# Patient Record
Sex: MALE | Race: WHITE | NOT HISPANIC OR LATINO | Employment: FULL TIME | URBAN - METROPOLITAN AREA
[De-identification: names, ages, dates, MRNs, and addresses within clinical notes are randomized per-mention and may not be internally consistent; named-entity substitution may affect disease eponyms.]

---

## 2020-01-30 DIAGNOSIS — M25.562 PAIN IN BOTH KNEES, UNSPECIFIED CHRONICITY: Primary | ICD-10-CM

## 2020-01-30 DIAGNOSIS — M25.561 PAIN IN BOTH KNEES, UNSPECIFIED CHRONICITY: Primary | ICD-10-CM

## 2020-02-03 ENCOUNTER — OFFICE VISIT (OUTPATIENT)
Dept: ORTHOPEDICS | Facility: CLINIC | Age: 65
End: 2020-02-03
Payer: COMMERCIAL

## 2020-02-03 ENCOUNTER — HOSPITAL ENCOUNTER (OUTPATIENT)
Dept: RADIOLOGY | Facility: HOSPITAL | Age: 65
Discharge: HOME | End: 2020-02-03
Attending: ORTHOPAEDIC SURGERY
Payer: COMMERCIAL

## 2020-02-03 VITALS — WEIGHT: 225.19 LBS | BODY MASS INDEX: 28.9 KG/M2 | HEIGHT: 74 IN

## 2020-02-03 DIAGNOSIS — M25.561 PAIN IN BOTH KNEES, UNSPECIFIED CHRONICITY: ICD-10-CM

## 2020-02-03 DIAGNOSIS — M17.11 PRIMARY OSTEOARTHRITIS OF RIGHT KNEE: Primary | ICD-10-CM

## 2020-02-03 DIAGNOSIS — M17.12 PRIMARY OSTEOARTHRITIS OF LEFT KNEE: ICD-10-CM

## 2020-02-03 DIAGNOSIS — M25.562 PAIN IN BOTH KNEES, UNSPECIFIED CHRONICITY: ICD-10-CM

## 2020-02-03 PROCEDURE — 99999 PR PBB SHADOW E&M-EST. PATIENT-LVL III: ICD-10-PCS | Mod: PBBFAC,,, | Performed by: ORTHOPAEDIC SURGERY

## 2020-02-03 PROCEDURE — 73562 X-RAY EXAM OF KNEE 3: CPT | Mod: TC,50

## 2020-02-03 PROCEDURE — 3008F PR BODY MASS INDEX (BMI) DOCUMENTED: ICD-10-PCS | Mod: CPTII,S$GLB,, | Performed by: ORTHOPAEDIC SURGERY

## 2020-02-03 PROCEDURE — 3008F BODY MASS INDEX DOCD: CPT | Mod: CPTII,S$GLB,, | Performed by: ORTHOPAEDIC SURGERY

## 2020-02-03 PROCEDURE — 99203 PR OFFICE/OUTPT VISIT, NEW, LEVL III, 30-44 MIN: ICD-10-PCS | Mod: S$GLB,,, | Performed by: ORTHOPAEDIC SURGERY

## 2020-02-03 PROCEDURE — 99999 PR PBB SHADOW E&M-EST. PATIENT-LVL III: CPT | Mod: PBBFAC,,, | Performed by: ORTHOPAEDIC SURGERY

## 2020-02-03 PROCEDURE — 99203 OFFICE O/P NEW LOW 30 MIN: CPT | Mod: S$GLB,,, | Performed by: ORTHOPAEDIC SURGERY

## 2020-02-03 PROCEDURE — 73562 XR KNEE ORTHO BILAT: ICD-10-PCS | Mod: 26,50,, | Performed by: RADIOLOGY

## 2020-02-03 PROCEDURE — 73562 X-RAY EXAM OF KNEE 3: CPT | Mod: 26,50,, | Performed by: RADIOLOGY

## 2020-02-03 NOTE — PROGRESS NOTES
Subjective:      Patient ID: Hilario Thomas is a 64 y.o. male.    Chief Complaint: Pain of the Left Knee and Pain of the Right Knee    HPI    Hilario Thomas is a 64 year old male here with a 4 years history of bilateral knee pain.  The left was hurting more however now the right is hurting more than the left. 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 There was not a history of trauma.  The pain is moderate The pain is located in the lateral, global aspect of the knee. There is not radiation. There is catching or locking.   The pain is described as achy. The patient has not had prior surgery. It is aggravated by walking.  It is not alleviated by rest. There is not numbness or tingling of the lower extremity.  There is not back pain.  He  has tried medications and injections. They have helped for months initially however now they do not help.  He does have difficulty getting in or out of a car, getting dressed, or going up or down stairs.  The patient does not use an assistive device.     Past Medical History:   Diagnosis Date    Abnormal ECG 5/31/2016 5/31/2016: ECG: Inferior Q waves.    Family history of ischemic heart disease 5/31/2016    Mother: Age 55: Myocardial infarction.       Current Outpatient Medications on File Prior to Visit   Medication Sig Dispense Refill    meloxicam (MOBIC) 15 MG tablet Take 15 mg by mouth once daily.       No current facility-administered medications on file prior to visit.        Past Surgical History:   Procedure Laterality Date    KNEE SURGERY         Family History   Problem Relation Age of Onset    Heart attack Mother        Social History     Socioeconomic History    Marital status:      Spouse name: Not on file    Number of children: Not on file    Years of education: Not on file    Highest education level: Not on file   Occupational History    Not on file   Social Needs    Financial resource strain: Not on file     Food insecurity:     Worry: Not on file     Inability: Not on file    Transportation needs:     Medical: Not on file     Non-medical: Not on file   Tobacco Use    Smoking status: Former Smoker     Packs/day: 1.00     Years: 10.00     Pack years: 10.00     Start date: 1977     Last attempt to quit: 1987     Years since quittin.1    Smokeless tobacco: Never Used   Substance and Sexual Activity    Alcohol use: Yes     Comment: Socially    Drug use: Not on file    Sexual activity: Not on file   Lifestyle    Physical activity:     Days per week: Not on file     Minutes per session: Not on file    Stress: Not on file   Relationships    Social connections:     Talks on phone: Not on file     Gets together: Not on file     Attends Denominational service: Not on file     Active member of club or organization: Not on file     Attends meetings of clubs or organizations: Not on file     Relationship status: Not on file   Other Topics Concern    Not on file   Social History Narrative    Not on file         Review of Systems   Constitution: Negative for chills, fever and night sweats.   HENT: Negative for hearing loss.    Eyes: Negative for blurred vision and double vision.   Cardiovascular: Negative for chest pain, claudication and leg swelling.   Respiratory: Negative for shortness of breath.    Endocrine: Negative for polydipsia, polyphagia and polyuria.   Hematologic/Lymphatic: Negative for adenopathy and bleeding problem. Does not bruise/bleed easily.   Skin: Negative for poor wound healing.   Musculoskeletal: Positive for joint pain.   Gastrointestinal: Negative for diarrhea and heartburn.   Genitourinary: Negative for bladder incontinence.   Neurological: Negative for focal weakness, headaches, light-headedness, numbness, paresthesias and sensory change.   Psychiatric/Behavioral: The patient is not nervous/anxious.    Allergic/Immunologic: Negative for persistent infections.              Objective:       Body mass index is 28.91 kg/m².        General    Constitutional: He is oriented to person, place, and time. He appears well-developed.   HENT:   Head: Atraumatic.   Cardiovascular: Normal rate.    Pulmonary/Chest: Effort normal.   Abdominal: Soft.   Neurological: He is alert and oriented to person, place, and time. He has normal reflexes.   Psychiatric: He has a normal mood and affect.     General Musculoskeletal Exam   Gait: normal       Right Knee Exam     Inspection   Erythema: absent  Scars: absent  Swelling: absent  Effusion: absent  Deformity: present (varus)  Bruising: absent    Tenderness   The patient is tender to palpation of the lateral joint line and lateral retinaculum.    Range of Motion   Extension: 5   Flexion: 110     Tests   Ligament Examination Lachman: normal (-1 to 2mm)   MCL - Valgus: abnormal  LCL - Varus: abnormal  Patella   Passive Patellar Tilt: neutral  Patellar Grind: positive    Other   Popliteal (Baker's) Cyst: present  Sensation: normal    Left Knee Exam     Inspection   Erythema: absent  Scars: absent  Swelling: absent  Effusion: absent  Deformity: present (varus)  Bruising: absent    Tenderness   The patient tender to palpation of the medial joint line.    Crepitus   The patient has crepitus of the patella.    Range of Motion   Extension: 0   Flexion: 110     Tests   Stability Lachman: normal (-1 to 2mm)   MCL - Valgus: abnormal  LCL - Varus: abnormal  Patella   Passive Patellar Tilt: neutral  Patellar Grind: positive    Other   Popliteal (Baker's) Cyst: present  Sensation: normal    Muscle Strength   Right Lower Extremity   Hip Abduction: 5/5   Quadriceps:  5/5   Hamstrin/5   Left Lower Extremity   Hip Abduction: 5/5   Quadriceps:  5/5   Hamstrin/5     Reflexes     Left Side  Quadriceps:  2+  Achilles:  2+    Right Side   Quadriceps:  2+  Achilles:  2+    Vascular Exam     Right Pulses  Dorsalis Pedis:      2+  Posterior Tibial:      2+        Left Pulses  Dorsalis  Pedis:      2+  Posterior Tibial:      2+        Edema  Right Lower Leg: absent  Left Lower Leg: absent    Radiographs taken today and reviewed by me demonstrate severe arthritic change of the bilateral KNEE(s).There  is not bone destruction.  There is not a fracture. The medial compartment is most involved.  There is a varus deformity.  The changes are tricompartmental.              Assessment:       Encounter Diagnoses   Name Primary?    Primary osteoarthritis of right knee Yes    Primary osteoarthritis of left knee           Plan:       Hilario was seen today for pain and pain.    Diagnoses and all orders for this visit:    Primary osteoarthritis of right knee    Primary osteoarthritis of left knee      Treatment options were discussed. The surgical process of robotically assisted right knee replacement was discussed in detail with the patient including a detailed discussion of the procedure itself (including visual model, x-ray review, and literature review). The typical perioperative and post-operative course was discussed and perioperative risks were discussed to the patient's satisfaction.  Risks and complications discussed included but were not limited to the risks of anesthetic complications, infection, bleeding, wound healing complications, stiffness, aseptic loosening, instability, limb length inequality, neurologic dysfunction including numbness and weakness, additional surgery,  DVT, pulmonary embolism, perioperative medical risks (cardiac, pulmonary, renal, neurologic), and death and the patient elects to proceed.  The patient should get medically cleared and attend the joint seminar.  ASA for DVT prophylaxis  Same Day protocol

## 2020-02-05 DIAGNOSIS — M17.12 PRIMARY OSTEOARTHRITIS OF LEFT KNEE: Primary | ICD-10-CM

## 2020-02-07 ENCOUNTER — OFFICE VISIT (OUTPATIENT)
Dept: PRIMARY CARE CLINIC | Facility: CLINIC | Age: 65
End: 2020-02-07
Payer: COMMERCIAL

## 2020-02-07 VITALS
BODY MASS INDEX: 29.14 KG/M2 | RESPIRATION RATE: 18 BRPM | OXYGEN SATURATION: 100 % | SYSTOLIC BLOOD PRESSURE: 138 MMHG | HEART RATE: 97 BPM | HEIGHT: 74 IN | WEIGHT: 227.06 LBS | TEMPERATURE: 98 F | DIASTOLIC BLOOD PRESSURE: 64 MMHG

## 2020-02-07 DIAGNOSIS — Z01.818 PREOPERATIVE EXAMINATION: ICD-10-CM

## 2020-02-07 DIAGNOSIS — Z12.11 COLON CANCER SCREENING: ICD-10-CM

## 2020-02-07 DIAGNOSIS — N52.9 ERECTILE DYSFUNCTION, UNSPECIFIED ERECTILE DYSFUNCTION TYPE: ICD-10-CM

## 2020-02-07 DIAGNOSIS — Z12.5 PROSTATE CANCER SCREENING: ICD-10-CM

## 2020-02-07 DIAGNOSIS — M17.0 PRIMARY OSTEOARTHRITIS OF BOTH KNEES: Primary | ICD-10-CM

## 2020-02-07 DIAGNOSIS — Z13.6 ENCOUNTER FOR SCREENING FOR CARDIOVASCULAR DISORDERS: ICD-10-CM

## 2020-02-07 DIAGNOSIS — Z11.59 NEED FOR HEPATITIS C SCREENING TEST: ICD-10-CM

## 2020-02-07 DIAGNOSIS — Z23 NEED FOR VACCINATION: ICD-10-CM

## 2020-02-07 DIAGNOSIS — Z11.4 SCREENING FOR HIV (HUMAN IMMUNODEFICIENCY VIRUS): ICD-10-CM

## 2020-02-07 PROBLEM — M17.9 OSTEOARTHRITIS OF KNEE: Status: ACTIVE | Noted: 2020-02-07

## 2020-02-07 PROCEDURE — 99999 PR PBB SHADOW E&M-EST. PATIENT-LVL IV: ICD-10-PCS | Mod: PBBFAC,,, | Performed by: FAMILY MEDICINE

## 2020-02-07 PROCEDURE — 99999 PR PBB SHADOW E&M-EST. PATIENT-LVL IV: CPT | Mod: PBBFAC,,, | Performed by: FAMILY MEDICINE

## 2020-02-07 PROCEDURE — 3008F PR BODY MASS INDEX (BMI) DOCUMENTED: ICD-10-PCS | Mod: CPTII,S$GLB,, | Performed by: FAMILY MEDICINE

## 2020-02-07 PROCEDURE — 93005 ELECTROCARDIOGRAM TRACING: CPT | Mod: S$GLB,,, | Performed by: FAMILY MEDICINE

## 2020-02-07 PROCEDURE — 99203 PR OFFICE/OUTPT VISIT, NEW, LEVL III, 30-44 MIN: ICD-10-PCS | Mod: 25,S$GLB,, | Performed by: FAMILY MEDICINE

## 2020-02-07 PROCEDURE — 93005 EKG 12-LEAD: ICD-10-PCS | Mod: S$GLB,,, | Performed by: FAMILY MEDICINE

## 2020-02-07 PROCEDURE — 93010 EKG 12-LEAD: ICD-10-PCS | Mod: S$GLB,,, | Performed by: INTERNAL MEDICINE

## 2020-02-07 PROCEDURE — 99203 OFFICE O/P NEW LOW 30 MIN: CPT | Mod: 25,S$GLB,, | Performed by: FAMILY MEDICINE

## 2020-02-07 PROCEDURE — 3008F BODY MASS INDEX DOCD: CPT | Mod: CPTII,S$GLB,, | Performed by: FAMILY MEDICINE

## 2020-02-07 PROCEDURE — 93010 ELECTROCARDIOGRAM REPORT: CPT | Mod: S$GLB,,, | Performed by: INTERNAL MEDICINE

## 2020-02-07 NOTE — PROGRESS NOTES
"Subjective:       Patient ID: Hilario Thomas is a 64 y.o. male.    Chief Complaint: Establish Care    Here to establish care.  Has not had a general checkup in several years.  Stress test negative for ischemia several years ago.  Has been feeling well other than bilateral knee pain. Scheduled for total knee arthroplasty in late March, and will need the other knee done a few months later.  No other complaints at this time other than some issues related to erectile dysfunction.  No history of STIs.  No dysuria or discharge.    Review of Systems   Constitutional: Negative for chills, fatigue and fever.   HENT: Negative for congestion.    Eyes: Negative for visual disturbance.   Respiratory: Negative for cough and shortness of breath.    Cardiovascular: Negative for chest pain.   Gastrointestinal: Negative for abdominal pain, nausea and vomiting.   Genitourinary: Negative for difficulty urinating.   Musculoskeletal: Negative for arthralgias.   Skin: Negative for rash.   Allergic/Immunologic: Negative for immunocompromised state.   Neurological: Negative for dizziness.   Hematological: Does not bruise/bleed easily.   Psychiatric/Behavioral: Negative for sleep disturbance.       Objective:      Vitals:    02/07/20 1337   BP: 138/64   BP Location: Left arm   Patient Position: Sitting   BP Method: Large (Manual)   Pulse: 97   Resp: 18   Temp: 98.2 °F (36.8 °C)   TempSrc: Oral   SpO2: 100%   Weight: 103 kg (227 lb 1.2 oz)   Height: 6' 2" (1.88 m)     Physical Exam   Constitutional: He is oriented to person, place, and time. He appears well-developed and well-nourished.   HENT:   Head: Normocephalic and atraumatic.   Mouth/Throat: Oropharynx is clear and moist.   Eyes: Pupils are equal, round, and reactive to light. EOM are normal.   Neck: Neck supple. No JVD present. Carotid bruit is not present.   Cardiovascular: Normal rate, regular rhythm and normal heart sounds.   Pulses:       Radial pulses are 2+ on the right side, " and 2+ on the left side.   Pulmonary/Chest: Effort normal and breath sounds normal.   Abdominal: Soft. Bowel sounds are normal. There is no tenderness.   Musculoskeletal: He exhibits no edema.   Neurological: He is alert and oriented to person, place, and time.   Skin: Skin is warm and dry.   Psychiatric: He has a normal mood and affect. His behavior is normal.   Nursing note and vitals reviewed.      Lab Results   Component Value Date    WBC 6.67 02/15/2008    HGB 15.3 02/15/2008    HCT 46.0 02/15/2008     02/15/2008    CHOL 167 02/15/2008    TRIG 50 02/15/2008    HDL 47 02/15/2008     02/15/2008    K 5.1 02/15/2008     02/15/2008    CREATININE 0.9 02/15/2008    BUN 16 02/15/2008    CO2 24 02/15/2008    PSA 0.4 02/15/2008      Assessment:       1. Primary osteoarthritis of both knees    2. Colon cancer screening    3. Encounter for screening for cardiovascular disorders    4. Prostate cancer screening    5. Screening for HIV (human immunodeficiency virus)    6. Need for hepatitis C screening test    7. Preoperative examination    8. Need for vaccination    9. Erectile dysfunction, unspecified erectile dysfunction type        Plan:       Primary osteoarthritis of both knees    Colon cancer screening  -     Ambulatory referral/consult to Colorectal Surgery; Future; Expected date: 02/14/2020    Encounter for screening for cardiovascular disorders  -     CBC auto differential; Future; Expected date: 02/07/2020  -     Comprehensive metabolic panel; Future; Expected date: 02/07/2020  -     Lipid panel; Future; Expected date: 02/07/2020    Prostate cancer screening  -     PSA, Screening; Future; Expected date: 02/07/2020    Screening for HIV (human immunodeficiency virus)  -     HIV 1/2 Ag/Ab (4th Gen); Future; Expected date: 02/07/2020    Need for hepatitis C screening test  -     Hepatitis C antibody; Future; Expected date: 02/07/2020    Preoperative examination  -     EKG 12-lead  -     X-Ray Chest  PA And Moises; Future; Expected date: 02/07/2020    Need for vaccination  -     varicella-zoster gE-AS01B, PF, (SHINGRIX, PF,) 50 mcg/0.5 mL injection; Inject 0.5 mLs into the muscle once. for 1 dose  Dispense: 0.5 mL; Refill: 0    Erectile dysfunction, unspecified erectile dysfunction type  -     TESTOSTERONE PANEL; Future; Expected date: 02/07/2020      Medication List with Changes/Refills   New Medications    VARICELLA-ZOSTER GE-AS01B, PF, (SHINGRIX, PF,) 50 MCG/0.5 ML INJECTION    Inject 0.5 mLs into the muscle once. for 1 dose   Discontinued Medications    MELOXICAM (MOBIC) 15 MG TABLET    Take 15 mg by mouth once daily.

## 2020-02-10 ENCOUNTER — TELEPHONE (OUTPATIENT)
Dept: CARDIOLOGY | Facility: CLINIC | Age: 65
End: 2020-02-10

## 2020-02-10 DIAGNOSIS — Z12.11 SCREENING FOR COLON CANCER: Primary | ICD-10-CM

## 2020-02-10 NOTE — TELEPHONE ENCOUNTER
Called patient in reference to a referral to Colorectal Surgery for colon cancer screening. Patient verbally consented to a Colonoscopy and requested to be scheduled for a Colonoscopy on 03/19/2020. Patient was advised a designated  is required on the day of the Colonoscopy to drive the patient home and the  must be at least. 18 years old.Colonoscopy Prep instructions were thoroughly explained and discussed with the patient. Patient acknowledges understanding Prep instructions. Patient was advised the Colonoscopy Prep instructions discussed and explained on the phone , are being mailed out to the patient's address on file. Patient's address on file was verified with the patient for accuracy of mailing. Patient's medications on file was reviewed with the patient for accuracy of information. Patient denies taking  any other medications other than those listed and verified on medication profile.Patient was explained the Colonoscopy will be performed here at [facility]. Patient was further explained the Pre-Op will call one day prior to the procedure to discuss Pre-Op instructions and what time to report for the Colonoscopy. The patient was given the opportunity to ask any questions about the Colonoscopy. No further issues were discussed.

## 2020-02-11 ENCOUNTER — TELEPHONE (OUTPATIENT)
Dept: PRIMARY CARE CLINIC | Facility: CLINIC | Age: 65
End: 2020-02-11

## 2020-02-11 NOTE — TELEPHONE ENCOUNTER
----- Message from Elizabeth Rivera sent at 2/11/2020  1:16 PM CST -----  Contact: Patient  Type: Needs Medical Advice    Who Called:  Hilario patient  Symptoms (please be specific):  N/A  How long has patient had these symptoms:  N/A  Pharmacy name and phone #:  N/A  Best Call Back Number: [phone]  Additional Information: Calling to talk to you again about the chest xray results. Please call him. Thanks.

## 2020-02-11 NOTE — TELEPHONE ENCOUNTER
Patient notified degenerative changes noted to thoracic spine, no acute abnormalities. He verbalized understanding

## 2020-03-02 DIAGNOSIS — M79.605 PAIN OF LEFT LOWER EXTREMITY: Primary | ICD-10-CM

## 2020-03-04 ENCOUNTER — TELEPHONE (OUTPATIENT)
Dept: SURGERY | Facility: CLINIC | Age: 65
End: 2020-03-04

## 2020-03-04 NOTE — TELEPHONE ENCOUNTER
Called the patient at all available numbers reference to rescheduling the Colonoscopy that was canceled at the providers request , left voice message.

## 2020-03-06 ENCOUNTER — TELEPHONE (OUTPATIENT)
Dept: PREADMISSION TESTING | Facility: HOSPITAL | Age: 65
End: 2020-03-06

## 2020-03-06 DIAGNOSIS — M17.12 PRIMARY OSTEOARTHRITIS OF LEFT KNEE: Primary | ICD-10-CM

## 2020-03-06 NOTE — TELEPHONE ENCOUNTER
----- Message from Lisset Cerda LPN sent at 3/3/2020  3:31 PM CST -----  Surgery 3/31    Patient need Lab,POC and OPOC appt.

## 2020-03-09 ENCOUNTER — TELEPHONE (OUTPATIENT)
Dept: ORTHOPEDICS | Facility: CLINIC | Age: 65
End: 2020-03-09

## 2020-03-09 ENCOUNTER — DOCUMENTATION ONLY (OUTPATIENT)
Dept: ORTHOPEDICS | Facility: CLINIC | Age: 65
End: 2020-03-09

## 2020-03-09 NOTE — TELEPHONE ENCOUNTER
Spoke to pt, informed that his insurance company has denied his geovanni CT and geovanni robotically assisted portion of the surgery. The pt may call his insurance company to determine his out of pocket cost. He may have a regular TKA per Dr. Centeno or pay the OOP cost for the GEOVANNI.  Unable to answer pt's very specific questions regarding the difference between a TKA and a GEOVANNI TKA. Informed pt that Mariel RASMUSSEN will call him today to help answer questions. Pt states he will speak with his wife as well. Understanding verbalized.

## 2020-03-09 NOTE — TELEPHONE ENCOUNTER
Spoke to patient. We discussed TKA with and without robotic assistance. Patient is satisfied.     ----- Message from Hoda Borrero RN sent at 3/9/2020 11:28 AM CDT -----  Regarding: Geovanni surgery VS no GEOVANNI  Mariel,    I spoke to this pt to notify regarding the Geovanni ct not being approved and changing the sx to no geovanni. The pt had very specific questions about the difference of between the surgeries. Can you call him and explain it help him decide. I was unable to explain enough, sorry. He will not be available for a call from 12-2:30 pm.    Thank you so much,    Hoda

## 2020-03-13 DIAGNOSIS — M17.12 PRIMARY OSTEOARTHRITIS OF LEFT KNEE: Primary | ICD-10-CM

## 2020-03-19 ENCOUNTER — TELEPHONE (OUTPATIENT)
Dept: ORTHOPEDICS | Facility: CLINIC | Age: 65
End: 2020-03-19

## 2020-03-19 NOTE — TELEPHONE ENCOUNTER
Spoke to pt, informed that his surgery is postponed as we are not doing any elective surgeries due to COVID concerns. We will call when we are able to start rescheduling. Informed pt that Dr. Centeno was able to get the FILOMENA part of his surgery approved. He will have a CT done once we reschedule the surgery. Pt pleased and verbalized understanding.

## 2020-03-23 RX ORDER — MELOXICAM 15 MG/1
15 TABLET ORAL DAILY
Qty: 30 TABLET | Refills: 1 | Status: SHIPPED | OUTPATIENT
Start: 2020-03-23 | End: 2020-04-22

## 2020-04-09 ENCOUNTER — TELEPHONE (OUTPATIENT)
Dept: ORTHOPEDICS | Facility: CLINIC | Age: 65
End: 2020-04-09

## 2020-04-13 ENCOUNTER — TELEPHONE (OUTPATIENT)
Dept: ORTHOPEDICS | Facility: CLINIC | Age: 65
End: 2020-04-13

## 2020-04-13 NOTE — TELEPHONE ENCOUNTER
Spoke to pt, informed him that Dr. Centeno asked that we reach out to check on his pts and inquire about possibly being able to schedule surgeries in May. The situation is fluid and changing often. Pt states he is doing well and would like a surgery date in May if that is possible. Informed pt once we know more, we will reach out. Pt pleased and verbalized understanding.

## 2020-04-14 ENCOUNTER — TELEPHONE (OUTPATIENT)
Dept: ORTHOPEDICS | Facility: CLINIC | Age: 65
End: 2020-04-14

## 2020-04-14 NOTE — TELEPHONE ENCOUNTER
Spoke to pt ,informed that his surgery can be rescheduled for 5/5 but it is a fluid situation and may change. Pt accepted date of 5/5, scheduled appts, informed that he will be scheduled for a COVID test at the [facility] thru location on 5/4 and will call to provide details soon. Pt pleased and verbalized understanding.

## 2020-04-21 ENCOUNTER — TELEPHONE (OUTPATIENT)
Dept: ORTHOPEDICS | Facility: CLINIC | Age: 65
End: 2020-04-21

## 2020-04-21 NOTE — ANESTHESIA PAT ROS NOTE
04/21/2020  Hilario Thomas is a 65 y.o., male.      Pre-op Assessment         Review of Systems  Anesthesia Hx:  No problems with previous Anesthesia  Denies Family Hx of Anesthesia complications.   Denies Personal Hx of Anesthesia complications.   Social:  Former Smoker, Social Alcohol Use    Cardiovascular:    Denies Angina.  Functional Capacity good / => 4 METS    Pulmonary:   Denies Shortness of breath.  Denies Recent URI.  Possible Obstructive Sleep Apnea , (STOP/BANG) Symptoms S - Snoring (loud), A - Age > 50 and G - Gender (Male > Female)        Musculoskeletal:  Musculoskeletal General/Symptoms: joint pain. Functional capacity is ambulatory without assistance.  Joint Disease:  Arthritis, Osteoarthritis    Neurological:  Pain , onset is chronic , location of knee , alleviating factors are mobic prn. Osteoarthritis    Psych:  Psychiatric Normal           Physical Exam  General:  Well nourished    Airway/Jaw/Neck:  Jaw/Neck Findings: (per pt) Neck ROM: Extension Decreased, Mild      Dental:  Dental Findings: In tact        Mental Status:  Mental Status Findings:  Cooperative, Alert and Oriented       4/30/20 POC visit completed via phone interview. Covid scheduled for 5/4. Dr Parrish clearance obtained:  Preoperative  risk assessment     Cardiac     Revised cardiac risk index ( RCRI ) predictors- 0---.Functional capacity  Is more than 4 Mets. He will be undergoing a Orthopedic procedure that carries a intermediate risk      Risk of a major Cardiac event ( Defined as death, myocardial infarction, or cardiac arrest at 30 days after noncardiac surgery), based on RCRI score      -3.9%            No further cardiac work up is indicated prior to proceeding with the surgery      American Society of Anesthesiologists Physical status classification ( ASA ) class- - 2         Anesthesia Assessment:  Preoperative EQUATION    Planned Procedure:  ARTHROPLASTY, KNEE TOTAL  Requested Anesthesia Type: REGIONAL  Surgeon: Oli Centeno MD  Service: Orthopedics  Known or anticipated Date of Surgery: 5/5/2020    Surgeon notes: reviewed    Previous anesthesia records:Not available    Last PCP note: within [facility] , Dr Truxillo 2/7/20    Other important co-morbidities: none; former smoker      Tests already available:  CBC, CMP, EKG 2/7/20; outside test in media: EXERCISE STRESS ECHO 2016                Plan:    Testing:  PT/INR   Pre-anesthesia  visit       Visit focus: possible regional anesthesia and/or nerve block      Consultation:IM Perioperative Hospitalist       Navigation: Tests to be scheduled.              Consults to be scheduled.             Results will be tracked by Preop Clinic.

## 2020-04-21 NOTE — TELEPHONE ENCOUNTER
Spoke to pt, rescheduled pre-op appt and CT to 4/29. Mailed appt slips. Informed pt that the Iovera will not be approved by his insurance. Pt states that is fine. Pt pleased and verbalized understanding.

## 2020-04-23 ENCOUNTER — TELEPHONE (OUTPATIENT)
Dept: ORTHOPEDICS | Facility: CLINIC | Age: 65
End: 2020-04-23

## 2020-04-23 DIAGNOSIS — Z01.818 PRE-OP TESTING: Primary | ICD-10-CM

## 2020-04-23 NOTE — TELEPHONE ENCOUNTER
Lm for pt to call back and schedule his COVID-19 test to be done first thing in the morning on 5/4.

## 2020-04-29 ENCOUNTER — PATIENT OUTREACH (OUTPATIENT)
Dept: ADMINISTRATIVE | Facility: OTHER | Age: 65
End: 2020-04-29

## 2020-04-29 ENCOUNTER — INITIAL CONSULT (OUTPATIENT)
Dept: INTERNAL MEDICINE | Facility: CLINIC | Age: 65
End: 2020-04-29
Payer: COMMERCIAL

## 2020-04-29 ENCOUNTER — OFFICE VISIT (OUTPATIENT)
Dept: INTERNAL MEDICINE | Facility: CLINIC | Age: 65
End: 2020-04-29
Payer: COMMERCIAL

## 2020-04-29 ENCOUNTER — OFFICE VISIT (OUTPATIENT)
Dept: ORTHOPEDICS | Facility: CLINIC | Age: 65
End: 2020-04-29
Payer: COMMERCIAL

## 2020-04-29 ENCOUNTER — HOSPITAL ENCOUNTER (OUTPATIENT)
Dept: RADIOLOGY | Facility: HOSPITAL | Age: 65
Discharge: HOME | End: 2020-04-29
Attending: PHYSICIAN ASSISTANT
Payer: COMMERCIAL

## 2020-04-29 ENCOUNTER — LAB VISIT (OUTPATIENT)
Dept: LAB | Facility: HOSPITAL | Age: 65
End: 2020-04-29
Attending: EMERGENCY MEDICINE
Payer: COMMERCIAL

## 2020-04-29 VITALS
DIASTOLIC BLOOD PRESSURE: 73 MMHG | HEIGHT: 74 IN | BODY MASS INDEX: 29 KG/M2 | TEMPERATURE: 98 F | HEART RATE: 50 BPM | WEIGHT: 226 LBS | SYSTOLIC BLOOD PRESSURE: 157 MMHG

## 2020-04-29 DIAGNOSIS — R60.9 EDEMA, UNSPECIFIED TYPE: ICD-10-CM

## 2020-04-29 DIAGNOSIS — R94.31 ABNORMAL ECG: ICD-10-CM

## 2020-04-29 DIAGNOSIS — M17.0 PRIMARY OSTEOARTHRITIS OF BOTH KNEES: ICD-10-CM

## 2020-04-29 DIAGNOSIS — Z82.49 FAMILY HISTORY OF ISCHEMIC HEART DISEASE: ICD-10-CM

## 2020-04-29 DIAGNOSIS — Z96.652 STATUS POST TOTAL LEFT KNEE REPLACEMENT: ICD-10-CM

## 2020-04-29 DIAGNOSIS — M17.12 PRIMARY OSTEOARTHRITIS OF LEFT KNEE: ICD-10-CM

## 2020-04-29 DIAGNOSIS — I49.9 CARDIAC ARRHYTHMIA, UNSPECIFIED CARDIAC ARRHYTHMIA TYPE: ICD-10-CM

## 2020-04-29 DIAGNOSIS — N52.9 ERECTILE DYSFUNCTION, UNSPECIFIED ERECTILE DYSFUNCTION TYPE: ICD-10-CM

## 2020-04-29 DIAGNOSIS — Z01.818 PREOP EXAMINATION: Primary | ICD-10-CM

## 2020-04-29 DIAGNOSIS — M79.605 PAIN OF LEFT LOWER EXTREMITY: ICD-10-CM

## 2020-04-29 DIAGNOSIS — M17.12 PRIMARY OSTEOARTHRITIS OF LEFT KNEE: Primary | ICD-10-CM

## 2020-04-29 DIAGNOSIS — Z83.3 FAMILY HISTORY OF DIABETES MELLITUS: ICD-10-CM

## 2020-04-29 LAB
INR PPP: 0.9 (ref 0.8–1.2)
PROTHROMBIN TIME: 9.9 SEC (ref 9–12.5)

## 2020-04-29 PROCEDURE — 99499 NO LOS: ICD-10-PCS | Mod: S$GLB,,, | Performed by: HOSPITALIST

## 2020-04-29 PROCEDURE — 73700 CT LOWER EXTREMITY W/O DYE: CPT | Mod: TC,LT

## 2020-04-29 PROCEDURE — 73560 X-RAY EXAM OF KNEE 1 OR 2: CPT | Mod: TC,LT

## 2020-04-29 PROCEDURE — 85610 PROTHROMBIN TIME: CPT

## 2020-04-29 PROCEDURE — 99214 OFFICE O/P EST MOD 30 MIN: CPT | Mod: 95,,, | Performed by: HOSPITALIST

## 2020-04-29 PROCEDURE — 99499 UNLISTED E&M SERVICE: CPT | Mod: S$GLB,,, | Performed by: PHYSICIAN ASSISTANT

## 2020-04-29 PROCEDURE — 99499 NO LOS: ICD-10-PCS | Mod: S$GLB,,, | Performed by: PHYSICIAN ASSISTANT

## 2020-04-29 PROCEDURE — 73560 XR KNEE 1 OR 2 VIEW LEFT: ICD-10-PCS | Mod: 26,LT,, | Performed by: INTERNAL MEDICINE

## 2020-04-29 PROCEDURE — 99499 UNLISTED E&M SERVICE: CPT | Mod: S$GLB,,, | Performed by: HOSPITALIST

## 2020-04-29 PROCEDURE — 73560 X-RAY EXAM OF KNEE 1 OR 2: CPT | Mod: 26,LT,, | Performed by: INTERNAL MEDICINE

## 2020-04-29 PROCEDURE — 73700 CT KNEE WITHOUT CONTRAST LEFT: ICD-10-PCS | Mod: 26,LT,, | Performed by: RADIOLOGY

## 2020-04-29 PROCEDURE — 99999 PR PBB SHADOW E&M-EST. PATIENT-LVL IV: CPT | Mod: PBBFAC,,, | Performed by: PHYSICIAN ASSISTANT

## 2020-04-29 PROCEDURE — 99999 PR PBB SHADOW E&M-EST. PATIENT-LVL IV: ICD-10-PCS | Mod: PBBFAC,,, | Performed by: PHYSICIAN ASSISTANT

## 2020-04-29 PROCEDURE — 99214 PR OFFICE/OUTPT VISIT, EST, LEVL IV, 30-39 MIN: ICD-10-PCS | Mod: 95,,, | Performed by: HOSPITALIST

## 2020-04-29 PROCEDURE — 36415 COLL VENOUS BLD VENIPUNCTURE: CPT

## 2020-04-29 PROCEDURE — 73700 CT LOWER EXTREMITY W/O DYE: CPT | Mod: 26,LT,, | Performed by: RADIOLOGY

## 2020-04-29 RX ORDER — ASPIRIN 81 MG/1
81 TABLET ORAL 2 TIMES DAILY
Qty: 60 TABLET | Refills: 0 | Status: SHIPPED | OUTPATIENT
Start: 2020-04-29 | End: 2020-07-21 | Stop reason: ALTCHOICE

## 2020-04-29 RX ORDER — LIDOCAINE HYDROCHLORIDE 10 MG/ML
1 INJECTION, SOLUTION EPIDURAL; INFILTRATION; INTRACAUDAL; PERINEURAL
Status: CANCELLED | OUTPATIENT
Start: 2020-04-29

## 2020-04-29 RX ORDER — ACETAMINOPHEN 500 MG
1000 TABLET ORAL
Status: CANCELLED | OUTPATIENT
Start: 2020-04-29

## 2020-04-29 RX ORDER — CELECOXIB 100 MG/1
400 CAPSULE ORAL
Status: CANCELLED | OUTPATIENT
Start: 2020-04-29

## 2020-04-29 RX ORDER — SODIUM CHLORIDE 0.9 % (FLUSH) 0.9 %
10 SYRINGE (ML) INJECTION
Status: CANCELLED | OUTPATIENT
Start: 2020-04-29

## 2020-04-29 RX ORDER — ONDANSETRON 2 MG/ML
4 INJECTION INTRAMUSCULAR; INTRAVENOUS EVERY 8 HOURS PRN
Status: CANCELLED | OUTPATIENT
Start: 2020-04-29

## 2020-04-29 RX ORDER — FAMOTIDINE 20 MG/1
20 TABLET, FILM COATED ORAL 2 TIMES DAILY
Status: CANCELLED | OUTPATIENT
Start: 2020-04-29

## 2020-04-29 RX ORDER — OXYCODONE HYDROCHLORIDE 5 MG/1
5 TABLET ORAL
Status: CANCELLED | OUTPATIENT
Start: 2020-04-29

## 2020-04-29 RX ORDER — ACETAMINOPHEN 500 MG
1000 TABLET ORAL EVERY 6 HOURS
Status: CANCELLED | OUTPATIENT
Start: 2020-04-29 | End: 2020-05-01

## 2020-04-29 RX ORDER — DOCUSATE SODIUM 100 MG/1
100 CAPSULE, LIQUID FILLED ORAL 2 TIMES DAILY PRN
Qty: 60 CAPSULE | Refills: 0 | Status: SHIPPED | OUTPATIENT
Start: 2020-04-29 | End: 2020-05-18

## 2020-04-29 RX ORDER — OXYCODONE HYDROCHLORIDE 5 MG/1
TABLET ORAL
Qty: 50 TABLET | Refills: 0 | Status: SHIPPED | OUTPATIENT
Start: 2020-04-29 | End: 2020-05-18

## 2020-04-29 RX ORDER — POLYETHYLENE GLYCOL 3350 17 G/17G
17 POWDER, FOR SOLUTION ORAL DAILY
Status: CANCELLED | OUTPATIENT
Start: 2020-04-29

## 2020-04-29 RX ORDER — ACETAMINOPHEN 500 MG
500 TABLET ORAL DAILY PRN
Status: ON HOLD | COMMUNITY
End: 2020-05-05 | Stop reason: HOSPADM

## 2020-04-29 RX ORDER — BISACODYL 10 MG
10 SUPPOSITORY, RECTAL RECTAL EVERY 12 HOURS PRN
Status: CANCELLED | OUTPATIENT
Start: 2020-04-29

## 2020-04-29 RX ORDER — PREGABALIN 25 MG/1
75 CAPSULE ORAL NIGHTLY
Status: CANCELLED | OUTPATIENT
Start: 2020-04-29

## 2020-04-29 RX ORDER — OXYCODONE HYDROCHLORIDE 5 MG/1
10 TABLET ORAL
Status: CANCELLED | OUTPATIENT
Start: 2020-04-29

## 2020-04-29 RX ORDER — DEXTROMETHORPHAN HYDROBROMIDE, GUAIFENESIN 5; 100 MG/5ML; MG/5ML
650 LIQUID ORAL EVERY 8 HOURS PRN
Qty: 120 TABLET | Refills: 0 | Status: SHIPPED | OUTPATIENT
Start: 2020-04-29 | End: 2020-07-31 | Stop reason: SDUPTHER

## 2020-04-29 RX ORDER — AMOXICILLIN 250 MG
1 CAPSULE ORAL 2 TIMES DAILY
Status: CANCELLED | OUTPATIENT
Start: 2020-04-29

## 2020-04-29 RX ORDER — PREGABALIN 25 MG/1
150 CAPSULE ORAL
Status: CANCELLED | OUTPATIENT
Start: 2020-04-29

## 2020-04-29 RX ORDER — IBUPROFEN 200 MG
200 TABLET ORAL NIGHTLY PRN
Status: ON HOLD | COMMUNITY
End: 2020-05-05 | Stop reason: HOSPADM

## 2020-04-29 RX ORDER — NALOXONE HCL 0.4 MG/ML
0.02 VIAL (ML) INJECTION
Status: CANCELLED | OUTPATIENT
Start: 2020-04-29

## 2020-04-29 RX ORDER — SODIUM CHLORIDE 9 MG/ML
INJECTION, SOLUTION INTRAVENOUS
Status: CANCELLED | OUTPATIENT
Start: 2020-04-29

## 2020-04-29 RX ORDER — SODIUM CHLORIDE 9 MG/ML
INJECTION, SOLUTION INTRAVENOUS CONTINUOUS
Status: CANCELLED | OUTPATIENT
Start: 2020-04-29 | End: 2020-04-30

## 2020-04-29 RX ORDER — FENTANYL CITRATE 50 UG/ML
25 INJECTION, SOLUTION INTRAMUSCULAR; INTRAVENOUS EVERY 5 MIN PRN
Status: CANCELLED | OUTPATIENT
Start: 2020-04-29

## 2020-04-29 RX ORDER — MIDAZOLAM HYDROCHLORIDE 1 MG/ML
1 INJECTION INTRAMUSCULAR; INTRAVENOUS EVERY 5 MIN PRN
Status: CANCELLED | OUTPATIENT
Start: 2020-04-29

## 2020-04-29 RX ORDER — ASPIRIN 81 MG/1
81 TABLET ORAL 2 TIMES DAILY
Status: CANCELLED | OUTPATIENT
Start: 2020-04-29

## 2020-04-29 RX ORDER — MELOXICAM 15 MG/1
15 TABLET ORAL DAILY PRN
Status: ON HOLD | COMMUNITY
End: 2020-05-05 | Stop reason: HOSPADM

## 2020-04-29 RX ORDER — CELECOXIB 200 MG/1
200 CAPSULE ORAL DAILY
Qty: 30 CAPSULE | Refills: 0 | Status: SHIPPED | OUTPATIENT
Start: 2020-04-29 | End: 2020-05-18 | Stop reason: SDUPTHER

## 2020-04-29 RX ORDER — MUPIROCIN 20 MG/G
1 OINTMENT TOPICAL
Status: CANCELLED | OUTPATIENT
Start: 2020-04-29

## 2020-04-29 NOTE — H&P (VIEW-ONLY)
CC: Left knee pain    Hilario Thomas is a 65 y.o. male with four year history of Left knee pain. Pain is worse with activity and weight bearing.  Patient has experienced interference of activities of daily living due to decreased range of motion and an increase in joint pain and swelling.  Patient has failed non-operative treatment including NSAIDs, corticosteroid injections, viscosupplement injections, and activity modification.  Hilario Thomas currently ambulates independently.     Relevant medical conditions of significance in perioperative period:  Abnormal EKG in 2016: newest EKG normal     Past Medical History:   Diagnosis Date    Abnormal ECG 5/31/2016 5/31/2016: ECG: Inferior Q waves.    Arthritis     Family history of ischemic heart disease 5/31/2016    Mother: Age 55: Myocardial infarction.       Past Surgical History:   Procedure Laterality Date    KNEE SURGERY         Family History   Problem Relation Age of Onset    Heart attack Mother     Diabetes Father     COPD Father     Diabetes Sister        Review of patient's allergies indicates:  No Known Allergies      Current Outpatient Medications:     acetaminophen (TYLENOL) 500 MG tablet, Take 500 mg by mouth daily as needed for Pain., Disp: , Rfl:     ascorbic acid (VITAMIN C ORAL), Take by mouth., Disp: , Rfl:     ergocalciferol, vitamin D2, (VITAMIN D ORAL), Take by mouth., Disp: , Rfl:     ibuprofen (ADVIL,MOTRIN) 200 MG tablet, Take 200 mg by mouth nightly as needed for Pain., Disp: , Rfl:     meloxicam (MOBIC) 15 MG tablet, Take 15 mg by mouth daily as needed for Pain., Disp: , Rfl:     acetaminophen (TYLENOL) 650 MG TbSR, Take 1 tablet (650 mg total) by mouth every 8 (eight) hours as needed., Disp: 120 tablet, Rfl: 0    aspirin (ECOTRIN) 81 MG EC tablet, Take 1 tablet (81 mg total) by mouth 2 (two) times daily., Disp: 60 tablet, Rfl: 0    celecoxib (CELEBREX) 200 MG capsule, Take 1 capsule (200 mg total) by mouth once  "daily., Disp: 30 capsule, Rfl: 0    docusate sodium (COLACE) 100 MG capsule, Take 1 capsule (100 mg total) by mouth 2 (two) times daily as needed for Constipation., Disp: 60 capsule, Rfl: 0    oxyCODONE (ROXICODONE) 5 MG immediate release tablet, Take 1-2 tabs by mouth every 4-6 hours as needed for pain, Disp: 50 tablet, Rfl: 0    Review of Systems:  Constitutional: no fever or chills  Eyes: no visual changes  ENT: no nasal congestion or sore throat  Respiratory: no cough or shortness of breath  Cardiovascular: no chest pain or palpitations  Gastrointestinal: no nausea or vomiting, tolerating diet  Genitourinary: no hematuria or dysuria  Integument/Breast: no rash or pruritis  Hematologic/Lymphatic: no easy bruising or lymphadenopathy  Musculoskeletal: positive for knee pain  Neurological: no seizures or tremors  Behavioral/Psych: no auditory or visual hallucinations  Endocrine: no heat or cold intolerance    PE:  BP (!) 157/73 (BP Location: Right arm, Patient Position: Sitting, BP Method: Large (Automatic))   Pulse (!) 50   Temp 97.5 °F (36.4 °C) (Oral)   Ht 6' 2" (1.88 m)   Wt 102.5 kg (226 lb)   BMI 29.02 kg/m²   General: Pleasant, cooperative, NAD   Gait: antalgic  HEENT: NCAT, sclera nonicteric   Lungs: Respirations clear bilaterally; equal and unlabored.   CV: S1S2; 2+ bilateral upper and lower extremity pulses.   Skin: Intact throughout with no rashes, erythema, or lesions  Extremities: No LE edema,  no erythema or warmth of the skin in either lower extremity.    Left knee exam:  Knee Range of Motion:0-110 active, pain with passive range of motion  Effusion:none  Condition of skin:intact  Location of tenderness:Medial joint line   Strength:5 of 5 quadriceps strength and 5 of 5 hamstring strength  Stability: stable to testing    Hip Examination:full painless range of motion, without tenderness    Radiographs: Radiographs reveal advanced degenerative changes including subchondral cyst formation, " subchondral sclerosis, osteophyte formation, joint space narrowing.     Knee Alignment:  Moderate varus    Diagnosis: osteoarthritis Left knee    Plan: Left total knee arthroplasty    Due to the serious nature of total joint infection and the high prevalence of community acquired MRSA, vancomycin will be used perioperatively.

## 2020-04-29 NOTE — LETTER
May 4, 2020      Oli Centeno MD  [address]  [place] [address]           Rolando elliot - Pre Op Consult  [address] [facility] [address]  Phone: [phone]          Patient: Hilario Thomas   MR Number: [id number]   YOB: 1955   Date of Visit: 4/29/2020       Dear Dr. Oli Centeno:    Thank you for referring Hilario Thomas to me for evaluation. Attached you will find relevant portions of my assessment and plan of care.    If you have questions, please do not hesitate to call me. I look forward to following Hilario Thomas along with you.    Sincerely,    Tatyana Parrish MD    Enclosure  CC:  No Recipients    If you would like to receive this communication electronically, please contact [email or web address] or [phone] to request more information on [facility] access.    For providers and/or their staff who would like to refer a patient to [facility], please contact us through our one-stop-shop provider referral line, [facility], at [phone].    If you feel you have received this communication in error or would no longer like to receive these types of communications, please e-mail [email or web address]

## 2020-04-29 NOTE — ASSESSMENT & PLAN NOTE
EKG  from Feb2020   Normal sinus rhythm  Within normal limits    Had an abnormal EKG in the past - Routine EKG  Had work up done   Not known to have heart problem    He is active person -no exertional symptoms    CV risks    Mother had heart problem-stenting - age of onset- late 50's  No DM  No HTN  No HLD  Ex Tobacco user - quit 1986

## 2020-04-29 NOTE — PROGRESS NOTES
"Virtual pre op evaluation   Video, Audio   Location of the patient  Consent obtained for virtual evaluation -    Each patient to whom he or she provides medical services by telemedicine is:  (1) informed of the relationship between the physician and patient and the respective role of any other health care provider with respect to management of the patient; and (2) notified that he or she may decline to receive medical services by telemedicine and may withdraw from such care at any time.    Chief complaint-Preoperative evaluation , Perioperative Medical management, complication reduction plan     Date of Evaluation- 04/29/2020    PCP-  Maximus Shearer MD    Future cases for Hilario Thomas [[id number]]     Case ID Status Date Time Terry Procedure Provider Location    [id number] [facility] 5/5/2020  9:30  ARTHROPLASTY,KNEE,TOTAL,USING COMPUTER-ASSISTED NAVIGATION SAME DAY Oli Centeno MD [[id number]] [facility] OR      Left     History of present illness- I had the pleasure of evaluating  this pleasant 65 y.o. gentleman i prior to his elective Orthopedic surgery. The patient is new to me .   Goes by "Jose Juan "  I have obtained the history by speaking to the patient and by reviewing the electronic health records.    Events leading up to surgery / History of presenting illness -    He has been troubled with moderate Left knee pain for the past 1 year. Pain increases with activity , night and decreases with resting.  Had a torn meniscus on the Left knee about 12 years ago ( 2007/2008 ) . Had arthroscopic knee surgery around 2008  No Left knee infection in the past    Relevant health conditions of significance for the perioperative period/ History of presenting illness -    Subjectively describes health as good     Health conditions of significance for the perioperative period      He has pain in the contralateral knee also and may require surgery in future    Was hiking in summer   Was walking 3 miles a day   Was very active ( 2-3 " jobs)    Lives with wife   Single level house- has 2 steps   Wife works for [employer] -is pharmacist  Help available post op - wife taking time off  Retired Jan 2019     Active cardiac conditions- none    Revised cardiac risk index predictors- None     Functional capacity -Examples of physical activity , has 7 acres land , cuts land, javed, prunes 100 citrus trees,   can take 1 flight of stairs----- He can undertake all the above activities without  chest pain,chest tightness, Shortness of breath ,dizziness,lightheadedness making his exercise tolerance more, less  than 4 Mets.   Does bench presses, curls   Winded on exertion climbing up a hill ( [place], [employer] ) ( , not on regular basis )- stable over time    Review of symptoms    ROS    Constitutional - No significant weight changes ,No fever, no chills  Eyes- No new vision changes  ENT- STOP BANG - age over 50, neck circumference over 40 cm and male sex    Occasional snoring while sleeping on the back - suggested follow up   Cardiac-As above   Respiratory- No cough, expectoration and  no hemoptysis  GI- Bowel movements daily in the Morning  and  regular  No overt GI/ blood losses   -No dysuria and  no urinary hesitancy   MS-As above  Neurologic-No unilateral weakness   Psychiatric- No depression,Anxiety  Endocrine-  Prednisone use for over 3 weeks -no  Hematological/Lymphatic-No spontaneous bruising, bleeding   Uses sun protection     Past Medical history- reviewed in Epic-    Pertinent negatives-   DVT  Pulmonary embolism  Vascular stenting     Past Surgical history - reviewed in Epic-    No Anaesthetic,Bleeding ,Cardiac problems with previous surgeries  No history of delirium   No history of post operative  nausea, vomiting     Family history- reviewed in EPIC    Heart disease- - Mother   FH- No anesthesia,bleeding / venous thrombosis ,problems in family     Social history- reviewed in EPIC    Lives with wife   Help available post op     Medications  and Allergies - reviewed in EPIC    Exam    Physical Exam   General appearance-Conscious,Coherent  Eyes- No conjunctival icterus  ENT-Oral cavity-no cyanosis and  moist  , Hearing grossly normal   Neck- No thyromegaly   No jugular venous distension  Respiratory - No accessory muscle use, ni audible  wheeze ,  and Normal Respiratory Effort   Peripheral pitting pedal edema-- mild, no calf pain   Musculoskeletal- No finger Clubbing. Strength grossly normal   Psychiatric - normal effect,Orientation    Heart extra beats   Lungs clear   No lymphadenopathy   Pupils reactive to light   Abdomen - soft , non tender , liver , spleen not palpable       Investigations    Review of Medicine tests        Review of clinical lab tests:  Lab Results   Component Value Date    CREATININE 0.9 02/10/2020    HGB 15.3 02/10/2020     02/10/2020         Review of old records- Was done and information gathered regards to events leading to surgery and health conditions of significance in the perioperative period         Assessment and plan    New problems to me      Preoperative  risk assessment    Cardiac    Revised cardiac risk index ( RCRI ) predictors- 0---.Functional capacity  Is more than 4 Mets. He will be undergoing a Orthopedic procedure that carries a intermediate risk     Risk of a major Cardiac event ( Defined as death, myocardial infarction, or cardiac arrest at 30 days after noncardiac surgery), based on RCRI score     -3.9%         No further cardiac work up is indicated prior to proceeding with the surgery     American Society of Anesthesiologists Physical status classification ( ASA ) class- - 2         Perioperative Medical management / Optimization    Abnormal EKG    EKG  from Feb2020   Normal sinus rhythm  Within normal limits    Had an abnormal EKG in the past - Routine EKG  Had work up done   Not known to have heart problem    He is active person -no exertional symptoms    CV risks    Mother had heart  problem-stenting - age of onset- late 50's  No DM  No HTN  No HLD  Ex Tobacco user - quit 1986    Erectile Dysfunction     Not a known diabetic  Not known to have vascular disease     Osteoarthritis of both knees     Planning on knee replacement     Edema    Ate salted food during the weekend   NSAID use   Edema- I suggested avoidance of added salt,avoidance of NSAID's, unless advised or ordered  and suggested Limb elevation and paolo hose use    Family history of diabetes Mellitus    No personal history of diabetes    Family history of heart disease     No suggestions of symptomatic CAD with good functional capacity        Preventive perioperative care    Thromboembolic prophylaxis:  His risk factors for thrombosis include surgical procedure and age.I suggest  thromboembolic prophylaxis.I suggested being active in the post operative period.   The patient is a candidate for extended DVT prophylaxis    Postoperative pulmonary complication prophylaxis- I suggest incentive Spirometry use ,  early ambulation ,  end tidal carbon dioxide  Monitoring ,  oral care  and  head end of bed elevation      Renal complication prophylaxis- I suggest keeping him well hydrated  in the perioperative period.    Surgical site Infection Prophylaxis-I  suggest appropriate antibiotic for Prophylaxis against Surgical site infections      In view of regional anesthesia  the patient  is at risk of postoperative urinary retention.  I suggest avoidance / minimizing the of  Benzodiazepines,Anticholinergic medication,antihistamines ( Benadryl) , if possible in the perioperative period. I suggest using the minimum possible use of opioids for the minimum period of time in the perioperative period. Benadryl avoidance suggested     This visit was focussed on Preoperative evaluation , Perioperative Medical management, complication reduction plans and I suggest that the patient follows up with the primary care ,relevant sub specialists for on going  health care      I appreciate the opportunity to be involved in this  pleasant  gentleman's care.Please feel free to contact me if there were any questions about this consultation     Patient is Medically  optimized   for the above planned surgery/ procedure-Pending examination     Patient/ care giver/ Family member was instructed to call and update me about any changes to health,  medication, office visits ,testing out side of the chacha operative care center , hospitalizations between now and surgery      Dr OTIS Parrish MD MRCP ( ),[facility]   [facility]  [facility]   Cell [phone]     Came for exam     Labs  INR- N    COVID screening     No fever -  No cough -  No SOB-  No sore throat-   No loss of taste or smell -  No new muscle aches -  No nausea, vomiting , diarrhea-  -  4/29- 18 01    As per Medical assistant    Tried to schedule his EKG today, but they were done for the day.   Called and spoke to him- He prefers , if possible ,not to have EKG at this time     EKG from Feb 2020 - Normal sinus rhythm  Within normal limits    Given that the arrhythmia is likely ectopy given caffeine use , given asymptomatic status ( No chest pain, SOB, dizziness , light headedness ), will defer EKG at this time

## 2020-04-29 NOTE — ASSESSMENT & PLAN NOTE
Ate salted food during the weekend   NSAID use   Edema- I suggested avoidance of added salt,avoidance of NSAID's, unless advised or ordered  and suggested Limb elevation and paolo hose use

## 2020-04-29 NOTE — PROGRESS NOTES
Hilario Thomas is a 65 y.o. year old here today for a pre-operative visit in preparation for a Left total knee arthroplasty to be performed by  Dr. Centeno on 5/5/2020.  he was last seen and treated in the clinic on 2/3/2020. he will be medically optimized by the pre op center. There has been no significant change in medical status since last visit. No fever, chills, malaise, or unexplained weight change.      Allergies, Medications, past medical and surgical history reviewed.    Focused examination performed.    Patient declined to see Dr. Centeno today in clinic.  All questions answered. Patient encouraged to call with questions. Contact information given.     Pre, chacha, and post operative procedures and expectations discussed. Questions were answered. Hilario Thomas has been educated and is ready to proceed with surgery. Approximately 30 minutes was spent discussing surgical outcomes, plans, procedures pre, chacha, and post operative expections and care.  Surgical consent signed.    Hilario Thomas will contact us if there are any questions, concerns, or changes in medical status prior to surgery.        COVID test 5/4

## 2020-04-30 NOTE — PRE-PROCEDURE INSTRUCTIONS
Preop instructions given, pt verbalized understanding.  Your surgery has been scheduled for:_______________5/5/20___________________________    You should report to:  ____[facility], located on the [place] side of the first floor of the           [facility] ([phone])  ____[facility], located on the [facility] side of the            Second floor of the [facility] ([phone])  ___x_[facility] ([facility]) Located at [address] [facility] [address]  Please Note   - Tell your doctor if you take Aspirin, products containing Aspirin, herbal medications  or blood thinners, such as Coumadin, Ticlid, or Plavix.  (Consult your provider regarding holding or stopping before surgery).  - Arrange for someone to drive you home following surgery.  You will not be allowed to leave the surgical facility alone or drive yourself home following sedation and anesthesia.  Before Surgery  - Stop taking all herbal medications 14days prior to surgery  - No Motrin/Advil (Ibuprofen) 7 days before surgery  - No Aleve (Naproxen) 7 days before surgery  - Stop Taking Aspirin, products containing Aspirin _____days before surgery  - Stop taking blood thinners_______days before surgery  - No Goody's/BC  Powder 7 days before surgery  - Refrain from drinking alcoholic beverages for 24hours before and after surgery  - Stop or limit smoking _________days before surgery  - You may take Tylenol for pain  Night before Surgery-NOTHING TO EAT/DRINK AFTER MIDNIGHT FOR EARLY CASE   Stop ALL solid food, gum, candy (including vitamins) 8 hours before arrival time.  (Please note: If your surgeon gives you different eating and drinking instructions, please follow surgeon's directions.)   Stop all CLOUDY liquids: coffee with creamer, formula, tube feeds, cloudy juices, non-human milk and breast milk with additives, 6 hours prior to arrival time.   The patient should be  ENCOURAGED to drink carbohydrate-rich clear liquids (sports drinks, clear juices) until 2 hours prior to arrival time.   CLEAR liquids include only water, black coffee NO creamer, clear oral rehydration drinks, clear sports drinks or clear fruit juices (no orange juice, no pulpy juices, no apple cider). Advise patients if they can read newsprint through the liquid, it qualifies as clear liquid.    IF IN DOUBT, drink water instead.   - Take a shower or bath (shower is recommended).  Bathe with Hibiclens soap or an antibacterial soap from the neck down.  If not supplied by your surgeon, hibiclens soap will need to be purchased over the counter in pharmacy.  Rinse soap off thoroughly.  - Shampoo your hair with your regular shampoo  The Day of Surgery  · NOTHING TO  DRINK 2 hours before arrival time. If you are told to take medication on the morning of surgery, it may be taken with a sip of water.   - Take another bath or shower with hibiclens or any antibacterial soap, to reduce the chance of infection.  - Take heart and blood pressure medications with a small sip of water, as advised by the perioperative team.  - Do not take fluid pills  - You may brush your teeth and rinse your mouth, but do not swallow any additional water.   - Do not apply perfumes, powder, body lotions or deodorant on the day of surgery.  - Nail polish should be removed.  - Do not wear makeup or moisturizer  - Wear comfortable clothes, such as a button front shirt and loose fitting pants.  - Leave all jewelry, including body piercings, and valuables at home.    - Bring any devices you will neeed after surgery such as crutches or canes.  - If you have sleep apnea, please bring your CPAP machine  In the event that your physical condition changes including the onset of a cold or respiratory illness, or if you have to delay or cancel your surgery, please notify your surgeon.  Anesthesia: Regional Anesthesia    Youre scheduled for surgery. During  surgery, youll receive medicine called anesthesia to keep you comfortable and pain-free. Your surgeon has decided that youll receive regional anesthesia. This sheet tells you what to expect with this type of anesthesia.  What is regional anesthesia?  Regional anesthesia numbs one region of your body. The anesthesia may be given around nerves or into veins in your arms, neck, or legs (nerve block or Lynne block). Or it may be sent into the spinal fluid (spinal anesthesia) or into the space just outside the spinal fluid (epidural anesthesia). You may also be given sedatives to help you relax.  Nerve block or Lynne block  A small area of the body, such as an arm or leg, can be numbed using a nerve block or Lynne block.  · Nerve block. During a nerve block, your skin is numbed. A needle is then inserted near nerves that serve the area to be numbed. Anesthetic is sent through the needle.  · IV regional or Lynne block. For this type of block, an IV line is put into a vein. The blood flow to the area to be numbed is blocked for a short time. Anesthetic is sent through the IV.  Spinal anesthesia  Spinal anesthesia numbs your body from about the waist down.  · Anesthetic is injected into the spinal fluid. This is a substance that surrounds the spinal cord in your spinal column. The anesthetic blocks pain traveling from the body to the brain.  · To receive the anesthetic, your skin is numbed at the injection site on your back.  · A needle is then inserted into the spinal space. Anesthetic is sent into the spinal fluid through the needle.  Epidural anesthesia  Epidural anesthesia is most commonly used during childbirth and may also be used after surgical procedures of the chest, belly, and legs.  · Anesthetic is injected into the epidural space. This is just outside the dural sac which contains the spinal fluid.  · To receive the anesthetic, your skin is numbed at the injection site on your back.  · A needle is then  inserted into the epidural space. Anesthetic is sent into the epidural space through the needle.  · A small flexible catheter may be attached to the needle and left in place. This allows for continuous injections or infusions of anesthetic.  Anesthesia tools and medicines that might be near you during your procedure  · Local anesthetic. This medicine is given through a needle numbs one region of your body.  · Electrocardiography leads (electrodes). These are used to record your heart rate and rhythm.  · Blood pressure cuff. A cuff is placed on your arm to keep track of your blood pressure.  · Pulse oximeter. This small clip is placed on the end of the finger. It measures your blood oxygen level.  · Sedatives. These medicines may be given through an IV. They help to relax you and keep you comfortable. You may stay awake or sleep lightly.  · Oxygen. You may be given oxygen through a facemask.  Risks and possible complications  Regional anesthesia carries some risks. These include:  · Nausea and vomiting  · Headache  · Backache  · Decreased blood pressure  · Allergic reaction to the anesthetic  · Ongoing numbness (rare)  · Irregular heartbeat (rare)  · Cardiac arrest (rare)   Date Last Reviewed: 12/1/2016  © 4811-0853 [employer]. [address] [place] [address], [place] [address]. All rights reserved. This information is not intended as a substitute for professional medical care. Always follow your healthcare professional's instructions.

## 2020-05-04 ENCOUNTER — TELEPHONE (OUTPATIENT)
Dept: INTERNAL MEDICINE | Facility: CLINIC | Age: 65
End: 2020-05-04

## 2020-05-04 ENCOUNTER — TELEPHONE (OUTPATIENT)
Dept: ORTHOPEDICS | Facility: CLINIC | Age: 65
End: 2020-05-04

## 2020-05-04 ENCOUNTER — LAB VISIT (OUTPATIENT)
Dept: INTERNAL MEDICINE | Facility: CLINIC | Age: 65
End: 2020-05-04
Payer: COMMERCIAL

## 2020-05-04 ENCOUNTER — ANESTHESIA EVENT (OUTPATIENT)
Dept: SURGERY | Facility: HOSPITAL | Age: 65
End: 2020-05-04
Payer: COMMERCIAL

## 2020-05-04 DIAGNOSIS — Z01.818 PRE-OP TESTING: ICD-10-CM

## 2020-05-04 LAB — SARS-COV-2 RNA RESP QL NAA+PROBE: NOT DETECTED

## 2020-05-04 PROCEDURE — U0002 COVID-19 LAB TEST NON-CDC: HCPCS

## 2020-05-04 NOTE — TELEPHONE ENCOUNTER
COVID testing -Negative     Called to follow up , spoke to him  to address any concerns with the up coming surgery or any questions on Medication instructions -  Doing well ,No changes to Medication, Health -  Not on testosterone

## 2020-05-04 NOTE — TELEPHONE ENCOUNTER
I spoke with pt. Informed him that him COVID-19 test came back negative. Pt. Verbalized understanding.        ----- Message from Oli Centeno MD sent at 5/4/2020  9:45 AM CDT -----  Please call.  Negative

## 2020-05-04 NOTE — PLAN OF CARE
Covid negative.    Arrival time of 0730 given for surgery tomorrow at [facility], [address], [address]st [address]. Instructions given on food/fluid intake, medications, COVID info and visitor policy.  Voiced understanding    Contacted patient to review the Covid-19 Procedure/Surgery Confirmation questionnaire.  Patient receptive to questions and all information provided on instructions concerning our temporary temperature, hand washing/sanitizing and visitor policy as per CDC recommendations. No visitors will be allowed into the hospital at this time.  Patient will be dropped off and picked up at the same location.  We will receive permission to text that person with updates,(if patient wishes to do so) as well as when the patient is ready for discharge.  Phone number [phone] provided for patients to call if they were to develop fever, cough or SOB prior to surgery. Instructed to take temperature the night before and the morning of surgery. Travel questions as per travel questionnaire reviewed. Our cleaning protocols for every surgery and every procedure were reviewed, and reassurance provided that safety, as well as following CDC guidelines, are our top priority. Voiced understanding.

## 2020-05-05 ENCOUNTER — PATIENT MESSAGE (OUTPATIENT)
Dept: ADMINISTRATIVE | Facility: OTHER | Age: 65
End: 2020-05-05

## 2020-05-05 ENCOUNTER — HOSPITAL ENCOUNTER (OUTPATIENT)
Facility: HOSPITAL | Age: 65
Discharge: HOME | End: 2020-05-05
Attending: ORTHOPAEDIC SURGERY | Admitting: ORTHOPAEDIC SURGERY
Payer: COMMERCIAL

## 2020-05-05 ENCOUNTER — ANESTHESIA (OUTPATIENT)
Dept: SURGERY | Facility: HOSPITAL | Age: 65
End: 2020-05-05
Payer: COMMERCIAL

## 2020-05-05 VITALS
BODY MASS INDEX: 28.88 KG/M2 | HEART RATE: 71 BPM | DIASTOLIC BLOOD PRESSURE: 76 MMHG | OXYGEN SATURATION: 93 % | WEIGHT: 225 LBS | TEMPERATURE: 98 F | RESPIRATION RATE: 20 BRPM | SYSTOLIC BLOOD PRESSURE: 124 MMHG | HEIGHT: 74 IN

## 2020-05-05 DIAGNOSIS — M17.12 PRIMARY OSTEOARTHRITIS OF LEFT KNEE: ICD-10-CM

## 2020-05-05 DIAGNOSIS — Z96.652 STATUS POST TOTAL LEFT KNEE REPLACEMENT: ICD-10-CM

## 2020-05-05 PROCEDURE — 71000016 HC POSTOP RECOV ADDL HR: Performed by: ORTHOPAEDIC SURGERY

## 2020-05-05 PROCEDURE — 37000008 HC ANESTHESIA 1ST 15 MINUTES: Performed by: ORTHOPAEDIC SURGERY

## 2020-05-05 PROCEDURE — 63600175 PHARM REV CODE 636 W HCPCS: Performed by: PHYSICIAN ASSISTANT

## 2020-05-05 PROCEDURE — C1751 CATH, INF, PER/CENT/MIDLINE: HCPCS | Performed by: ANESTHESIOLOGY

## 2020-05-05 PROCEDURE — 97530 THERAPEUTIC ACTIVITIES: CPT

## 2020-05-05 PROCEDURE — D9220A PRA ANESTHESIA: Mod: CRNA,,, | Performed by: NURSE ANESTHETIST, CERTIFIED REGISTERED

## 2020-05-05 PROCEDURE — 37000009 HC ANESTHESIA EA ADD 15 MINS: Performed by: ORTHOPAEDIC SURGERY

## 2020-05-05 PROCEDURE — 99900035 HC TECH TIME PER 15 MIN (STAT)

## 2020-05-05 PROCEDURE — S0028 INJECTION, FAMOTIDINE, 20 MG: HCPCS | Performed by: NURSE ANESTHETIST, CERTIFIED REGISTERED

## 2020-05-05 PROCEDURE — 64450 PR NERVE BLOCK INJ, ANES/STEROID, OTHER PERIPHERAL: ICD-10-PCS | Mod: 59,LT,, | Performed by: ANESTHESIOLOGY

## 2020-05-05 PROCEDURE — 94761 N-INVAS EAR/PLS OXIMETRY MLT: CPT

## 2020-05-05 PROCEDURE — 36000710: Performed by: ORTHOPAEDIC SURGERY

## 2020-05-05 PROCEDURE — C1713 ANCHOR/SCREW BN/BN,TIS/BN: HCPCS | Performed by: ORTHOPAEDIC SURGERY

## 2020-05-05 PROCEDURE — C1776 JOINT DEVICE (IMPLANTABLE): HCPCS | Performed by: ORTHOPAEDIC SURGERY

## 2020-05-05 PROCEDURE — 64448 NJX AA&/STRD FEM NRV NFS IMG: CPT | Mod: 59,LT,, | Performed by: ANESTHESIOLOGY

## 2020-05-05 PROCEDURE — 63600175 PHARM REV CODE 636 W HCPCS: Performed by: ANESTHESIOLOGY

## 2020-05-05 PROCEDURE — 63600175 PHARM REV CODE 636 W HCPCS: Performed by: NURSE ANESTHETIST, CERTIFIED REGISTERED

## 2020-05-05 PROCEDURE — C2626 INFUSION PUMP, NON-PROG,TEMP: HCPCS | Performed by: PHYSICIAN ASSISTANT

## 2020-05-05 PROCEDURE — 27100025 HC TUBING, SET FLUID WARMER: Performed by: ANESTHESIOLOGY

## 2020-05-05 PROCEDURE — 76942 ECHO GUIDE FOR BIOPSY: CPT | Performed by: ANESTHESIOLOGY

## 2020-05-05 PROCEDURE — 27200750 HC INSULATED NEEDLE/ STIMUPLEX: Performed by: ANESTHESIOLOGY

## 2020-05-05 PROCEDURE — 27201423 OPTIME MED/SURG SUP & DEVICES STERILE SUPPLY: Performed by: ORTHOPAEDIC SURGERY

## 2020-05-05 PROCEDURE — 20985 CPTR-ASST DIR MS PX: CPT | Mod: ,,, | Performed by: ORTHOPAEDIC SURGERY

## 2020-05-05 PROCEDURE — 64448 PR NERVE BLOCK INJ, ANES/STEROID, FEMORAL, CONT INFUSION, INCL IMAG GUIDANCE: ICD-10-PCS | Mod: 59,LT,, | Performed by: ANESTHESIOLOGY

## 2020-05-05 PROCEDURE — 20985 PR CPTR-ASST SURGICAL NAVIGATION IMAGE-LESS: ICD-10-PCS | Mod: ,,, | Performed by: ORTHOPAEDIC SURGERY

## 2020-05-05 PROCEDURE — D9220A PRA ANESTHESIA: ICD-10-PCS | Mod: CRNA,,, | Performed by: NURSE ANESTHETIST, CERTIFIED REGISTERED

## 2020-05-05 PROCEDURE — 27447 PR TOTAL KNEE ARTHROPLASTY: ICD-10-PCS | Mod: AS,LT,, | Performed by: PHYSICIAN ASSISTANT

## 2020-05-05 PROCEDURE — 94770 HC EXHALED C02 TEST: CPT

## 2020-05-05 PROCEDURE — 36000711: Performed by: ORTHOPAEDIC SURGERY

## 2020-05-05 PROCEDURE — 27447 TOTAL KNEE ARTHROPLASTY: CPT | Mod: LT,,, | Performed by: ORTHOPAEDIC SURGERY

## 2020-05-05 PROCEDURE — 27447 PR TOTAL KNEE ARTHROPLASTY: ICD-10-PCS | Mod: LT,,, | Performed by: ORTHOPAEDIC SURGERY

## 2020-05-05 PROCEDURE — 25000003 PHARM REV CODE 250: Performed by: NURSE ANESTHETIST, CERTIFIED REGISTERED

## 2020-05-05 PROCEDURE — D9220A PRA ANESTHESIA: ICD-10-PCS | Mod: ANES,,, | Performed by: ANESTHESIOLOGY

## 2020-05-05 PROCEDURE — 76942 PR U/S GUIDANCE FOR NEEDLE GUIDANCE: ICD-10-PCS | Mod: 26,,, | Performed by: ANESTHESIOLOGY

## 2020-05-05 PROCEDURE — 27447 TOTAL KNEE ARTHROPLASTY: CPT | Mod: AS,LT,, | Performed by: PHYSICIAN ASSISTANT

## 2020-05-05 PROCEDURE — 97161 PT EVAL LOW COMPLEX 20 MIN: CPT

## 2020-05-05 PROCEDURE — 25000003 PHARM REV CODE 250: Performed by: PHYSICIAN ASSISTANT

## 2020-05-05 PROCEDURE — 76942 ECHO GUIDE FOR BIOPSY: CPT | Mod: 26,,, | Performed by: ANESTHESIOLOGY

## 2020-05-05 PROCEDURE — D9220A PRA ANESTHESIA: Mod: ANES,,, | Performed by: ANESTHESIOLOGY

## 2020-05-05 PROCEDURE — 71000015 HC POSTOP RECOV 1ST HR: Performed by: ORTHOPAEDIC SURGERY

## 2020-05-05 PROCEDURE — 64450 NJX AA&/STRD OTHER PN/BRANCH: CPT | Mod: 59,LT,, | Performed by: ANESTHESIOLOGY

## 2020-05-05 PROCEDURE — 64448 NJX AA&/STRD FEM NRV NFS IMG: CPT | Performed by: ANESTHESIOLOGY

## 2020-05-05 PROCEDURE — 71000033 HC RECOVERY, INTIAL HOUR: Performed by: ORTHOPAEDIC SURGERY

## 2020-05-05 DEVICE — IMPLANTABLE DEVICE: Type: IMPLANTABLE DEVICE | Site: KNEE | Status: FUNCTIONAL

## 2020-05-05 DEVICE — BASEPLATE TIB CEM PRIM SZ 6: Type: IMPLANTABLE DEVICE | Site: KNEE | Status: FUNCTIONAL

## 2020-05-05 DEVICE — CEMENT BONE WHOLE BATCH: Type: IMPLANTABLE DEVICE | Site: KNEE | Status: FUNCTIONAL

## 2020-05-05 DEVICE — PATELLA TRIATHLON 31X9 SYMTRC: Type: IMPLANTABLE DEVICE | Site: KNEE | Status: FUNCTIONAL

## 2020-05-05 RX ORDER — OXYCODONE HYDROCHLORIDE 5 MG/1
5 TABLET ORAL
Status: DISCONTINUED | OUTPATIENT
Start: 2020-05-05 | End: 2020-05-05 | Stop reason: HOSPADM

## 2020-05-05 RX ORDER — SODIUM CHLORIDE 0.9 % (FLUSH) 0.9 %
10 SYRINGE (ML) INJECTION
Status: DISCONTINUED | OUTPATIENT
Start: 2020-05-05 | End: 2020-05-05 | Stop reason: HOSPADM

## 2020-05-05 RX ORDER — ONDANSETRON 2 MG/ML
INJECTION INTRAMUSCULAR; INTRAVENOUS
Status: DISCONTINUED | OUTPATIENT
Start: 2020-05-05 | End: 2020-05-05

## 2020-05-05 RX ORDER — ASPIRIN 81 MG/1
81 TABLET ORAL 2 TIMES DAILY
Status: DISCONTINUED | OUTPATIENT
Start: 2020-05-05 | End: 2020-05-05 | Stop reason: HOSPADM

## 2020-05-05 RX ORDER — ONDANSETRON 2 MG/ML
4 INJECTION INTRAMUSCULAR; INTRAVENOUS EVERY 8 HOURS PRN
Status: DISCONTINUED | OUTPATIENT
Start: 2020-05-05 | End: 2020-05-05 | Stop reason: HOSPADM

## 2020-05-05 RX ORDER — NALOXONE HCL 0.4 MG/ML
0.02 VIAL (ML) INJECTION
Status: DISCONTINUED | OUTPATIENT
Start: 2020-05-05 | End: 2020-05-05 | Stop reason: HOSPADM

## 2020-05-05 RX ORDER — MIDAZOLAM HYDROCHLORIDE 1 MG/ML
INJECTION, SOLUTION INTRAMUSCULAR; INTRAVENOUS
Status: DISCONTINUED | OUTPATIENT
Start: 2020-05-05 | End: 2020-05-05

## 2020-05-05 RX ORDER — AMOXICILLIN 250 MG
1 CAPSULE ORAL 2 TIMES DAILY
Status: DISCONTINUED | OUTPATIENT
Start: 2020-05-05 | End: 2020-05-05 | Stop reason: HOSPADM

## 2020-05-05 RX ORDER — CEFAZOLIN SODIUM 1 G/3ML
2 INJECTION, POWDER, FOR SOLUTION INTRAMUSCULAR; INTRAVENOUS
Status: COMPLETED | OUTPATIENT
Start: 2020-05-05 | End: 2020-05-05

## 2020-05-05 RX ORDER — PROPOFOL 10 MG/ML
VIAL (ML) INTRAVENOUS CONTINUOUS PRN
Status: DISCONTINUED | OUTPATIENT
Start: 2020-05-05 | End: 2020-05-05

## 2020-05-05 RX ORDER — LIDOCAINE HYDROCHLORIDE 10 MG/ML
1 INJECTION, SOLUTION EPIDURAL; INFILTRATION; INTRACAUDAL; PERINEURAL
Status: DISCONTINUED | OUTPATIENT
Start: 2020-05-05 | End: 2020-05-05 | Stop reason: HOSPADM

## 2020-05-05 RX ORDER — FENTANYL CITRATE 50 UG/ML
25 INJECTION, SOLUTION INTRAMUSCULAR; INTRAVENOUS EVERY 5 MIN PRN
Status: DISCONTINUED | OUTPATIENT
Start: 2020-05-05 | End: 2020-05-05 | Stop reason: HOSPADM

## 2020-05-05 RX ORDER — FAMOTIDINE 20 MG/1
20 TABLET, FILM COATED ORAL 2 TIMES DAILY
Status: DISCONTINUED | OUTPATIENT
Start: 2020-05-05 | End: 2020-05-05 | Stop reason: HOSPADM

## 2020-05-05 RX ORDER — PROPOFOL 10 MG/ML
VIAL (ML) INTRAVENOUS
Status: DISCONTINUED | OUTPATIENT
Start: 2020-05-05 | End: 2020-05-05

## 2020-05-05 RX ORDER — OXYCODONE HYDROCHLORIDE 5 MG/1
10 TABLET ORAL
Status: DISCONTINUED | OUTPATIENT
Start: 2020-05-05 | End: 2020-05-05 | Stop reason: HOSPADM

## 2020-05-05 RX ORDER — DEXAMETHASONE SODIUM PHOSPHATE 4 MG/ML
INJECTION, SOLUTION INTRA-ARTICULAR; INTRALESIONAL; INTRAMUSCULAR; INTRAVENOUS; SOFT TISSUE
Status: DISCONTINUED | OUTPATIENT
Start: 2020-05-05 | End: 2020-05-05

## 2020-05-05 RX ORDER — ACETAMINOPHEN 500 MG
1000 TABLET ORAL
Status: COMPLETED | OUTPATIENT
Start: 2020-05-05 | End: 2020-05-05

## 2020-05-05 RX ORDER — KETAMINE HYDROCHLORIDE 100 MG/ML
INJECTION, SOLUTION INTRAMUSCULAR; INTRAVENOUS
Status: DISCONTINUED | OUTPATIENT
Start: 2020-05-05 | End: 2020-05-05

## 2020-05-05 RX ORDER — MUPIROCIN 20 MG/G
1 OINTMENT TOPICAL
Status: COMPLETED | OUTPATIENT
Start: 2020-05-05 | End: 2020-05-05

## 2020-05-05 RX ORDER — FENTANYL CITRATE 50 UG/ML
25 INJECTION, SOLUTION INTRAMUSCULAR; INTRAVENOUS EVERY 5 MIN PRN
Status: COMPLETED | OUTPATIENT
Start: 2020-05-05 | End: 2020-05-05

## 2020-05-05 RX ORDER — PREGABALIN 75 MG/1
75 CAPSULE ORAL NIGHTLY
Status: DISCONTINUED | OUTPATIENT
Start: 2020-05-05 | End: 2020-05-05 | Stop reason: HOSPADM

## 2020-05-05 RX ORDER — MIDAZOLAM HYDROCHLORIDE 1 MG/ML
1 INJECTION INTRAMUSCULAR; INTRAVENOUS EVERY 5 MIN PRN
Status: DISCONTINUED | OUTPATIENT
Start: 2020-05-05 | End: 2020-05-05 | Stop reason: HOSPADM

## 2020-05-05 RX ORDER — CELECOXIB 200 MG/1
400 CAPSULE ORAL
Status: COMPLETED | OUTPATIENT
Start: 2020-05-05 | End: 2020-05-05

## 2020-05-05 RX ORDER — FAMOTIDINE 10 MG/ML
INJECTION INTRAVENOUS
Status: DISCONTINUED | OUTPATIENT
Start: 2020-05-05 | End: 2020-05-05

## 2020-05-05 RX ORDER — LIDOCAINE HYDROCHLORIDE 20 MG/ML
INJECTION INTRAVENOUS
Status: DISCONTINUED | OUTPATIENT
Start: 2020-05-05 | End: 2020-05-05

## 2020-05-05 RX ORDER — POLYETHYLENE GLYCOL 3350 17 G/17G
17 POWDER, FOR SOLUTION ORAL DAILY
Status: DISCONTINUED | OUTPATIENT
Start: 2020-05-05 | End: 2020-05-05 | Stop reason: HOSPADM

## 2020-05-05 RX ORDER — SODIUM CHLORIDE 9 MG/ML
INJECTION, SOLUTION INTRAVENOUS CONTINUOUS PRN
Status: DISCONTINUED | OUTPATIENT
Start: 2020-05-05 | End: 2020-05-05

## 2020-05-05 RX ORDER — SODIUM CHLORIDE 9 MG/ML
INJECTION, SOLUTION INTRAVENOUS
Status: COMPLETED | OUTPATIENT
Start: 2020-05-05 | End: 2020-05-05

## 2020-05-05 RX ORDER — CEFAZOLIN SODIUM 1 G/3ML
2 INJECTION, POWDER, FOR SOLUTION INTRAMUSCULAR; INTRAVENOUS
Status: DISCONTINUED | OUTPATIENT
Start: 2020-05-05 | End: 2020-05-05 | Stop reason: HOSPADM

## 2020-05-05 RX ORDER — PHENYLEPHRINE HYDROCHLORIDE 10 MG/ML
INJECTION INTRAVENOUS
Status: DISCONTINUED | OUTPATIENT
Start: 2020-05-05 | End: 2020-05-05

## 2020-05-05 RX ORDER — PREGABALIN 75 MG/1
150 CAPSULE ORAL
Status: COMPLETED | OUTPATIENT
Start: 2020-05-05 | End: 2020-05-05

## 2020-05-05 RX ORDER — SODIUM CHLORIDE 9 MG/ML
INJECTION, SOLUTION INTRAVENOUS CONTINUOUS
Status: DISCONTINUED | OUTPATIENT
Start: 2020-05-05 | End: 2020-05-05 | Stop reason: HOSPADM

## 2020-05-05 RX ORDER — BISACODYL 10 MG
10 SUPPOSITORY, RECTAL RECTAL EVERY 12 HOURS PRN
Status: DISCONTINUED | OUTPATIENT
Start: 2020-05-05 | End: 2020-05-05 | Stop reason: HOSPADM

## 2020-05-05 RX ORDER — ACETAMINOPHEN 500 MG
1000 TABLET ORAL EVERY 6 HOURS
Status: DISCONTINUED | OUTPATIENT
Start: 2020-05-05 | End: 2020-05-05 | Stop reason: HOSPADM

## 2020-05-05 RX ADMIN — PROPOFOL 100 MCG/KG/MIN: 10 INJECTION, EMULSION INTRAVENOUS at 09:05

## 2020-05-05 RX ADMIN — SODIUM CHLORIDE 1000 ML: 0.9 INJECTION, SOLUTION INTRAVENOUS at 12:05

## 2020-05-05 RX ADMIN — DEXAMETHASONE SODIUM PHOSPHATE 8 MG: 4 INJECTION, SOLUTION INTRAMUSCULAR; INTRAVENOUS at 10:05

## 2020-05-05 RX ADMIN — PROPOFOL 30 MG: 10 INJECTION, EMULSION INTRAVENOUS at 09:05

## 2020-05-05 RX ADMIN — MIDAZOLAM HYDROCHLORIDE 2 MG: 1 INJECTION, SOLUTION INTRAMUSCULAR; INTRAVENOUS at 08:05

## 2020-05-05 RX ADMIN — OXYCODONE HYDROCHLORIDE 10 MG: 5 TABLET ORAL at 12:05

## 2020-05-05 RX ADMIN — VANCOMYCIN HYDROCHLORIDE 1500 MG: 1.5 INJECTION, POWDER, LYOPHILIZED, FOR SOLUTION INTRAVENOUS at 08:05

## 2020-05-05 RX ADMIN — LIDOCAINE HYDROCHLORIDE 100 MG: 20 INJECTION, SOLUTION INTRAVENOUS at 09:05

## 2020-05-05 RX ADMIN — FENTANYL CITRATE 25 MCG: 50 INJECTION INTRAMUSCULAR; INTRAVENOUS at 12:05

## 2020-05-05 RX ADMIN — FAMOTIDINE 20 MG: 10 INJECTION, SOLUTION INTRAVENOUS at 10:05

## 2020-05-05 RX ADMIN — SODIUM CHLORIDE: 0.9 INJECTION, SOLUTION INTRAVENOUS at 08:05

## 2020-05-05 RX ADMIN — MIDAZOLAM 2 MG: 1 INJECTION INTRAMUSCULAR; INTRAVENOUS at 09:05

## 2020-05-05 RX ADMIN — CEFAZOLIN 2 G: 330 INJECTION, POWDER, FOR SOLUTION INTRAMUSCULAR; INTRAVENOUS at 09:05

## 2020-05-05 RX ADMIN — CELECOXIB 400 MG: 200 CAPSULE ORAL at 08:05

## 2020-05-05 RX ADMIN — FENTANYL CITRATE 100 MCG: 50 INJECTION INTRAMUSCULAR; INTRAVENOUS at 08:05

## 2020-05-05 RX ADMIN — PHENYLEPHRINE HYDROCHLORIDE 200 MCG: 10 INJECTION INTRAVENOUS at 10:05

## 2020-05-05 RX ADMIN — KETAMINE HYDROCHLORIDE 25 MG: 100 INJECTION, SOLUTION, CONCENTRATE INTRAMUSCULAR; INTRAVENOUS at 09:05

## 2020-05-05 RX ADMIN — ONDANSETRON 4 MG: 2 INJECTION INTRAMUSCULAR; INTRAVENOUS at 10:05

## 2020-05-05 RX ADMIN — ROPIVACAINE HYDROCHLORIDE: 2 INJECTION, SOLUTION EPIDURAL; INFILTRATION at 12:05

## 2020-05-05 RX ADMIN — SODIUM CHLORIDE: 0.9 INJECTION, SOLUTION INTRAVENOUS at 09:05

## 2020-05-05 RX ADMIN — MUPIROCIN 1 G: 20 OINTMENT TOPICAL at 08:05

## 2020-05-05 RX ADMIN — SODIUM CHLORIDE, SODIUM GLUCONATE, SODIUM ACETATE, POTASSIUM CHLORIDE, MAGNESIUM CHLORIDE, SODIUM PHOSPHATE, DIBASIC, AND POTASSIUM PHOSPHATE: .53; .5; .37; .037; .03; .012; .00082 INJECTION, SOLUTION INTRAVENOUS at 10:05

## 2020-05-05 RX ADMIN — PREGABALIN 150 MG: 75 CAPSULE ORAL at 08:05

## 2020-05-05 RX ADMIN — ACETAMINOPHEN 1000 MG: 500 TABLET ORAL at 08:05

## 2020-05-05 NOTE — TRANSFER OF CARE
"Anesthesia Transfer of Care Note    Patient: Hilario Thomas    Procedure(s) Performed: Procedure(s) (LRB):  ARTHROPLASTY,KNEE,TOTAL,USING COMPUTER-ASSISTED NAVIGATION SAME DAY (Left)    Patient location: PACU    Anesthesia Type: general    Transport from OR: Transported from OR on 6-10 L/min O2 by face mask with adequate spontaneous ventilation    Post pain: adequate analgesia    Post assessment: no apparent anesthetic complications and tolerated procedure well    Post vital signs: stable    Level of consciousness: awake and alert    Nausea/Vomiting: no nausea/vomiting    Complications: none    Transfer of care protocol was followed      Last vitals:   Visit Vitals  BP (!) 94/55 (BP Location: Right arm, Patient Position: Lying)   Pulse 82   Temp 36.4 °C (97.5 °F) (Temporal)   Resp (!) 22   Ht 6' 2" (1.88 m)   Wt 102.1 kg (225 lb)   SpO2 95%   BMI 28.89 kg/m²     "

## 2020-05-05 NOTE — TRANSFER OF CARE
"Anesthesia Transfer of Care Note    Patient: Hilario Thomas    Procedure(s) Performed: Procedure(s) (LRB):  ARTHROPLASTY,KNEE,TOTAL,USING COMPUTER-ASSISTED NAVIGATION SAME DAY (Left)    Anesthesia PACU Handoff    Last vitals:   Visit Vitals  BP (!) 94/55 (BP Location: Right arm, Patient Position: Lying)   Pulse 82   Temp 36.4 °C (97.5 °F) (Temporal)   Resp (!) 22   Ht 6' 2" (1.88 m)   Wt 102.1 kg (225 lb)   SpO2 95%   BMI 28.89 kg/m²     "

## 2020-05-05 NOTE — DISCHARGE INSTRUCTIONS
Take aspirin 81 mg twice daily for DVT prophylaxis.    Recovery After Procedural Sedation (Adult)  You have been given medicine by vein to make you sleep during your surgery. This may have included both a pain medicine and sleeping medicine. Most of the effects have worn off. But you may still have some drowsiness for the next 6 to 8 hours.  Home care  Follow these guidelines when you get home:  · For the next 8 hours, you should be watched by a responsible adult. This person should make sure your condition is not getting worse.  · Don't drink any alcohol for the next 24 hours.  · Don't drive, operate dangerous machinery, or make important business or personal decisions during the next 24 hours.  Note: Your healthcare provider may tell you not to take any medicine by mouth for pain or sleep in the next 4 hours. These medicines may react with the medicines you were given in the hospital. This could cause a much stronger response than usual.  Follow-up care  Follow up with your healthcare provider if you are not alert and back to your usual level of activity within 12 hours.  When to seek medical advice  Call your healthcare provider right away if any of these occur:  · Drowsiness gets worse  · Weakness or dizziness gets worse  · Repeated vomiting  · You can't be awakened   Date Last Reviewed: 10/18/2016  © 4581-9281 [employer]. [address] [place] [address], [place] [address]. All rights reserved. This information is not intended as a substitute for professional medical care. Always follow your healthcare professional's instructions.PATIENT INSTRUCTIONS  POST-ANESTHESIA    IMMEDIATELY FOLLOWING SURGERY:  Do not drive or operate machinery for the first twenty four hours after surgery.  Do not make any important decisions for twenty four hours after surgery or while taking narcotic pain medications or sedatives.  If you develop intractable nausea and vomiting or a severe headache please notify your doctor  immediately.    FOLLOW-UP:  Please make an appointment with your surgeon as instructed. You do not need to follow up with anesthesia unless specifically instructed to do so.    WOUND CARE INSTRUCTIONS (if applicable):  Keep a dry clean dressing on the anesthesia/puncture wound site if there is drainage.  Once the wound has quit draining you may leave it open to air.  Generally you should leave the bandage intact for twenty four hours unless there is drainage.  If the epidural site drains for more than 36-48 hours please call the anesthesia department.    QUESTIONS?:  Please feel free to call your physician or the hospital  if you have any questions, and they will be happy to assist you.       [facility] Anesthesia Department  [address] [facility] [address]  [phone]

## 2020-05-05 NOTE — PLAN OF CARE
"Patient tolerated PT session well. He ambulated 150ft with SW and SBA. No LOB or SOB noted. He ascended/descended 2 4" curb steps with SW and CGA. He performed L LE therex x10 reps. He has an OPPT appointment for 5/7/2020. He is ready to discharge home from PT standpoint.   "

## 2020-05-05 NOTE — PLAN OF CARE
Patient is AAO and VSS.  Tolerating PO and states pain is tolerable.  Dressing CDI.  Patient states they are ready for d/c.  IV removed.  Catheter tip intact.  Caregiver at bedside.  Discharge instructions reviewed and copy given to the patient and caregiver.  Questions answered.  Both verbalized understanding.  Medication delivered to bedside.   Patient's own DME at bedside. Patient wheeled to car by RN/PCT.

## 2020-05-05 NOTE — PLAN OF CARE
Pre-op complete, all questions answered, pt is stable and ready for next phase of care. Pt has a walker.

## 2020-05-05 NOTE — ANESTHESIA PROCEDURE NOTES
Acc    Patient location during procedure: pre-op   Block not for primary anesthetic.  Reason for block: at surgeon's request and post-op pain management   Post-op Pain Location: left knee  Start time: 5/5/2020 9:02 AM  Timeout: 5/5/2020 9:00 AM   End time: 5/5/2020 9:15 AM    Staffing  Authorizing Provider: Primo Maya MD  Performing Provider: Primo Maya MD    Preanesthetic Checklist  Completed: patient identified, site marked, surgical consent, pre-op evaluation, timeout performed, IV checked, risks and benefits discussed and monitors and equipment checked  Peripheral Block  Patient position: supine  Prep: ChloraPrep and site prepped and draped  Patient monitoring: heart rate, cardiac monitor, continuous pulse ox, continuous capnometry and frequent blood pressure checks  Block type: adductor canal  Laterality: left  Injection technique: continuous  Needle  Needle type: Tuohy   Needle gauge: 17 G  Needle length: 3.5 in  Needle localization: anatomical landmarks and ultrasound guidance  Catheter type: spring wound  Catheter size: 19 G  Test dose: lidocaine 1.5% with Epi 1-to-200,000 and negative   -ultrasound image captured on disc.  Assessment  Injection assessment: negative aspiration, negative parasthesia and local visualized surrounding nerve  Paresthesia pain: none  Heart rate change: no  Slow fractionated injection: yes  Additional Notes  VSS.  [facility] RN monitoring vitals throughout procedure.  Patient tolerated procedure well.

## 2020-05-05 NOTE — ANESTHESIA PROCEDURE NOTES
Spinal    Diagnosis: left knee pain  Patient location during procedure: OR  Start time: 5/5/2020 9:45 AM  Timeout: 5/5/2020 9:44 AM  End time: 5/5/2020 9:50 AM    Staffing  Authorizing Provider: Primo Maya MD  Performing Provider: Primo Maya MD    Preanesthetic Checklist  Completed: patient identified, site marked, surgical consent, pre-op evaluation, timeout performed, IV checked, risks and benefits discussed and monitors and equipment checked  Spinal Block  Patient position: sitting  Prep: ChloraPrep  Patient monitoring: heart rate, frequent blood pressure checks and continuous pulse ox  Approach: midline  Location: L3-4  Injection technique: single shot  CSF Fluid: clear free-flowing CSF  Needle  Needle type: pencil-tip   Needle gauge: 25 G  Needle length: 3.5 in  Additional Documentation: incremental injection, negative aspiration for heme and no paresthesia on injection  Needle localization: anatomical landmarks  Assessment  Sensory level: T6   Dermatomal levels determined by alcohol wipe  Ease of block: easy  Patient's tolerance of the procedure: comfortable throughout block and no complaints

## 2020-05-05 NOTE — ANESTHESIA PROCEDURE NOTES
Ipack    Patient location during procedure: pre-op   Block not for primary anesthetic.  Reason for block: at surgeon's request and post-op pain management   Post-op Pain Location: left knee  Start time: 5/5/2020 9:02 AM  Timeout: 5/5/2020 9:00 AM   End time: 5/5/2020 9:15 AM    Staffing  Authorizing Provider: Primo Maya MD  Performing Provider: Primo Maya MD    Preanesthetic Checklist  Completed: patient identified, site marked, surgical consent, pre-op evaluation, timeout performed, IV checked, risks and benefits discussed and monitors and equipment checked  Peripheral Block  Patient position: supine  Prep: ChloraPrep  Patient monitoring: heart rate, cardiac monitor, continuous pulse ox, continuous capnometry and frequent blood pressure checks  Block type: I PACK  Laterality: left  Injection technique: single shot  Needle  Needle type: Stimuplex   Needle gauge: 21 G  Needle length: 4 in  Needle localization: anatomical landmarks and ultrasound guidance   -ultrasound image captured on disc.  Assessment  Injection assessment: negative aspiration, negative parasthesia and local visualized surrounding nerve  Paresthesia pain: none  Heart rate change: no  Slow fractionated injection: yes  Additional Notes  VSS.  [facility] RN monitoring vitals throughout procedure.  Patient tolerated procedure well.

## 2020-05-05 NOTE — PT/OT/SLP EVAL
"Physical Therapy Evaluation, Treatment, and Discharge Note    Patient Name:  Hilario Thomas   MRN:  [id number]    Recommendations:     Discharge Recommendations:  outpatient PT(5/7/2020)   Discharge Equipment Recommendations: none   Barriers to discharge: Inaccessible home    Assessment:     Hilario Thomas is a 65 y.o. male admitted with a medical diagnosis of s/p L TKA. Patient tolerated PT session well. He ambulated 150ft with SW and SBA. No LOB or SOB noted. He ascended/descended 2 4" curb steps with SW and CGA. He performed L LE therex x10 reps. He has an OPPT appointment for 5/7/2020. He is ready to discharge home from PT standpoint.     Recent Surgery: Procedure(s) (LRB):  ARTHROPLASTY,KNEE,TOTAL,USING COMPUTER-ASSISTED NAVIGATION SAME DAY (Left) Day of Surgery    Plan:     During this hospitalization, patient does not require further acute PT services.  Please re-consult if situation changes.      Subjective     Chief Complaint: Pain in left knee.   Patient/Family Comments/goals: To walk without pain.   Pain/Comfort:  · Pain Rating 1: 6/10  · Location - Side 1: Left  · Location - Orientation 1: generalized  · Location 1: knee  · Pain Addressed 1: Pre-medicate for activity, Reposition, Distraction, Cessation of Activity, Nurse notified  · Pain Rating Post-Intervention 1: 6/10    Living Environment:  Patient lives with his wife in a H with 2 steps and no handrails to enter. Prior to admission, patients level of function was independent for functional mobility. Equipment used at home: walker, standard. Upon discharge, patient will have assistance from wife and daughter.    Objective:     Communicated with RN prior to session.  Patient found supine with blood pressure cuff, FCD, cryotherapy, peripheral IV, telemetry, pulse ox (continuous)(OnQ) upon PT entry to room.    General Precautions: Standard, fall   Orthopedic Precautions:LLE weight bearing as tolerated   Braces: N/A     Exams:  · Cognitive Exam:  " "Patient is oriented to Person, Place, Time and Situation  · Sensation:    · -       Intact  · RUE ROM: WFL  · RUE Strength: WFL  · LUE ROM: WFL  · LUE Strength: WFL  · RLE ROM: WFL  · RLE Strength: WFL  · LLE ROM: WFL but limited at knee due to pain from recent surgery   · LLE Strength: WFL but limited at knee due to pain from recent surgery     Functional Mobility:  · Bed Mobility:     · Scooting: stand by assistance  · Supine to Sit: stand by assistance  · Sit to Supine: stand by assistance  · Transfers:     · Sit to Stand:  contact guard assistance with rolling walker x1 from bed with verbal cues for hand placement   · Gait:  Patient ambulated 150ft with Standard Walker and SBA using 3-point gait. Patient demonstrated decreased ruby and decreased step length during gait due to impaired balance, pain and decreased strength. Verbal cues provided for gait sequence and RW management.   · Stairs:  Pt ascended/descended two 4" curb step with Standard Walker (1 with open walker and 1 with folded walker due to patient unsure if walker will fit due to narrow steps) with Contact Guard Assistance. Verbal cues provided for technique.       Therapeutic Activities and Exercises:  Patient educated in and performed L LE exercises x10 reps for ankle pumps, quad set, glute set, SAQ over bolster, heel slides, hip abd/add, SLR, and LAQ.    Patient educated in:  -PT role and POC  -safety with transfers including hand placement  -gait sequencing and SW management  -OOB activity to maximize recovery including ambulating every 2 hours to prevent DVT   -car transfer  -stair training  -HEP for therex at home with handout provided   -polar ice use and management       AM-PAC 6 CLICK MOBILITY  Total Score:23     Patient left supine with all lines intact, call button in reach and RN and MD notified that patient okay to discharge from PT standpoint.    GOALS:   Multidisciplinary Problems     Physical Therapy Goals     Not on file          " Multidisciplinary Problems (Resolved)        Problem: Physical Therapy Goal    Goal Priority Disciplines Outcome Goal Variances Interventions   Physical Therapy Goal   (Resolved)     PT, PT/OT Met                     History:     Past Medical History:   Diagnosis Date    Abnormal ECG 5/31/2016 5/31/2016: ECG: Inferior Q waves.    Arthritis     Family history of ischemic heart disease 5/31/2016    Mother: Age 55: Myocardial infarction.       Past Surgical History:   Procedure Laterality Date    KNEE SURGERY         Time Tracking:     PT Received On: 05/05/20  PT Start Time: 1348     PT Stop Time: 1413  PT Total Time (min): 25 min     Billable Minutes: Evaluation  10  and Therapeutic Activity 15    Malia Mayfield, PT  05/05/2020

## 2020-05-05 NOTE — OP NOTE
DATE OF PROCEDURE:  5/5/2020.     PREOPERATIVE DIAGNOSIS:  Arthritis, left knee.     POSTOPERATIVE DIAGNOSIS:  Arthritis, left knee.     PROCEDURES PERFORMED:  Robotically-assisted leftt total knee arthroplasty.     SURGEON:  Oli Centeno M.D.     ASSISTANT:  Mariel Quintanilla PA-C (due to the fact that there was no available   qualified resident, Physician's Assistant Mariel Quintanilla acted as first   assistant during this case).        ANESTHESIA:  Regional.     COMPLICATIONS:  None.     COUNTS:  Correct.     DISPOSITION:  Recovery Room, stable.     SPECIMENS:  Bone and cartilage.     FINDINGS:  Arthritis.     FLUIDS:  2000 mL.     ESTIMATED BLOOD LOSS:  <50cc     IMPLANTS:  [place] Triathlon size 5 left  Triathlon cruciate retaining femoral component and a size 6 primary tibial baseplate, 31 mm patella and a size 6, 11 mm   CS tibial insert.     INDICATIONS FOR PROCEDURE:   Hilario Thomas  is a 65-year-old male who is   having symptoms of left knee pain.  Physical examination and imaging studies   were consistent with arthritis.Treatment options were explained and it was decided   to proceed with robotically-assisted left total knee arthroplasty.  He was   aware of reasonable treatment options as well as risks and benefits.       PROCEDURE IN DETAIL:  After appropriate consent was obtained, the patient   brought in the Operating Room, anesthesia was administered.  He received   antibiotic prophylaxis.  Cast padding and tourniquet was applied to the proximal  left thigh.  left lower extremity was then prepped and draped in usual sterile   fashion.  The leg colin was applied.  Timeout was called.  Limb was elevated   and tourniquet was inflated.  The knee was flexed.  An incision was made from   the tibial tubercle just proximal to the superior pole of the patella.  It was   taken down through the skin and retinacular.  A medial parapatellar arthrotomy   was performed followed by a standard medial release.   Following this ACL was resected, fat pad   was excised.  The patella was everted.  It was measured and found to be 24 mm,  8 mm of bone removed and the 3 peg holes were drilled for the 31 mm patella.    Following this, 2 stab incisions were made 4 fingerbreadths below the tibial   tubercle on the medial aspect of the tibial crest.  The 2 guide pins were placed   along with the fixation device through these.  The array was applied and   tightened to the clamp. We checked the security of the array and it was fine. Following this, we placed the femoral pins 1 cm superior and anterior to the MCL insertion.  The check point was placed in between the pins. The guide clamp and array were secured..  Once this was accomplished, the hip center was obtained, the   medial and lateral malleoli were demarcated.  The femoral check point and tibial   check point were placed and the femur and tibia were then registered.    Acceptable registration was obtained.  We then captured poses in extension,   early flexion, and approximately 90 degrees of flexion and initially he had alignment of 12 degrees varus and 5  Degrees flexion .  Gaps were measured The extension gaps were 15 medially and 25 laterally.  The flexion gaps were 12 medially and 15 laterally.  Component   position was adjusted.  We were very satisfied with the component position and   sizing.  We had a size 5 femur and a 6 tibia.  Once this was accomplished, the   robotic arm was brought in and our cuts were made.  The distal femur and   posterior chamfers were cut first followed by anterior chamfers, posterior   chamfers, posterior femur, and then tibia.  We were very satisfied all the cuts.  We   removed osteophytes, removed the meniscal remnants.  The PCL was intact and   robust.  We placed the tibial trial.  We had good coverage with this.  We used   the medial one-third of the tibial tubercle to guide rotation and the trial was pinned in   place.  An 11 mm insert was  placed and then the femoral component was placed.    This was centered on the femur and the knee was brought through a range of   motion.  He was very well balanced in flexion with 21 mm gaps medially and 22mm  laterally.  In extension, there was an 22 mm gap medially 23 mm gap laterally.    Clinically,there was excellent balance and stability. Final alignment was 2 degrees flxion and 5 degrees varus Trial   patellar button was placed.  Patella tracked well.  Therefore, at this point, we   were satisfied with knee range of motion and stability, component position,   sizing and alignment as well as patella tracking.  It should be noted that he  had full extension and he had at least 130 degrees of flexion and there was no   instability at full extension, midflexion, or deep flexion.  Trial components   were removed.  Arrays and femoral and tibial pins were removed. The bone was then prepared for cementing for pulsatile lavage and   drying. Components were then cemented into   place. Tibia followed by femur.  Cement was applied to the tibial keel as   well.  Excess cement was removed.  The tibial insert was firmly seated.  The knee was inspected.  There was no loose body, foreign body, or soft tissue interposition.  It was   then reduced.  Patella button was cemented in place.  Once the cement was dry,   the knee was irrigated with Betadine solution.  Following this, I was irrigated   with pulsatile lavage.  This was periodically done throughout the closure.  The   incision was then closed.  The arthrotomy was closed with #1 Vicryl.  Once the   arthrotomy was closed, the knee was brought through range of motion and was   stable as previously described and the patella tracked well.  The remainder of   the incision was closed with 0 Vicryl, 2-0 Vicryl, Monocryl, and Dermabond.    The stab incisions were closed with Vicryl and Dermabond.  It should be noted   that all check points were removed as well as the femoral and  tibial pins.    Sterile dressing was applied.  He was transferred from the Operating Room table   to a stretcher, brought to Recovery Room in stable condition.  He tolerated the   procedure well and there were no known complications.

## 2020-05-05 NOTE — INTERVAL H&P NOTE
Hilario Thomas was interviewed, examined and the H and P reviewed.  There has been no interval change in his History and Physical. Hilario Thomas is aware that the COVID19 virus is present and active in this region, and the risks associated with this.  We have discussed the precautions that we will take before, during, and after surgery to reduce the risk of exposure to the virus.  Delaying surgery was also discussed.  The decision was made to proceed with surgery at the present time.

## 2020-05-05 NOTE — ANESTHESIA PREPROCEDURE EVALUATION
05/05/2020  Hilario Thomas is a 65 y.o., male.    Anesthesia Evaluation         Review of Systems  Anesthesia Hx:  No problems with previous Anesthesia Denies Family Hx of Anesthesia complications.   Denies Personal Hx of Anesthesia complications.   Social:  Former Smoker, Social Alcohol Use    Cardiovascular:    Denies Angina.  Functional Capacity good / => 4 METS    Pulmonary:   Denies Shortness of breath.  Denies Recent URI.  Possible Obstructive Sleep Apnea , (STOP/BANG) Symptoms S - Snoring (loud), A - Age > 50 and G - Gender (Male > Female)        Musculoskeletal:  Musculoskeletal General/Symptoms: joint pain. Functional capacity is ambulatory without assistance.  Joint Disease:  Arthritis, Osteoarthritis    Neurological:  Pain , onset is chronic , location of knee , alleviating factors are mobic prn. Osteoarthritis    Psych:  Psychiatric Normal           Physical Exam  General:  Well nourished    Airway/Jaw/Neck:  Jaw/Neck Findings: (per pt) Neck ROM: Extension Decreased, Mild      Dental:  Dental Findings: In tact        Mental Status:  Mental Status Findings:  Cooperative, Alert and Oriented         Anesthesia Plan  Type of Anesthesia, risks & benefits discussed:  Anesthesia Type:  general, CSE, epidural, regional, spinal  Patient's Preference:   Intra-op Monitoring Plan: standard ASA monitors  Intra-op Monitoring Plan Comments:   Post Op Pain Control Plan:   Post Op Pain Control Plan Comments:   Induction:   IV  Beta Blocker:  Patient is not currently on a Beta-Blocker (No further documentation required).       Informed Consent: Patient understands risks and agrees with Anesthesia plan.  Questions answered. Anesthesia consent signed with patient.  ASA Score: 3     Day of Surgery Review of History & Physical:    H&P update referred to the surgeon.         Ready For Surgery From Anesthesia  Perspective.     Patient Active Problem List   Diagnosis    Family history of ischemic heart disease    Abnormal ECG    Primary osteoarthritis of both knees    Erectile dysfunction    Family history of diabetes mellitus    Edema    Status post total left knee replacement 5/5/2020    Arrhythmia

## 2020-05-05 NOTE — PROGRESS NOTES
Spoke with Dr. Maya regarding pt's order for Celebrex 400mg. Pt has no recent labs. Per Dr. Maya, ok for pt to take ordered dose.

## 2020-05-05 NOTE — BRIEF OP NOTE
Comfortable  Vitals Stable  Dressing Dry/Intact  Bilateral lower extremities: Palpable DP, good capillary refill  Motor sensation intact  xrays taken today demonstrate a well fixed and positioned TKA.  S/P TKA  Ready for d/c

## 2020-05-05 NOTE — PROGRESS NOTES
On-Q teaching done with patients wife, Cristin Thomas, over the phone. Verbalizes understanding. She agrees to stay with patient for the next 72 hours while medication is infusing. All questions answered. On-Q contract reviewed, home care instruction pamphlet and extra home care supplies provided to patient upon discharge. Encouraged to contact anesthesia with any questions/concerns.

## 2020-05-05 NOTE — OPERATIVE NOTE ADDENDUM
Certification of Assistant at Surgery       Surgery Date: 5/5/2020     Participating Surgeons:  Surgeon(s) and Role:     * Oli Centeno MD - Primary    Procedures:  Procedure(s) (LRB):  ARTHROPLASTY,KNEE,TOTAL,USING COMPUTER-ASSISTED NAVIGATION SAME DAY (Left)    Assistant Surgeon's Certification of Necessity:  I understand that section 1842 (b) (6) (d) of the Social Security Act generally prohibits Medicare Part B reasonable charge payment for the services of assistants at surgery in teaching hospitals when qualified residents are available to furnish such services. I certify that the services for which payment is claimed were medically necessary, and that no qualified resident was available to perform the services. I further understand that these services are subject to post-payment review by the Medicare carrier.      Mariel Quintanilla PA-C    05/05/2020  12:28 PM

## 2020-05-06 ENCOUNTER — PATIENT MESSAGE (OUTPATIENT)
Dept: ADMINISTRATIVE | Facility: OTHER | Age: 65
End: 2020-05-06

## 2020-05-06 NOTE — DISCHARGE SUMMARY
[facility]  Discharge Note  Short Stay    Procedure(s) (LRB):  ARTHROPLASTY,KNEE,TOTAL,USING COMPUTER-ASSISTED NAVIGATION SAME DAY (Left)    OUTCOME: Patient tolerated treatment/procedure well without complication and is now ready for discharge.    DISPOSITION: Home or Self Care    FINAL DIAGNOSIS: Knee arthritis    FOLLOWUP: In clinic    DISCHARGE INSTRUCTIONS:    Discharge Procedure Orders   Activity as tolerated     Call MD for:  difficulty breathing, headache or visual disturbances     Call MD for:  extreme fatigue     Call MD for:  hives     Call MD for:  persistent dizziness or light-headedness     Call MD for:  persistent nausea and vomiting     Call MD for:  redness, tenderness, or signs of infection (pain, swelling, redness, odor or green/yellow discharge around incision site)     Call MD for:  severe uncontrolled pain     Call MD for:  temperature >100.4     Keep surgical extremity elevated when not ambulating     Leave dressing on - Keep it clean, dry, and intact until clinic visit   Order Comments: Do not remove surgical dressing for 2 weeks post-op. This will be done only by MD at initial post-op visit. If dressing is completely saturated, replace with identical dressing - silver-impregnated hydrocolloid dressing.     Do not get dressings wet. Do not shower.     If dressing continues to be saturated or there are signs of infection, please call [facility] [phone] for further instructions and to make appt to be seen.     Lifting restrictions   Order Comments: No strenuous exercise or lifting of > 10 lbs     No driving, operating heavy equipment or signing legal documents while taking pain medication     On POD1 remove ace wrap and cotton padding. Leave Aquacel bandage on until 2  week post op visit in clinic     Sponge bath only until clinic visit     Weight bearing as tolerated        Clinical Reference Documents Added to Patient Instructions       Document    ANE POST-OP INSTRUCTIONS     SEDATION, PROCEDURAL (ADULT) (ENGLISH)

## 2020-05-06 NOTE — PROGRESS NOTES
5/6/2020 1445    Patient called at home. Reports pain well controlled with On-Q. Patient denies signs of local anesthetic toxicity. PNC dressing remains clean, dry, and intact. All questions answered. Encouraged to call if any issues arise.

## 2020-05-06 NOTE — ANESTHESIA POSTPROCEDURE EVALUATION
Anesthesia Post Evaluation    Patient: Hilario Thomas    Procedure(s) Performed: Procedure(s) (LRB):  ARTHROPLASTY,KNEE,TOTAL,USING COMPUTER-ASSISTED NAVIGATION SAME DAY (Left)    Final Anesthesia Type: spinal    Patient location during evaluation: PACU  Patient participation: Yes- Able to Participate  Level of consciousness: awake and alert  Post-procedure vital signs: reviewed and stable  Pain management: adequate  Airway patency: patent    PONV status at discharge: No PONV  Anesthetic complications: no      Cardiovascular status: blood pressure returned to baseline  Respiratory status: unassisted            Vitals Value Taken Time   /76 5/5/2020  1:45 PM   Temp 36.6 °C (97.8 °F) 5/5/2020  1:45 PM   Pulse 75 5/5/2020  1:47 PM   Resp 16 5/5/2020  1:47 PM   SpO2 92 % 5/5/2020  1:47 PM   Vitals shown include unvalidated device data.      Event Time     Out of Recovery 13:05:00          Pain/Hamzah Score: Pain Rating Prior to Med Admin: 7 (5/5/2020  2:30 PM)  Pain Rating Post Med Admin: 6 (5/5/2020  2:30 PM)  Hamzah Score: 10 (5/5/2020  1:30 PM)

## 2020-05-06 NOTE — ADDENDUM NOTE
Addendum  created 05/06/20 1444 by Blake Quintero RN    Intraprocedure Event edited, Sign clinical note

## 2020-05-07 ENCOUNTER — PATIENT MESSAGE (OUTPATIENT)
Dept: ADMINISTRATIVE | Facility: OTHER | Age: 65
End: 2020-05-07

## 2020-05-07 ENCOUNTER — CLINICAL SUPPORT (OUTPATIENT)
Dept: REHABILITATION | Facility: HOSPITAL | Age: 65
End: 2020-05-07
Payer: COMMERCIAL

## 2020-05-07 DIAGNOSIS — R26.2 DIFFICULTY WALKING: ICD-10-CM

## 2020-05-07 DIAGNOSIS — Z96.652 STATUS POST TOTAL LEFT KNEE REPLACEMENT: ICD-10-CM

## 2020-05-07 DIAGNOSIS — M25.562 CHRONIC PAIN OF LEFT KNEE: ICD-10-CM

## 2020-05-07 DIAGNOSIS — M17.12 PRIMARY OSTEOARTHRITIS OF LEFT KNEE: ICD-10-CM

## 2020-05-07 DIAGNOSIS — G89.29 CHRONIC PAIN OF LEFT KNEE: ICD-10-CM

## 2020-05-07 PROCEDURE — 97162 PT EVAL MOD COMPLEX 30 MIN: CPT | Performed by: PHYSICAL THERAPIST

## 2020-05-07 PROCEDURE — 97110 THERAPEUTIC EXERCISES: CPT | Performed by: PHYSICAL THERAPIST

## 2020-05-07 NOTE — PLAN OF CARE
[facility]  Physical Therapy Initial Evaluation    Date: 5/7/2020   Name: Hilario Thomas  Clinic Number: [id number]    Therapy Diagnosis:   Encounter Diagnoses   Name Primary?    Primary osteoarthritis of left knee     Status post total left knee replacement 5/5/2020     Chronic pain of left knee     Difficulty walking      Physician: Mariel Quintanilla PA-C    Physician Orders: PT Eval and Treat   Medical Diagnosis from Referral:   M17.12 (ICD-10-CM) - Primary osteoarthritis of left knee   Z96.652 (ICD-10-CM) - Status post total left knee replacement     Evaluation Date: 5/7/2020  Authorization Period Expiration: 12/31/2020  Plan of Care Expiration: 8/14/2020  Visit # / Visits authorized: 1/ 20    Time In: 0800  Time Out: 0900  Total Appointment Time (timed & untimed codes): 60 minutes    Precautions: Standard    Subjective   Date of onset: 5/5/2020  History of current condition - Hilario reports: Patient reports he underwent a L TKA on 5/5/2020. He was t [facility] scheduled for a TKA on 3/31 but it was pushed back due to COVID. He received cortisone in both knees every 6 months for the past 3 years with relief, but this time he did not receive the same relief. He remained active the past year going to the [place], beaches, and hiking. He presents using a standard walker but typically does not require assistive device. He slept well the first night but last night was increased pain after walking 4-5 times during the day     Medical History:   Past Medical History:   Diagnosis Date    Abnormal ECG 5/31/2016 5/31/2016: ECG: Inferior Q waves.    Arthritis     Family history of ischemic heart disease 5/31/2016    Mother: Age 55: Myocardial infarction.       Surgical History:   Hilario Thomas  has a past surgical history that includes Knee surgery.    Medications:   Hilario has a current medication list which includes the following prescription(s): acetaminophen,  "ascorbic acid, aspirin, celecoxib, docusate sodium, ergocalciferol (vitamin d2), and oxycodone.    Allergies:   Review of patient's allergies indicates:  No Known Allergies     Imaging, xray:     Prior Therapy: Yes- prehab on knees  Social History: He lives with their family  Occupation: Retired last year  Prior Level of Function: [place]   Current Level of Function: Modified independent with standard walker    Pain:  Current 3/10, worst 6/10, best 2/10   Location: left knee  left knee(s)   Description: Aching  Aggravating Factors: Standing, Bending, Walking, Night Time, Extension, Flexing, Lifting and in and out the car  Easing Factors: pain medication, ice and rest    Pts goals: return to hiking    Objective     Observation: Patient presents to clinic with standard walker, standing in waiting room unable to sit in low chair, pleasant mood throughout treatment, bandage in place over incision and pain pump in place    Functional Tests:  Gait: Presents with standard walker, lacking heel strike, lacking 10 deg extension and limited knee flexion    Knee Passive Range of Motion:   Right  Left    Flexion 109 89   Extension 0 -3     Full extension after mobs and quad sets    Quad Set: Poor activation ~25%. Difficulty performing LAQ against gravity    Joint Mobility: Limited patellar mobility with bandage over incision, Good patellar mobility for post op day #2    Palpation: Tender over medial and lateral joint line    Sensation: Diminished over incision     Edema: Present     Girth Measurement Joint line 15 cm below 10 cm above   Right 43.0 cm 37.2 cm 43.1 cm   Left 46.2 cm 39.6 cm 45.8 cm       TREATMENT   Treatment Time In: 0845  Treatment Time Out: 0855  Total Treatment time (time-based codes) separate from Evaluation: 10 minutes    Hilario received therapeutic exercises to develop strength, endurance and ROM for 10 minutes including:  Heel slides 5" with strap  Hip abduction on board " 30x  SAQ 2 x 10      Hilario received cold pack for 10 minutes to L knee.    Home Exercises and Patient Education Provided    Education provided:   - Importance of achieving full knee extension    Written Home Exercises Provided: yes.  Exercises were reviewed and Hilario was able to demonstrate them prior to the end of the session.  Hilario demonstrated good  understanding of the education provided.     See EMR under Patient Instructions for exercises provided 5/7/2020.    Assessment   Hilario is a 65 y.o. male referred to outpatient Physical Therapy with a medical diagnosis of L TKA on 5/5/2020. Pt presents with limited knee motion, gait impairments requiring a standard walker, poor quad activation, difficulty completing ADLs, trouble sleeping, and unable to participate in recreational activity such as hiking     Pt prognosis is Good.   Pt will benefit from skilled outpatient Physical Therapy to address the deficits stated above and in the chart below, provide pt/family education, and to maximize pt's level of independence.     Plan of care discussed with patient: Yes  Pt's spiritual, cultural and educational needs considered and patient is agreeable to the plan of care and goals as stated below:     Anticipated Barriers for therapy: covid-19 and transportation    Medical Necessity is demonstrated by the following  History  Co-morbidities and personal factors that may impact the plan of care Co-morbidities:   advanced age, high BMI and level of undertstanding of current condition    Personal Factors:   no deficits     moderate   Examination  Body Structures and Functions, activity limitations and participation restrictions that may impact the plan of care Body Regions:   lower extremities    Body Systems:    ROM  strength  balance  gait  transfers  motor control  motor learning  edema  scar formation    Participation Restrictions:   covid and transportaiton    Activity limitations:   Learning and applying  knowledge  no deficits    General Tasks and Commands  no deficits    Communication  no deficits    Mobility  walking    Self care  no deficits    Domestic Life  shopping  cooking  doing house work (cleaning house, washing dishes, laundry)  assisting others    Interactions/Relationships  no deficits    Life Areas  no deficits    Community and Social Life  no deficits         moderate   Clinical Presentation evolving clinical presentation with changing clinical characteristics moderate   Decision Making/ Complexity Score: moderate     GOALS: Short Term Goals:  6 weeks  1.Report decreased knee pain  < /=  2/10  to increase tolerance for sleeping at night and completing ADLs  2. Increase knee ROM to 120 flexion and full extension in order to be able to perform ADLs without difficulty.  3. Increase strength by 1/3 MMT grade in quads and gluts  to increase tolerance for ADL and work activities.  4. Pt to tolerate HEP to improve ROM and independence with ADL's    Long Term Goals: 12 weeks  1.Report decreased knee pain < / = 0/10  to increase tolerance for ambulation 3 miles without assistive device in community  2.Patient goal: return to hiking and ambulating at night without knee pain  3.Increase strength to >/= 4+/5 in quad and gluts  to increase tolerance for ADL and work activities.  4. Pt will perform 20 squats without L knee pain to improve functional mobility and reduce pain with daily tasks like in and out of the car    Plan   Plan of care Certification: 5/7/2020 to 8/14/2020.    Outpatient Physical Therapy 3 times weekly for 10 weeks to include the following interventions: Gait Training, Manual Therapy, Moist Heat/ Ice, Neuromuscular Re-ed, Patient Education, Self Care, Therapeutic Activites and Therapeutic Exercise.     Aristeo Hsieh, PT

## 2020-05-08 NOTE — PROGRESS NOTES
5/8/2020 1532    Called and spoke with patient's wife. Reported patient's On-Q pump removed without difficulty. Stated that blue tip to end of catheter remained intact upon removal. Denies any other concerns at this time.

## 2020-05-09 ENCOUNTER — PATIENT MESSAGE (OUTPATIENT)
Dept: ADMINISTRATIVE | Facility: OTHER | Age: 65
End: 2020-05-09

## 2020-05-11 ENCOUNTER — CLINICAL SUPPORT (OUTPATIENT)
Dept: REHABILITATION | Facility: HOSPITAL | Age: 65
End: 2020-05-11
Payer: COMMERCIAL

## 2020-05-11 DIAGNOSIS — R26.2 DIFFICULTY WALKING: ICD-10-CM

## 2020-05-11 DIAGNOSIS — G89.29 CHRONIC PAIN OF LEFT KNEE: ICD-10-CM

## 2020-05-11 DIAGNOSIS — M25.562 CHRONIC PAIN OF LEFT KNEE: ICD-10-CM

## 2020-05-11 PROCEDURE — 97110 THERAPEUTIC EXERCISES: CPT

## 2020-05-11 PROCEDURE — 97140 MANUAL THERAPY 1/> REGIONS: CPT

## 2020-05-11 NOTE — PROGRESS NOTES
"  Physical Therapy Daily Treatment Note     Name: Hilario Thomas  Clinic Number: [id number]    Therapy Diagnosis:   Encounter Diagnoses   Name Primary?    Chronic pain of left knee     Difficulty walking      Physician: Mariel Quintanilla PA-C    Visit Date: 5/11/2020  Physician Orders: PT Eval and Treat   Medical Diagnosis from Referral:   M17.12 (ICD-10-CM) - Primary osteoarthritis of left knee   Z96.652 (ICD-10-CM) - Status post total left knee replacement      Evaluation Date: 5/7/2020  Authorization Period Expiration: 12/31/2020  Plan of Care Expiration: 8/14/2020  Visit # / Visits authorized: 2/ 20       Time In: 10:07  Time Out: 11:10  Total Billable Time: 55 minutes    Precautions: Standard    Subjective     Pt reports: He does not have any increased pain following the previous session. He is having increased pain in the uninvolved leg which is due to have a TKA in August.  He was compliant with home exercise program.  Response to previous treatment: no increase in pain  Functional change: improved gait    Pain: 3/10  Location: left knee      Objective     Knee Passive Range of Motion:    Right  Left    Flexion 109 93   Extension 0 -2       Hilario received therapeutic exercises to develop strength, endurance and ROM for 45 minutes including:  Heel slides 5" with strap  Hip abduction on board 30x  SAQ 4 x 8  Passive extension with bolster under foot and quad activation - 5 minutes  SAQ to SLR combo - 3x5  Heel raise 3 x 10  Mini squat wihile holding onto plinth    Hilario received the following manual therapy techniques: Joint mobilizations were applied to the: left knee for 10 minutes, including:  Knee extension hinge mobilization  Patellar mobilizations - all planes    Hilario received cold pack for 10 minutes to to decrease circulation, pain, and swelling.      Home Exercises Provided and Patient Education Provided     Education provided:   - - Importance of achieving full knee extension    Written " Home Exercises Provided: Patient instructed to cont prior HEP.  Exercises were reviewed and Hilario was able to demonstrate them prior to the end of the session.  Hilario demonstrated good  understanding of the education provided.     See EMR under Patient Instructions for exercises provided prior visit.    Assessment     Patient displayed slight improvements with knee extension this session. Quadriceps activation is challenging. Patient unable to complete SLR therefore the SAQ combo was utilized. Ambulation is inhibited by right knee dysfunction which requires the patient to use a walker at this time.     Hilario is progressing well towards his goals.   Pt prognosis is Excellent.     Pt will continue to benefit from skilled outpatient physical therapy to address the deficits listed in the problem list box on initial evaluation, provide pt/family education and to maximize pt's level of independence in the home and community environment.     Pt's spiritual, cultural and educational needs considered and pt agreeable to plan of care and goals.    Anticipated Barriers for therapy: covid-19 and transportation    GOALS: Short Term Goals:  6 weeks  1.Report decreased knee pain  < /=  2/10  to increase tolerance for sleeping at night and completing ADLs  2. Increase knee ROM to 120 flexion and full extension in order to be able to perform ADLs without difficulty.  3. Increase strength by 1/3 MMT grade in quads and gluts  to increase tolerance for ADL and work activities.  4. Pt to tolerate HEP to improve ROM and independence with ADL's     Long Term Goals: 12 weeks  1.Report decreased knee pain < / = 0/10  to increase tolerance for ambulation 3 miles without assistive device in community  2.Patient goal: return to hiking and ambulating at night without knee pain  3.Increase strength to >/= 4+/5 in quad and gluts  to increase tolerance for ADL and work activities.  4. Pt will perform 20 squats without L knee pain to improve  functional mobility and reduce pain with daily tasks like in and out of the car    Plan   Plan of care Certification: 5/7/2020 to 8/14/2020.     Outpatient Physical Therapy 3 times weekly for 10 weeks to include the following interventions: Gait Training, Manual Therapy, Moist Heat/ Ice, Neuromuscular Re-ed, Patient Education, Self Care, Therapeutic Activites and Therapeutic Exercise.     Luigi Hope, PT

## 2020-05-13 ENCOUNTER — CLINICAL SUPPORT (OUTPATIENT)
Dept: REHABILITATION | Facility: HOSPITAL | Age: 65
End: 2020-05-13
Payer: COMMERCIAL

## 2020-05-13 DIAGNOSIS — G89.29 CHRONIC PAIN OF LEFT KNEE: ICD-10-CM

## 2020-05-13 DIAGNOSIS — M25.562 CHRONIC PAIN OF LEFT KNEE: ICD-10-CM

## 2020-05-13 DIAGNOSIS — R26.2 DIFFICULTY WALKING: ICD-10-CM

## 2020-05-13 PROCEDURE — 97140 MANUAL THERAPY 1/> REGIONS: CPT

## 2020-05-13 PROCEDURE — 97110 THERAPEUTIC EXERCISES: CPT

## 2020-05-13 PROCEDURE — 97116 GAIT TRAINING THERAPY: CPT

## 2020-05-13 NOTE — PROGRESS NOTES
"  Physical Therapy Daily Treatment Note     Name: Hilario Thomas  Clinic Number: [id number]    Therapy Diagnosis:   Encounter Diagnoses   Name Primary?    Chronic pain of left knee     Difficulty walking      Physician: Mariel Quintanilla PA-C    Visit Date: 5/13/2020  Physician Orders: PT Eval and Treat   Medical Diagnosis from Referral:   M17.12 (ICD-10-CM) - Primary osteoarthritis of left knee   Z96.652 (ICD-10-CM) - Status post total left knee replacement      Evaluation Date: 5/7/2020  Authorization Period Expiration: 12/31/2020  Plan of Care Expiration: 8/14/2020  Visit # / Visits authorized: 2/ 20       Time In: 9:00 a.m.  Time Out: 9:55 a.m.  Total Billable Time: 55 minutes    Precautions: Standard    Subjective     Pt reports: Minimal pain and stiffness. He still uses his walker around the house because he has stairs but he is able to go out and water his garden.    He was compliant with home exercise program.  Response to previous treatment: no increase in pain  Functional change: improved ability to complete ADLs.     Pain: 2/10  Location: left knee      Objective     Knee Passive Range of Motion:    Right  Left    Flexion 109 93   Extension 0 0       Hilario received therapeutic exercises to develop strength, endurance and ROM for 35 minutes including:  Heel slides 5" with strap  Hip abduction on board 30x  SAQ 3 x 10  Passive extension with bolster under foot and quad activation - 5 minutes  Quad set with bolster under heel for extension  SAQ to SLR combo - 3x6  Heel raise 3 x 10  Mini squat wihile holding onto plinth    Hilario received the following manual therapy techniques: Joint mobilizations were applied to the: left knee for 10 minutes, including:  Knee extension hinge mobilization  Patellar mobilizations - all planes    Hilario received gait training with a SPC for 10 minutes focusing on appropriate mechanics, knee flexion, heel strike, and balance.    Hilario received cold pack for 10 " minutes to to decrease circulation, pain, and swelling.      Home Exercises Provided and Patient Education Provided     Education provided:   - - Importance of achieving full knee extension    Written Home Exercises Provided: Patient instructed to cont prior HEP.  Exercises were reviewed and Hilario was able to demonstrate them prior to the end of the session.  Hilario demonstrated good  understanding of the education provided.     See EMR under Patient Instructions for exercises provided prior visit.    Assessment     Patient able to achieve full knee extension passively. Able to ambulate without AD for short distances. He will be transitioning to a SPC at home for balance. Quadriceps activation fair this session and the patient is still unable to complete a SLR.    Hilario is progressing well towards his goals.   Pt prognosis is Excellent.     Pt will continue to benefit from skilled outpatient physical therapy to address the deficits listed in the problem list box on initial evaluation, provide pt/family education and to maximize pt's level of independence in the home and community environment.     Pt's spiritual, cultural and educational needs considered and pt agreeable to plan of care and goals.    Anticipated Barriers for therapy: covid-19 and transportation    GOALS: Short Term Goals:  6 weeks  1.Report decreased knee pain  < /=  2/10  to increase tolerance for sleeping at night and completing ADLs (progressing, not met)  2. Increase knee ROM to 120 flexion and full extension in order to be able to perform ADLs without difficulty. (progressing, not met)  3. Increase strength by 1/3 MMT grade in quads and gluts  to increase tolerance for ADL and work activities. (progressing, not met)  4. Pt to tolerate HEP to improve ROM and independence with ADL's (progressing, not met)     Long Term Goals: 12 weeks  1.Report decreased knee pain < / = 0/10  to increase tolerance for ambulation 3 miles without assistive  device in community (progressing, not met)  2.Patient goal: return to hiking and ambulating at night without knee pain (progressing, not met)  3.Increase strength to >/= 4+/5 in quad and gluts  to increase tolerance for ADL and work activities. (progressing, not met)  4. Pt will perform 20 squats without L knee pain to improve functional mobility and reduce pain with daily tasks like in and out of the car (progressing, not met)    Plan   Plan of care Certification: 5/7/2020 to 8/14/2020.     Outpatient Physical Therapy 3 times weekly for 10 weeks to include the following interventions: Gait Training, Manual Therapy, Moist Heat/ Ice, Neuromuscular Re-ed, Patient Education, Self Care, Therapeutic Activites and Therapeutic Exercise.     Luigi Hope, PT, DPT

## 2020-05-18 ENCOUNTER — CLINICAL SUPPORT (OUTPATIENT)
Dept: REHABILITATION | Facility: HOSPITAL | Age: 65
End: 2020-05-18
Payer: COMMERCIAL

## 2020-05-18 ENCOUNTER — OFFICE VISIT (OUTPATIENT)
Dept: ORTHOPEDICS | Facility: CLINIC | Age: 65
End: 2020-05-18
Payer: COMMERCIAL

## 2020-05-18 VITALS
DIASTOLIC BLOOD PRESSURE: 78 MMHG | HEART RATE: 50 BPM | TEMPERATURE: 97 F | HEIGHT: 74 IN | SYSTOLIC BLOOD PRESSURE: 140 MMHG | BODY MASS INDEX: 28.9 KG/M2 | WEIGHT: 225.19 LBS

## 2020-05-18 DIAGNOSIS — R26.2 DIFFICULTY WALKING: ICD-10-CM

## 2020-05-18 DIAGNOSIS — M25.562 CHRONIC PAIN OF LEFT KNEE: ICD-10-CM

## 2020-05-18 DIAGNOSIS — G89.29 CHRONIC PAIN OF LEFT KNEE: ICD-10-CM

## 2020-05-18 DIAGNOSIS — Z96.652 STATUS POST TOTAL LEFT KNEE REPLACEMENT: ICD-10-CM

## 2020-05-18 PROCEDURE — 99024 POSTOP FOLLOW-UP VISIT: CPT | Mod: S$GLB,,, | Performed by: PHYSICIAN ASSISTANT

## 2020-05-18 PROCEDURE — 97140 MANUAL THERAPY 1/> REGIONS: CPT

## 2020-05-18 PROCEDURE — 97110 THERAPEUTIC EXERCISES: CPT

## 2020-05-18 PROCEDURE — 99999 PR PBB SHADOW E&M-EST. PATIENT-LVL III: CPT | Mod: PBBFAC,,, | Performed by: PHYSICIAN ASSISTANT

## 2020-05-18 PROCEDURE — 99024 PR POST-OP FOLLOW-UP VISIT: ICD-10-PCS | Mod: S$GLB,,, | Performed by: PHYSICIAN ASSISTANT

## 2020-05-18 PROCEDURE — 99999 PR PBB SHADOW E&M-EST. PATIENT-LVL III: ICD-10-PCS | Mod: PBBFAC,,, | Performed by: PHYSICIAN ASSISTANT

## 2020-05-18 RX ORDER — CELECOXIB 200 MG/1
200 CAPSULE ORAL DAILY
Qty: 60 CAPSULE | Refills: 0 | Status: SHIPPED | OUTPATIENT
Start: 2020-05-18 | End: 2020-07-17

## 2020-05-18 NOTE — PROGRESS NOTES
"  Physical Therapy Daily Treatment Note     Name: Hilario Thomas  Clinic Number: [id number]    Therapy Diagnosis:   Encounter Diagnoses   Name Primary?    Chronic pain of left knee     Difficulty walking      Physician: Mariel Quintanilla PA-C    Visit Date: 5/18/2020  Physician Orders: PT Eval and Treat   Medical Diagnosis from Referral:   M17.12 (ICD-10-CM) - Primary osteoarthritis of left knee   Z96.652 (ICD-10-CM) - Status post total left knee replacement      Evaluation Date: 5/7/2020  Authorization Period Expiration: 12/31/2020  Plan of Care Expiration: 8/14/2020  Visit # / Visits authorized: 4/ 20       Time In: 8:23 a.m.  Time Out: 9:13 a.m.  Total Billable Time: 48 minutes    Precautions: Standard    Subjective     Pt reports: He no longer uses a walker and prefers his cane. He has been able to increase his walking and is having minimal pain.    He was compliant with home exercise program.  Response to previous treatment: no increase in pain  Functional change: improved ability to complete ADLs.     Pain: 1/10  Location: left knee      Objective     Knee Passive Range of Motion:    Right  Left    Flexion 109 97   Extension 0 0       Hilario received therapeutic exercises to develop strength, endurance and ROM for 40 minutes including:  Heel slides 5" with strap  Hip abduction on board 30x  Seated HS curl 10 x 10"  Passive extension with bolster under foot and quad activation - 5 minutes  Quad set with bolster under heel for extension 10 x 10"  SLR - 4 x 6  LAQ 3 x 10  Heel raise 3 x 10  Mini squat wihile holding onto plinth 2 x 10  Bridges 10 x 5"  Sidelying hip abduction 3 x 10    Hilario received the following manual therapy techniques: Joint mobilizations were applied to the: left knee for 8 minutes, including:  Knee extension hinge mobilization  Patellar mobilizations - all planes    Hilario received gait training with a SPC for 10 minutes focusing on appropriate mechanics, knee flexion, heel strike, " and balance.    Hilario received cold pack for 10 minutes to to decrease circulation, pain, and swelling.      Home Exercises Provided and Patient Education Provided     Education provided:   - - Importance of achieving full knee extension    Written Home Exercises Provided: Patient instructed to cont prior HEP.  Exercises were reviewed and Hilario was able to demonstrate them prior to the end of the session.  Hilario demonstrated good  understanding of the education provided.     See EMR under Patient Instructions for exercises provided prior visit.    Assessment     Patient is progressing well demonstrating improved ROM, strength, and functional mobility. Slight extension lag noted with SLR. Mild swelling persists which limits some motion, expect ROM improvements as this decreases. He has slight limitations due to pain in the contralateral limb,     Hilario is progressing well towards his goals.   Pt prognosis is Excellent.     Pt will continue to benefit from skilled outpatient physical therapy to address the deficits listed in the problem list box on initial evaluation, provide pt/family education and to maximize pt's level of independence in the home and community environment.     Pt's spiritual, cultural and educational needs considered and pt agreeable to plan of care and goals.    Anticipated Barriers for therapy: covid-19 and transportation    GOALS: Short Term Goals:  6 weeks  1.Report decreased knee pain  < /=  2/10  to increase tolerance for sleeping at night and completing ADLs (progressing, not met)  2. Increase knee ROM to 120 flexion and full extension in order to be able to perform ADLs without difficulty. (progressing, not met)  3. Increase strength by 1/3 MMT grade in quads and gluts  to increase tolerance for ADL and work activities. (progressing, not met)  4. Pt to tolerate HEP to improve ROM and independence with ADL's (progressing, not met)     Long Term Goals: 12 weeks  1.Report decreased  knee pain < / = 0/10  to increase tolerance for ambulation 3 miles without assistive device in community (progressing, not met)  2.Patient goal: return to hiking and ambulating at night without knee pain (progressing, not met)  3.Increase strength to >/= 4+/5 in quad and gluts  to increase tolerance for ADL and work activities. (progressing, not met)  4. Pt will perform 20 squats without L knee pain to improve functional mobility and reduce pain with daily tasks like in and out of the car (progressing, not met)    Plan   Plan of care Certification: 5/7/2020 to 8/14/2020.     Outpatient Physical Therapy 2 to 3 times weekly for 10 weeks to include the following interventions: Gait Training, Manual Therapy, Moist Heat/ Ice, Neuromuscular Re-ed, Patient Education, Self Care, Therapeutic Activites and Therapeutic Exercise.     Luigi Hope, PT, DPT

## 2020-05-18 NOTE — PROGRESS NOTES
"Hilario Thomas presents for initial post-operative visit following a right FILOMENA total knee arthroplasty performed by Dr. Centeno on 5/5/2020. He is doing very well. He is off of pain medication.  Doing well in PT.    Exam:   Blood pressure (!) 140/78, pulse (!) 50, temperature 97 °F (36.1 °C), temperature source Oral, height 6' 2" (1.88 m), weight 102.2 kg (225 lb 3.2 oz).   Ambulating well with assistive device.  Incision is clean and dry without drainage or erythema.   ROM: 2-100    Initial post-operative radiographs reviewed today revealing a well fixed and aligned prosthesis.    A/P:  2 weeks s/p right FILOMENA total knee replacement    - The patient was advised to keep the incision clean and dry for the next 24 hours after which he may wash the area with antibacterial soap in the shower. Will not submerge until the incision is completely healed.   - Outpatient PT ongoing in [facility]   - Continue ASA for 1 month from surgery.  - Reviewed antibiotic prophylaxis   - Follow up in 4 weeks with new x-rays. Pt will call clinic with problems/concerns.     "

## 2020-05-20 ENCOUNTER — CLINICAL SUPPORT (OUTPATIENT)
Dept: REHABILITATION | Facility: HOSPITAL | Age: 65
End: 2020-05-20
Payer: COMMERCIAL

## 2020-05-20 DIAGNOSIS — M25.562 CHRONIC PAIN OF LEFT KNEE: ICD-10-CM

## 2020-05-20 DIAGNOSIS — R26.2 DIFFICULTY WALKING: ICD-10-CM

## 2020-05-20 DIAGNOSIS — G89.29 CHRONIC PAIN OF LEFT KNEE: ICD-10-CM

## 2020-05-20 PROCEDURE — 97140 MANUAL THERAPY 1/> REGIONS: CPT

## 2020-05-20 PROCEDURE — 97110 THERAPEUTIC EXERCISES: CPT

## 2020-05-20 NOTE — PROGRESS NOTES
"  Physical Therapy Daily Treatment Note     Name: Hilraio Thomas  Clinic Number: [id number]    Therapy Diagnosis:   Encounter Diagnoses   Name Primary?    Chronic pain of left knee     Difficulty walking      Physician: Mariel Quintanilla PA-C    Visit Date: 5/20/2020  Physician Orders: PT Eval and Treat   Medical Diagnosis from Referral:   M17.12 (ICD-10-CM) - Primary osteoarthritis of left knee   Z96.652 (ICD-10-CM) - Status post total left knee replacement      Evaluation Date: 5/7/2020  Authorization Period Expiration: 12/31/2020  Plan of Care Expiration: 8/14/2020  Visit # / Visits authorized: 5/ 20       Time In: 8:23 a.m.  Time Out: 9:20 a.m.  Total Billable Time: 55 minutes    Precautions: Standard    Subjective     Pt reports: Continues to increase the amount of walking he is doing at home and pain is controlled.     He was compliant with home exercise program.  Response to previous treatment: no increase in pain  Functional change: improved ability to complete ADLs.     Pain: 1/10  Location: left knee      Objective     Knee Passive Range of Motion:    Right  Left    Flexion 109 101   Extension 0 0       Hilario received therapeutic exercises to develop strength, endurance and ROM for 47 minutes including:  Heel slides 5" with strap  Hip abduction on board 30x  Seated HS curl 10 x 10"  Passive extension with bolster under foot 5 lbs - 5 minutes  Quad set with bolster under heel for extension 10 x 10"  SLR - 4 x 8  LAQ 3 x 10  Heel raise 3 x 10  Mini squat wihile holding onto plinth 2 x 10  Bridges 10 x 5"  Sidelying hip abduction 3 x 10  Recumbent cycle to improve active knee flexion and extension for 10 minutes       Hilario received the following manual therapy techniques: Joint mobilizations were applied to the: left knee for 8 minutes, including:  Knee extension hinge mobilization  Patellar mobilizations - all planes      Home Exercises Provided and Patient Education Provided     Education provided: "   - - Importance of achieving full knee extension    Written Home Exercises Provided: Patient instructed to cont prior HEP.  Exercises were reviewed and Hilario was able to demonstrate them prior to the end of the session.  Hilario demonstrated good  understanding of the education provided.     See EMR under Patient Instructions for exercises provided prior visit.    Assessment     Patient continues to progress very well with treatment demonstrating improved motion, functional mobility, and quadriceps strength. Able to progress to 10 minutes on the recumbent cycle without issue.    Hilario is progressing well towards his goals.   Pt prognosis is Excellent.     Pt will continue to benefit from skilled outpatient physical therapy to address the deficits listed in the problem list box on initial evaluation, provide pt/family education and to maximize pt's level of independence in the home and community environment.     Pt's spiritual, cultural and educational needs considered and pt agreeable to plan of care and goals.    Anticipated Barriers for therapy: covid-19 and transportation    GOALS: Short Term Goals:  6 weeks  1.Report decreased knee pain  < /=  2/10  to increase tolerance for sleeping at night and completing ADLs (progressing, not met)  2. Increase knee ROM to 120 flexion and full extension in order to be able to perform ADLs without difficulty. (progressing, not met)  3. Increase strength by 1/3 MMT grade in quads and gluts  to increase tolerance for ADL and work activities. (progressing, not met)  4. Pt to tolerate HEP to improve ROM and independence with ADL's (progressing, not met)     Long Term Goals: 12 weeks  1.Report decreased knee pain < / = 0/10  to increase tolerance for ambulation 3 miles without assistive device in community (progressing, not met)  2.Patient goal: return to hiking and ambulating at night without knee pain (progressing, not met)  3.Increase strength to >/= 4+/5 in quad and gluts   to increase tolerance for ADL and work activities. (progressing, not met)  4. Pt will perform 20 squats without L knee pain to improve functional mobility and reduce pain with daily tasks like in and out of the car (progressing, not met)    Plan   Plan of care Certification: 5/7/2020 to 8/14/2020.     Outpatient Physical Therapy 2 to 3 times weekly for 10 weeks to include the following interventions: Gait Training, Manual Therapy, Moist Heat/ Ice, Neuromuscular Re-ed, Patient Education, Self Care, Therapeutic Activites and Therapeutic Exercise.     Luigi Hope, PT, DPT

## 2020-05-26 ENCOUNTER — CLINICAL SUPPORT (OUTPATIENT)
Dept: REHABILITATION | Facility: HOSPITAL | Age: 65
End: 2020-05-26
Payer: COMMERCIAL

## 2020-05-26 DIAGNOSIS — M25.562 CHRONIC PAIN OF LEFT KNEE: ICD-10-CM

## 2020-05-26 DIAGNOSIS — G89.29 CHRONIC PAIN OF LEFT KNEE: ICD-10-CM

## 2020-05-26 DIAGNOSIS — R26.2 DIFFICULTY WALKING: ICD-10-CM

## 2020-05-26 PROCEDURE — 97530 THERAPEUTIC ACTIVITIES: CPT

## 2020-05-26 PROCEDURE — 97140 MANUAL THERAPY 1/> REGIONS: CPT

## 2020-05-26 PROCEDURE — 97110 THERAPEUTIC EXERCISES: CPT

## 2020-05-26 NOTE — PROGRESS NOTES
"  Physical Therapy Daily Treatment Note     Name: Hilario Thomas  Clinic Number: [id number]    Therapy Diagnosis:   Encounter Diagnoses   Name Primary?    Chronic pain of left knee     Difficulty walking      Physician: Mariel Quintanilla PA-C    Visit Date: 5/26/2020  Physician Orders: PT Eval and Treat   Medical Diagnosis from Referral:   M17.12 (ICD-10-CM) - Primary osteoarthritis of left knee   Z96.652 (ICD-10-CM) - Status post total left knee replacement      Evaluation Date: 5/7/2020  Authorization Period Expiration: 12/31/2020  Plan of Care Expiration: 8/14/2020  Visit # / Visits authorized: 6/ 20       Time In: 8:50 a.m.  Time Out: 9:50 a.m.  Total Billable Time: 58 minutes    Precautions: Standard    Subjective     Pt reports: No longer requires a SPC for ambulation. Pain is controlled. He is having some difficulty with stairs which concerns him because he has a camp that he goes to in the summer which is on the second floor.     He was compliant with home exercise program.  Response to previous treatment: no increase in pain  Functional change: improved ability to complete ADLs.     Pain: 1/10  Location: left knee      Objective     Knee Passive Range of Motion:    Right  Left    Flexion 109 110   Extension 0 0       Hilario received therapeutic exercises to develop strength, endurance and ROM for 40 minutes including:  Recumbent cycle to improve active knee flexion and extension for 10 minutes   Heel slides 5" with strap  Hip abduction in sidelying 3 x 10  Seated HS curl 10 x 10"  Passive extension with bolster under foot 5 lbs - 5 minutes  Quad set with bolster under heel for extension 10 x 10"  SLR - 4 x 8 -  2 lbs  LAQ 3 x 10  Heel raise 3 x 10  Mini squat wihile holding onto plinth 2 x 10  4 way Bridges 10 x 10", ABD, ADD, No march      Hilario received Therapeutic activities which included stair training for 10 minutes to improve functional mobility and reduce personal anxiety.       Hilario " received the following manual therapy techniques: Joint mobilizations were applied to the: left knee for 8 minutes, including:  Knee extension hinge mobilization  Patellar mobilizations - all planes, inferior from 70 degrees of knee flexion      Home Exercises Provided and Patient Education Provided     Education provided:   - - Importance of achieving full knee extension    Written Home Exercises Provided: Patient instructed to cont prior HEP.  Exercises were reviewed and Hilario was able to demonstrate them prior to the end of the session.  Hilario demonstrated good  understanding of the education provided.     See EMR under Patient Instructions for exercises provided prior visit.    Assessment     Patient continues to progress very well. Gluteal weakness and right knee dysfunction manifests in gait deviations. Patient able to progress into functional strengthening.     Hilario is progressing well towards his goals.   Pt prognosis is Excellent.     Pt will continue to benefit from skilled outpatient physical therapy to address the deficits listed in the problem list box on initial evaluation, provide pt/family education and to maximize pt's level of independence in the home and community environment.     Pt's spiritual, cultural and educational needs considered and pt agreeable to plan of care and goals.    Anticipated Barriers for therapy: covid-19 and transportation    GOALS: Short Term Goals:  6 weeks  1.Report decreased knee pain  < /=  2/10  to increase tolerance for sleeping at night and completing ADLs (progressing, not met)  2. Increase knee ROM to 120 flexion and full extension in order to be able to perform ADLs without difficulty. (progressing, not met)  3. Increase strength by 1/3 MMT grade in quads and gluts  to increase tolerance for ADL and work activities. (progressing, not met)  4. Pt to tolerate HEP to improve ROM and independence with ADL's (progressing, not met)     Long Term Goals: 12  weeks  1.Report decreased knee pain < / = 0/10  to increase tolerance for ambulation 3 miles without assistive device in community (progressing, not met)  2.Patient goal: return to hiking and ambulating at night without knee pain (progressing, not met)  3.Increase strength to >/= 4+/5 in quad and gluts  to increase tolerance for ADL and work activities. (progressing, not met)  4. Pt will perform 20 squats without L knee pain to improve functional mobility and reduce pain with daily tasks like in and out of the car (progressing, not met)    Plan   Plan of care Certification: 5/7/2020 to 8/14/2020.     Outpatient Physical Therapy 2 to 3 times weekly for 10 weeks to include the following interventions: Gait Training, Manual Therapy, Moist Heat/ Ice, Neuromuscular Re-ed, Patient Education, Self Care, Therapeutic Activites and Therapeutic Exercise.     Luigi Hope, PT, DPT

## 2020-05-29 ENCOUNTER — CLINICAL SUPPORT (OUTPATIENT)
Dept: REHABILITATION | Facility: HOSPITAL | Age: 65
End: 2020-05-29
Payer: COMMERCIAL

## 2020-05-29 DIAGNOSIS — M25.562 CHRONIC PAIN OF LEFT KNEE: ICD-10-CM

## 2020-05-29 DIAGNOSIS — R26.2 DIFFICULTY WALKING: ICD-10-CM

## 2020-05-29 DIAGNOSIS — G89.29 CHRONIC PAIN OF LEFT KNEE: ICD-10-CM

## 2020-05-29 PROCEDURE — 97110 THERAPEUTIC EXERCISES: CPT

## 2020-05-29 PROCEDURE — 97530 THERAPEUTIC ACTIVITIES: CPT

## 2020-05-29 PROCEDURE — 97140 MANUAL THERAPY 1/> REGIONS: CPT

## 2020-05-29 NOTE — PROGRESS NOTES
"  Physical Therapy Daily Treatment Note     Name: Hilario Thomas  Clinic Number: [id number]    Therapy Diagnosis:   Encounter Diagnoses   Name Primary?    Chronic pain of left knee     Difficulty walking      Physician: Mariel Quintanilla PA-C    Visit Date: 5/29/2020  Physician Orders: PT Eval and Treat   Medical Diagnosis from Referral:   M17.12 (ICD-10-CM) - Primary osteoarthritis of left knee   Z96.652 (ICD-10-CM) - Status post total left knee replacement      Evaluation Date: 5/7/2020  Authorization Period Expiration: 12/31/2020  Plan of Care Expiration: 8/14/2020  Visit # / Visits authorized: 6/ 20       Time In: 8:30 a.m.  Time Out: 9:120 a.m.  Total Billable Time: 48 minutes    Precautions: Standard    Subjective     Pt reports: Improved mobility and decreased pain.    He was compliant with home exercise program.  Response to previous treatment: no increase in pain  Functional change: improved ability to complete ADLs.     Pain: 1/10  Location: left knee      Objective     Knee Passive Range of Motion:    Right  Left    Flexion 109 110   Extension 0 0       Hilario received therapeutic exercises to develop strength, endurance and ROM for 25 minutes including:  Recumbent cycle to improve active knee flexion and extension for 10 minutes   Quad set with bolster under heel for extension 10 x 10"  SLR - 4 x 8 -  2 lbs  LAQ 3 x 10  Heel raise 3 x 10  4 way Bridges 10 x 10", ABD, ADD, No march      Hilario received Therapeutic activities for 15 minutes to improve functional mobility and reduce personal anxiety including the following:  Stair training with step up and down, lateral  Sit to stand box squats  Leg press - 60 lbs 4 x 10 to improve functional squatting.        Hilario received the following manual therapy techniques: Joint mobilizations were applied to the: left knee for 8 minutes, including:  Knee extension hinge mobilization  Patellar mobilizations - all planes, inferior from 70 degrees of knee " flexion      Home Exercises Provided and Patient Education Provided     Education provided:   - - Importance of achieving full knee extension    Written Home Exercises Provided: Patient instructed to cont prior HEP.  Exercises were reviewed and Hilario was able to demonstrate them prior to the end of the session.  Hilario demonstrated good  understanding of the education provided.     See EMR under Patient Instructions for exercises provided prior visit.    Assessment     Improvements in ROM and functional mobility. Patient is progressing very well. Ready to progress with single leg exercises and balance training.    Hilario is progressing well towards his goals.   Pt prognosis is Excellent.     Pt will continue to benefit from skilled outpatient physical therapy to address the deficits listed in the problem list box on initial evaluation, provide pt/family education and to maximize pt's level of independence in the home and community environment.     Pt's spiritual, cultural and educational needs considered and pt agreeable to plan of care and goals.    Anticipated Barriers for therapy: covid-19 and transportation    GOALS: Short Term Goals:  6 weeks  1.Report decreased knee pain  < /=  2/10  to increase tolerance for sleeping at night and completing ADLs (progressing, not met)  2. Increase knee ROM to 120 flexion and full extension in order to be able to perform ADLs without difficulty. (progressing, not met)  3. Increase strength by 1/3 MMT grade in quads and gluts  to increase tolerance for ADL and work activities. (progressing, not met)  4. Pt to tolerate HEP to improve ROM and independence with ADL's (progressing, not met)     Long Term Goals: 12 weeks  1.Report decreased knee pain < / = 0/10  to increase tolerance for ambulation 3 miles without assistive device in community (progressing, not met)  2.Patient goal: return to hiking and ambulating at night without knee pain (progressing, not met)  3.Increase  strength to >/= 4+/5 in quad and gluts  to increase tolerance for ADL and work activities. (progressing, not met)  4. Pt will perform 20 squats without L knee pain to improve functional mobility and reduce pain with daily tasks like in and out of the car (progressing, not met)    Plan   Plan of care Certification: 5/7/2020 to 8/14/2020.  Progress single leg exercises and balance.     Outpatient Physical Therapy 2 to 3 times weekly for 10 weeks to include the following interventions: Gait Training, Manual Therapy, Moist Heat/ Ice, Neuromuscular Re-ed, Patient Education, Self Care, Therapeutic Activites and Therapeutic Exercise.     Luigi Hope, PT, DPT

## 2020-06-02 ENCOUNTER — CLINICAL SUPPORT (OUTPATIENT)
Dept: REHABILITATION | Facility: HOSPITAL | Age: 65
End: 2020-06-02
Payer: COMMERCIAL

## 2020-06-02 DIAGNOSIS — M25.562 CHRONIC PAIN OF LEFT KNEE: ICD-10-CM

## 2020-06-02 DIAGNOSIS — G89.29 CHRONIC PAIN OF LEFT KNEE: ICD-10-CM

## 2020-06-02 DIAGNOSIS — R26.2 DIFFICULTY WALKING: ICD-10-CM

## 2020-06-02 PROCEDURE — 97110 THERAPEUTIC EXERCISES: CPT

## 2020-06-02 PROCEDURE — 97140 MANUAL THERAPY 1/> REGIONS: CPT

## 2020-06-02 PROCEDURE — 97112 NEUROMUSCULAR REEDUCATION: CPT

## 2020-06-02 NOTE — PROGRESS NOTES
"  Physical Therapy Daily Treatment Note     Name: Hilario Thomas  Clinic Number: [id number]    Therapy Diagnosis:   Encounter Diagnoses   Name Primary?    Chronic pain of left knee     Difficulty walking      Physician: Mariel Quintanilla PA-C    Visit Date: 6/2/2020  Physician Orders: PT Eval and Treat   Medical Diagnosis from Referral:   M17.12 (ICD-10-CM) - Primary osteoarthritis of left knee   Z96.652 (ICD-10-CM) - Status post total left knee replacement      Evaluation Date: 5/7/2020  Authorization Period Expiration: 12/31/2020  Plan of Care Expiration: 8/14/2020  Visit # / Visits authorized: 8/ 20       Time In: 8:50 a.m.  Time Out: 9:50 a.m.  Total Billable Time: 55 minutes    Precautions: Standard    Subjective     Pt reports: Most discomfort is located in the right knee. Slightly increased stiffness due to sitting in car for multiple hours this weekend.     He was compliant with home exercise program.  Response to previous treatment: no increase in pain  Functional change: improved ability to complete ADLs.     Pain: 1/10  Location: left knee      Objective     Knee Range of Motion:    Right  Left    Flexion 109 110/115   Extension 0 0       Hilario received therapeutic exercises to develop strength, endurance and ROM for 30 minutes including:  Recumbent cycle to improve active knee flexion and extension for 10 minutes   Quad set with bolster under heel for extension 10 x 10"  SLR - 4 x 8 -  2 lbs  LAQ 3 x 10  Heel raise 3 x 10  4 way Bridges 10 x 10", ABD, ADD, No march  Hamstring curl 10 x 10"  Prone quadriceps stretch 3'    Hilario received Neuromusclar Re-education for 15 minutes to improve hip control with functional mobility including the following:  Lateral walking with RTB to focus on hip and knee positioning  Leg press - 60 lbs 4 x 12 to improve functional squatting.      Hilario received the following manual therapy techniques: Joint mobilizations were applied to the: left knee for 10 minutes, " including:  Posterior glides with slight destraction from 90 degrees of knee flexion  Patellar mobilizations - all planes, inferior from 70 degrees of knee flexion      Home Exercises Provided and Patient Education Provided     Education provided:   - - Importance of achieving full knee extension    Written Home Exercises Provided: Patient instructed to cont prior HEP.  Exercises were reviewed and Hilario was able to demonstrate them prior to the end of the session.  Hilario demonstrated good  understanding of the education provided.     See EMR under Patient Instructions for exercises provided prior visit.    Assessment     Patient continues to progress with functional strengthening. Most limited by right knee dysfunction and poor hip strength, through some quadriceps weakness persists. Can progress with knee flexion.     Hilario is progressing well towards his goals.   Pt prognosis is Excellent.     Pt will continue to benefit from skilled outpatient physical therapy to address the deficits listed in the problem list box on initial evaluation, provide pt/family education and to maximize pt's level of independence in the home and community environment.     Pt's spiritual, cultural and educational needs considered and pt agreeable to plan of care and goals.    Anticipated Barriers for therapy: covid-19 and transportation    GOALS: Short Term Goals:  6 weeks  1.Report decreased knee pain  < /=  2/10  to increase tolerance for sleeping at night and completing ADLs (met)  2. Increase knee ROM to 120 flexion and full extension in order to be able to perform ADLs without difficulty. (progressing, not met)  3. Increase strength by 1/3 MMT grade in quads and gluts  to increase tolerance for ADL and work activities. (progressing, not met)  4. Pt to tolerate HEP to improve ROM and independence with ADL's (progressing, not met)     Long Term Goals: 12 weeks  1.Report decreased knee pain < / = 0/10  to increase tolerance for  ambulation 3 miles without assistive device in community (progressing, not met)  2.Patient goal: return to hiking and ambulating at night without knee pain (progressing, not met)  3.Increase strength to >/= 4+/5 in quad and gluts  to increase tolerance for ADL and work activities. (progressing, not met)  4. Pt will perform 20 squats without L knee pain to improve functional mobility and reduce pain with daily tasks like in and out of the car (progressing, not met)    Plan   Plan of care Certification: 5/7/2020 to 8/14/2020.  Progress single leg exercises and balance.     Outpatient Physical Therapy 2 to 3 times weekly for 10 weeks to include the following interventions: Gait Training, Manual Therapy, Moist Heat/ Ice, Neuromuscular Re-ed, Patient Education, Self Care, Therapeutic Activites and Therapeutic Exercise.     Luigi Hope, PT, DPT

## 2020-06-05 ENCOUNTER — CLINICAL SUPPORT (OUTPATIENT)
Dept: REHABILITATION | Facility: HOSPITAL | Age: 65
End: 2020-06-05
Payer: COMMERCIAL

## 2020-06-05 DIAGNOSIS — G89.29 CHRONIC PAIN OF LEFT KNEE: ICD-10-CM

## 2020-06-05 DIAGNOSIS — R26.2 DIFFICULTY WALKING: ICD-10-CM

## 2020-06-05 DIAGNOSIS — M25.562 CHRONIC PAIN OF LEFT KNEE: ICD-10-CM

## 2020-06-05 PROCEDURE — 97110 THERAPEUTIC EXERCISES: CPT

## 2020-06-05 PROCEDURE — 97112 NEUROMUSCULAR REEDUCATION: CPT

## 2020-06-05 NOTE — PROGRESS NOTES
"  Physical Therapy Daily Treatment Note     Name: Hilario Thomas  Clinic Number: [id number]    Therapy Diagnosis:   Encounter Diagnoses   Name Primary?    Chronic pain of left knee     Difficulty walking      Physician: Mariel Quintanilla PA-C    Visit Date: 6/5/2020  Physician Orders: PT Eval and Treat   Medical Diagnosis from Referral:   M17.12 (ICD-10-CM) - Primary osteoarthritis of left knee   Z96.652 (ICD-10-CM) - Status post total left knee replacement      Evaluation Date: 5/7/2020  Authorization Period Expiration: 12/31/2020  Plan of Care Expiration: 8/14/2020  Visit # / Visits authorized: 8/ 20       Time In: 8:27 a.m.  Time Out: 9: 20 a.m.  Total Billable Time: 55 minutes    Precautions: Standard    Subjective     Pt reports: Left knee pain is minimal. Right knee continues to be the primary pain generator.     He was compliant with home exercise program.  Response to previous treatment: no increase in pain  Functional change: improved ability to complete ADLs.     Pain: 1/10  Location: left knee      Objective     Knee Range of Motion:    Right  Left    Flexion 109 110/115   Extension 0 0       Hilario received therapeutic exercises to develop strength, endurance and ROM for 25 minutes including:  Recumbent cycle to improve active knee flexion and extension for 10 minutes, lvl 4  Quad set with bolster under heel for extension 10 x 10"  SLR - 4 x 8 -  3 lbs  LAQ 3 x 10  Heel raise 3 x 10 w/ 5" hold  4 way Bridges 10 x 10", ABD, ADD, No march  Hamstring curl 10 x 10" with manual assistance to achieve end range flexion    Hilario received Neuromusclar Re-education for 25 minutes to improve hip control with functional mobility including the following:  Lateral walking with RTB to focus on hip and knee positioning  Leg press - 80 lbs 4 x 8 to improve functional squatting.  Step ups and lateral step ups with 6" step, cues for hip and knee control  Single leg balance 10 x 5"      Hilario received the following " manual therapy techniques: Joint mobilizations were applied to the: left knee for 5 minutes, including:  Posterior glides with slight destraction from 90 degrees of knee flexion  Patellar mobilizations - all planes, inferior from 70 degrees of knee flexion      Home Exercises Provided and Patient Education Provided     Education provided:   - - Importance of achieving full knee extension  -Updated HEP to include single leg balance    Written Home Exercises Provided: Patient instructed to cont prior HEP.  Exercises were reviewed and Hilario was able to demonstrate them prior to the end of the session.  Hilario demonstrated good  understanding of the education provided.     See EMR under Patient Instructions for exercises provided prior visit.    Assessment     No extension lag with SLR. Continues to progress very well with treatment. Hip control and single leg balance is primary limiting factor.     Hilario is progressing well towards his goals.   Pt prognosis is Excellent.     Pt will continue to benefit from skilled outpatient physical therapy to address the deficits listed in the problem list box on initial evaluation, provide pt/family education and to maximize pt's level of independence in the home and community environment.     Pt's spiritual, cultural and educational needs considered and pt agreeable to plan of care and goals.    Anticipated Barriers for therapy: covid-19 and transportation    GOALS: Short Term Goals:  6 weeks  1.Report decreased knee pain  < /=  2/10  to increase tolerance for sleeping at night and completing ADLs (met)  2. Increase knee ROM to 120 flexion and full extension in order to be able to perform ADLs without difficulty. (progressing, not met)  3. Increase strength by 1/3 MMT grade in quads and gluts  to increase tolerance for ADL and work activities. (progressing, not met)  4. Pt to tolerate HEP to improve ROM and independence with ADL's (progressing, not met)     Long Term Goals:  12 weeks  1.Report decreased knee pain < / = 0/10  to increase tolerance for ambulation 3 miles without assistive device in community (progressing, not met)  2.Patient goal: return to hiking and ambulating at night without knee pain (progressing, not met)  3.Increase strength to >/= 4+/5 in quad and gluts  to increase tolerance for ADL and work activities. (progressing, not met)  4. Pt will perform 20 squats without L knee pain to improve functional mobility and reduce pain with daily tasks like in and out of the car (progressing, not met)    Plan   Plan of care Certification: 5/7/2020 to 8/14/2020.  Progress single leg exercises and balance.     Outpatient Physical Therapy 2 to 3 times weekly for 10 weeks to include the following interventions: Gait Training, Manual Therapy, Moist Heat/ Ice, Neuromuscular Re-ed, Patient Education, Self Care, Therapeutic Activites and Therapeutic Exercise.     Luigi Hope, PT, DPT

## 2020-06-09 ENCOUNTER — CLINICAL SUPPORT (OUTPATIENT)
Dept: REHABILITATION | Facility: HOSPITAL | Age: 65
End: 2020-06-09
Payer: COMMERCIAL

## 2020-06-09 DIAGNOSIS — G89.29 CHRONIC PAIN OF LEFT KNEE: ICD-10-CM

## 2020-06-09 DIAGNOSIS — M25.562 CHRONIC PAIN OF LEFT KNEE: ICD-10-CM

## 2020-06-09 DIAGNOSIS — R26.2 DIFFICULTY WALKING: ICD-10-CM

## 2020-06-09 PROCEDURE — 97112 NEUROMUSCULAR REEDUCATION: CPT

## 2020-06-09 PROCEDURE — 97110 THERAPEUTIC EXERCISES: CPT

## 2020-06-09 NOTE — PROGRESS NOTES
".    Physical Therapy Daily Treatment Note     Name: Hilario Thomas  Clinic Number: [id number]    Therapy Diagnosis:   Encounter Diagnoses   Name Primary?    Chronic pain of left knee     Difficulty walking      Physician: Mariel Quintanilla PA-C    Visit Date: 6/9/2020  Physician Orders: PT Eval and Treat   Medical Diagnosis from Referral:   M17.12 (ICD-10-CM) - Primary osteoarthritis of left knee   Z96.652 (ICD-10-CM) - Status post total left knee replacement      Evaluation Date: 5/7/2020  Authorization Period Expiration: 12/31/2020  Plan of Care Expiration: 8/14/2020  Visit # / Visits authorized: 8/ 20       Time In: 2:40 a.m.  Time Out: 3:30 a.m.  Total Billable Time: 45 minutes    Precautions: Standard    Subjective     Pt reports: No pain    He was compliant with home exercise program.  Response to previous treatment: no increase in pain  Functional change: improved ability to complete ADLs.     Pain: 0/10  Location: left knee      Objective     Knee Range of Motion:    Right  Left    Flexion 109 110/115   Extension 0 0       Hilario received therapeutic exercises to develop strength, endurance and ROM for 25 minutes including:  Recumbent cycle to improve active knee flexion and extension for 10 minutes, lvl 4  Quad set with bolster under heel for extension 10 x 10"  SLR - 4 x 8 -  3 lbs  LAQ 3 x 10  Heel raise 3 x 10 w/ 5" hold  4 way Bridges 10 x 10", ABD, ADD, No march  Hamstring curl 10 x 10" with manual assistance to achieve end range flexion    Hilario received Neuromusclar Re-education for 15 minutes to improve hip control with functional mobility including the following:  Lateral walking with RTB to focus on hip and knee positioning  Leg press - 80 lbs 4 x 8 to improve functional squatting.  Step ups and lateral step ups with 6" step, cues for hip and knee control  Single leg balance 10 x 5"      Hilario received the following manual therapy techniques: Joint mobilizations were applied to the: left " knee for 5 minutes, including:  Posterior glides with slight destraction from 90 degrees of knee flexion  Patellar mobilizations - all planes, inferior from 70 degrees of knee flexion      Home Exercises Provided and Patient Education Provided     Education provided:   - - Importance of achieving full knee extension  -Updated HEP to include single leg balance    Written Home Exercises Provided: Patient instructed to cont prior HEP.  Exercises were reviewed and Hilario was able to demonstrate them prior to the end of the session.  Hilario demonstrated good  understanding of the education provided.     See EMR under Patient Instructions for exercises provided prior visit.    Assessment     Progressing treatment to improve hip control and single leg balance. Functional strengthening continues to progress very well.     Hilario is progressing well towards his goals.   Pt prognosis is Excellent.     Pt will continue to benefit from skilled outpatient physical therapy to address the deficits listed in the problem list box on initial evaluation, provide pt/family education and to maximize pt's level of independence in the home and community environment.     Pt's spiritual, cultural and educational needs considered and pt agreeable to plan of care and goals.    Anticipated Barriers for therapy: covid-19 and transportation    GOALS: Short Term Goals:  6 weeks  1.Report decreased knee pain  < /=  2/10  to increase tolerance for sleeping at night and completing ADLs (met)  2. Increase knee ROM to 120 flexion and full extension in order to be able to perform ADLs without difficulty. (progressing, not met)  3. Increase strength by 1/3 MMT grade in quads and gluts  to increase tolerance for ADL and work activities. (progressing, not met)  4. Pt to tolerate HEP to improve ROM and independence with ADL's (progressing, not met)     Long Term Goals: 12 weeks  1.Report decreased knee pain < / = 0/10  to increase tolerance for  ambulation 3 miles without assistive device in community (progressing, not met)  2.Patient goal: return to hiking and ambulating at night without knee pain (progressing, not met)  3.Increase strength to >/= 4+/5 in quad and gluts  to increase tolerance for ADL and work activities. (progressing, not met)  4. Pt will perform 20 squats without L knee pain to improve functional mobility and reduce pain with daily tasks like in and out of the car (progressing, not met)    Plan   Plan of care Certification: 5/7/2020 to 8/14/2020.  Progress single leg exercises and balance.     Outpatient Physical Therapy 2 to 3 times weekly for 10 weeks to include the following interventions: Gait Training, Manual Therapy, Moist Heat/ Ice, Neuromuscular Re-ed, Patient Education, Self Care, Therapeutic Activites and Therapeutic Exercise.     Luigi Hope, PT, DPT

## 2020-06-11 ENCOUNTER — PATIENT OUTREACH (OUTPATIENT)
Dept: ADMINISTRATIVE | Facility: OTHER | Age: 65
End: 2020-06-11

## 2020-06-11 NOTE — PROGRESS NOTES
Chart reviewed.   Immunizations: updated  Orders placed: n/a  Upcoming appts to satisfy [facility]AMIDA topics: n/a  Open case request for Colonoscopy.

## 2020-06-12 ENCOUNTER — CLINICAL SUPPORT (OUTPATIENT)
Dept: REHABILITATION | Facility: HOSPITAL | Age: 65
End: 2020-06-12
Payer: COMMERCIAL

## 2020-06-12 DIAGNOSIS — R26.2 DIFFICULTY WALKING: ICD-10-CM

## 2020-06-12 DIAGNOSIS — M25.562 CHRONIC PAIN OF LEFT KNEE: ICD-10-CM

## 2020-06-12 DIAGNOSIS — G89.29 CHRONIC PAIN OF LEFT KNEE: ICD-10-CM

## 2020-06-12 PROCEDURE — 97140 MANUAL THERAPY 1/> REGIONS: CPT

## 2020-06-12 PROCEDURE — 97110 THERAPEUTIC EXERCISES: CPT

## 2020-06-12 PROCEDURE — 97112 NEUROMUSCULAR REEDUCATION: CPT

## 2020-06-12 NOTE — PROGRESS NOTES
".    Physical Therapy Daily Treatment Note     Name: Hilario Thomas  Clinic Number: [id number]    Therapy Diagnosis:   Encounter Diagnoses   Name Primary?    Chronic pain of left knee     Difficulty walking      Physician: Mariel Quintanilla PA-C    Visit Date: 6/12/2020  Physician Orders: PT Eval and Treat   Medical Diagnosis from Referral:   M17.12 (ICD-10-CM) - Primary osteoarthritis of left knee   Z96.652 (ICD-10-CM) - Status post total left knee replacement      Evaluation Date: 5/7/2020  Authorization Period Expiration: 12/31/2020  Plan of Care Expiration: 8/14/2020  Visit # / Visits authorized: 8/ 20       Time In: 8:20 a.m.  Time Out: 9:15 a.m.  Total Billable Time: 50 minutes    Precautions: Standard    Subjective     Pt reports: Slight pain in the right knee, none in the involved knee.     He was compliant with home exercise program.  Response to previous treatment: no increase in pain  Functional change: improved ability to complete ADLs.     Pain: 0/10  Location: left knee      Objective     Knee Range of Motion:    Right  Left    Flexion 109 110/115   Extension 0 0       Hilario received therapeutic exercises to develop strength, endurance and ROM for 30 minutes including:  Recumbent cycle to improve active knee flexion and extension for 10 minutes, lvl 4  Quad set with bolster under heel for extension 10 x 10"  SLR - 4 x 8 -  3 lbs  LAQ 3 x 10  Heel raise 3 x 10 w/ 5" hold  4 way Bridges 10 x 10", ABD, ADD, No march  Hamstring curl 10 x 10" with manual assistance to achieve end range flexion  Lateral walks with BTB    Hilario received Neuromusclar Re-education for 15 minutes to improve hip control with functional mobility including the following:  Lateral walking with RTB to focus on hip and knee positioning  Leg press - 80 lbs 4 x 8 to improve functional squatting.  Step ups and lateral step ups with 12/6" step, cues for hip and knee control  Single leg balance 10 x 5"  Paloff press on blue foam " for balance training      Hilario received the following manual therapy techniques: Joint mobilizations were applied to the: left knee for 5 minutes, including:  Posterior glides with slight destraction from 90 degrees of knee flexion  Patellar mobilizations - all planes, inferior from 70 degrees of knee flexion      Home Exercises Provided and Patient Education Provided     Education provided:   - - Importance of achieving full knee extension  -Updated HEP to include single leg balance    Written Home Exercises Provided: Patient instructed to cont prior HEP.  Exercises were reviewed and Hilario was able to demonstrate them prior to the end of the session.  Hilario demonstrated good  understanding of the education provided.     See EMR under Patient Instructions for exercises provided prior visit.    Assessment     Able to progress hip and frontal plane strengthening without issue. He continues to progress very well and is primarily limited by right knee pain. Single leg balance remains challenged and can be progressed.     Hilario is progressing well towards his goals.   Pt prognosis is Excellent.     Pt will continue to benefit from skilled outpatient physical therapy to address the deficits listed in the problem list box on initial evaluation, provide pt/family education and to maximize pt's level of independence in the home and community environment.     Pt's spiritual, cultural and educational needs considered and pt agreeable to plan of care and goals.    Anticipated Barriers for therapy: covid-19 and transportation    GOALS: Short Term Goals:  6 weeks  1.Report decreased knee pain  < /=  2/10  to increase tolerance for sleeping at night and completing ADLs (met)  2. Increase knee ROM to 120 flexion and full extension in order to be able to perform ADLs without difficulty. (progressing, not met)  3. Increase strength by 1/3 MMT grade in quads and gluts  to increase tolerance for ADL and work activities.  (progressing, not met)  4. Pt to tolerate HEP to improve ROM and independence with ADL's (progressing, not met)     Long Term Goals: 12 weeks  1.Report decreased knee pain < / = 0/10  to increase tolerance for ambulation 3 miles without assistive device in community (progressing, not met)  2.Patient goal: return to hiking and ambulating at night without knee pain (progressing, not met)  3.Increase strength to >/= 4+/5 in quad and gluts  to increase tolerance for ADL and work activities. (progressing, not met)  4. Pt will perform 20 squats without L knee pain to improve functional mobility and reduce pain with daily tasks like in and out of the car (progressing, not met)    Plan   Plan of care Certification: 5/7/2020 to 8/14/2020.  Progress single leg exercises and balance.     Outpatient Physical Therapy 2 to 3 times weekly for 10 weeks to include the following interventions: Gait Training, Manual Therapy, Moist Heat/ Ice, Neuromuscular Re-ed, Patient Education, Self Care, Therapeutic Activites and Therapeutic Exercise.     Luigi Hope, PT, DPT

## 2020-06-15 ENCOUNTER — HOSPITAL ENCOUNTER (OUTPATIENT)
Dept: RADIOLOGY | Facility: HOSPITAL | Age: 65
Discharge: HOME | End: 2020-06-15
Attending: PHYSICIAN ASSISTANT
Payer: COMMERCIAL

## 2020-06-15 ENCOUNTER — OFFICE VISIT (OUTPATIENT)
Dept: ORTHOPEDICS | Facility: CLINIC | Age: 65
End: 2020-06-15
Payer: COMMERCIAL

## 2020-06-15 VITALS
TEMPERATURE: 97 F | SYSTOLIC BLOOD PRESSURE: 135 MMHG | BODY MASS INDEX: 28.79 KG/M2 | WEIGHT: 224.31 LBS | HEIGHT: 74 IN | DIASTOLIC BLOOD PRESSURE: 89 MMHG | HEART RATE: 70 BPM

## 2020-06-15 DIAGNOSIS — Z96.652 STATUS POST TOTAL LEFT KNEE REPLACEMENT: Primary | ICD-10-CM

## 2020-06-15 DIAGNOSIS — Z96.652 STATUS POST TOTAL LEFT KNEE REPLACEMENT: ICD-10-CM

## 2020-06-15 DIAGNOSIS — M17.11 PRIMARY OSTEOARTHRITIS OF RIGHT KNEE: ICD-10-CM

## 2020-06-15 PROCEDURE — 73562 X-RAY EXAM OF KNEE 3: CPT | Mod: 26,LT,, | Performed by: RADIOLOGY

## 2020-06-15 PROCEDURE — 73560 X-RAY EXAM OF KNEE 1 OR 2: CPT | Mod: TC,RT

## 2020-06-15 PROCEDURE — 73562 XR KNEE ORTHO LEFT: ICD-10-PCS | Mod: 26,LT,, | Performed by: RADIOLOGY

## 2020-06-15 PROCEDURE — 99024 PR POST-OP FOLLOW-UP VISIT: ICD-10-PCS | Mod: S$GLB,,, | Performed by: PHYSICIAN ASSISTANT

## 2020-06-15 PROCEDURE — 99999 PR PBB SHADOW E&M-EST. PATIENT-LVL III: ICD-10-PCS | Mod: PBBFAC,,, | Performed by: PHYSICIAN ASSISTANT

## 2020-06-15 PROCEDURE — 73560 X-RAY EXAM OF KNEE 1 OR 2: CPT | Mod: 26,RT,, | Performed by: RADIOLOGY

## 2020-06-15 PROCEDURE — 99024 POSTOP FOLLOW-UP VISIT: CPT | Mod: S$GLB,,, | Performed by: PHYSICIAN ASSISTANT

## 2020-06-15 PROCEDURE — 73560 XR KNEE ORTHO LEFT: ICD-10-PCS | Mod: 26,RT,, | Performed by: RADIOLOGY

## 2020-06-15 PROCEDURE — 99999 PR PBB SHADOW E&M-EST. PATIENT-LVL III: CPT | Mod: PBBFAC,,, | Performed by: PHYSICIAN ASSISTANT

## 2020-06-15 NOTE — PROGRESS NOTES
"Hilario Thomas presents for 6 week post-operative visit following a right FILOMENA total knee arthroplasty performed by Dr. Centeno on 5/5/2020. He is doing very well. He is off of pain medication.  Doing well in PT and has nearly met goals. Doing prehab for L TKA now.     Exam:   Blood pressure 135/89, pulse 70, temperature 97.2 °F (36.2 °C), temperature source Oral, height 6' 2" (1.88 m), weight 101.8 kg (224 lb 5.1 oz).   Ambulating well without assistive device  Incision is well healed   ROM: 0-120    New radiographs obtained and reviewed today revealing a well fixed and aligned prosthesis.  Advanced medial compartment OA on right right.     A/P:  6 weeks s/p right FILOMENA total knee replacement  - Outpatient PT ongoing in [facility]. He may stop when goals met.   - Reviewed antibiotic prophylaxis   - Patient is ready for L TKA. Tentative date in 2 months given (August 4th)  - Follow up in 6 weeks with Dr. Centeno. Pt will call clinic with problems/concerns.       "

## 2020-06-16 ENCOUNTER — CLINICAL SUPPORT (OUTPATIENT)
Dept: REHABILITATION | Facility: HOSPITAL | Age: 65
End: 2020-06-16
Payer: COMMERCIAL

## 2020-06-16 DIAGNOSIS — G89.29 CHRONIC PAIN OF LEFT KNEE: ICD-10-CM

## 2020-06-16 DIAGNOSIS — M25.562 CHRONIC PAIN OF LEFT KNEE: ICD-10-CM

## 2020-06-16 DIAGNOSIS — R26.2 DIFFICULTY WALKING: ICD-10-CM

## 2020-06-16 PROCEDURE — 97112 NEUROMUSCULAR REEDUCATION: CPT

## 2020-06-16 PROCEDURE — 97110 THERAPEUTIC EXERCISES: CPT

## 2020-06-16 NOTE — PROGRESS NOTES
".    Physical Therapy Daily Treatment Note     Name: Hilario Thomas  Clinic Number: [id number]    Therapy Diagnosis:   Encounter Diagnoses   Name Primary?    Chronic pain of left knee     Difficulty walking      Physician: Mariel Quintanilla PA-C    Visit Date: 6/16/2020  Physician Orders: PT Eval and Treat   Medical Diagnosis from Referral:   M17.12 (ICD-10-CM) - Primary osteoarthritis of left knee   Z96.652 (ICD-10-CM) - Status post total left knee replacement      Evaluation Date: 5/7/2020  Authorization Period Expiration: 12/31/2020  Plan of Care Expiration: 8/14/2020  Visit # / Visits authorized: 9/ 20       Time In: 8:50 a.m.  Time Out: 9:40 a.m.  Total Billable Time: 50 minutes    Precautions: Standard    Subjective     Pt reports: His MD was very pleased with his progress and they have scheduled his right knee for a TKA in August.    He was compliant with home exercise program.  Response to previous treatment: no increase in pain  Functional change: improved ability to complete ADLs.     Pain: 0/10  Location: left knee      Objective     Knee Range of Motion:    Right  Left    Flexion 109 110/115   Extension 0 0       Hilario received therapeutic exercises to develop strength, endurance and ROM for 35 minutes including:  Recumbent cycle to improve active knee flexion and extension for 10 minutes, lvl 4  Quad set with bolster under heel for extension 10 x 10"  SLR - 4 x 8 -  3 lbs (BLE)  LAQ 3 x 10  Heel raise 3 x 10 w/ 5" hold  4 way Bridges 10 x 10", ABD, ADD, No march  Lateral walks with BTB    Hilario received Neuromusclar Re-education for 15 minutes to improve hip control with functional mobility including the following:  Lateral walking with RTB to focus on hip and knee positioning  Leg press - 80 lbs 4 x 8 to improve functional squatting.  Step ups and lateral step ups with 12/6" step, cues for hip and knee control (BLE)  Single leg balance 10 x 5"  Paloff press on blue foam for balance " training      Hilario received the following manual therapy techniques: Joint mobilizations were applied to the: left knee for 0 minutes, including:        Home Exercises Provided and Patient Education Provided     Education provided:   - - Importance of achieving full knee extension  -Updated HEP to include single leg balance    Written Home Exercises Provided: Patient instructed to cont prior HEP.  Exercises were reviewed and Hilario was able to demonstrate them prior to the end of the session.  Hilario demonstrated good  understanding of the education provided.     See EMR under Patient Instructions for exercises provided prior visit.    Assessment     Patient continues to progress very well. Treatment will progress to include BLE to prep the right knee for his upcoming TKA. Prehab in this regard will improve treatment outcomes following his surgery.     Hilario is progressing well towards his goals.   Pt prognosis is Excellent.     Pt will continue to benefit from skilled outpatient physical therapy to address the deficits listed in the problem list box on initial evaluation, provide pt/family education and to maximize pt's level of independence in the home and community environment.     Pt's spiritual, cultural and educational needs considered and pt agreeable to plan of care and goals.    Anticipated Barriers for therapy: covid-19 and transportation    GOALS: Short Term Goals:  6 weeks  1.Report decreased knee pain  < /=  2/10  to increase tolerance for sleeping at night and completing ADLs (met)  2. Increase knee ROM to 120 flexion and full extension in order to be able to perform ADLs without difficulty. (progressing, not met)  3. Increase strength by 1/3 MMT grade in quads and gluts  to increase tolerance for ADL and work activities. (progressing, not met)  4. Pt to tolerate HEP to improve ROM and independence with ADL's (progressing, not met)     Long Term Goals: 12 weeks  1.Report decreased knee pain < /  = 0/10  to increase tolerance for ambulation 3 miles without assistive device in community (progressing, not met)  2.Patient goal: return to hiking and ambulating at night without knee pain (progressing, not met)  3.Increase strength to >/= 4+/5 in quad and gluts  to increase tolerance for ADL and work activities. (progressing, not met)  4. Pt will perform 20 squats without L knee pain to improve functional mobility and reduce pain with daily tasks like in and out of the car (progressing, not met)    Plan   Plan of care Certification: 5/7/2020 to 8/14/2020.  Progress single leg exercises and balance.     Outpatient Physical Therapy 2 to 3 times weekly for 10 weeks to include the following interventions: Gait Training, Manual Therapy, Moist Heat/ Ice, Neuromuscular Re-ed, Patient Education, Self Care, Therapeutic Activites and Therapeutic Exercise.     Luigi Hope, PT, DPT

## 2020-06-17 DIAGNOSIS — M17.11 PRIMARY OSTEOARTHRITIS OF RIGHT KNEE: Primary | ICD-10-CM

## 2020-06-19 ENCOUNTER — CLINICAL SUPPORT (OUTPATIENT)
Dept: REHABILITATION | Facility: HOSPITAL | Age: 65
End: 2020-06-19
Payer: COMMERCIAL

## 2020-06-19 DIAGNOSIS — M17.11 PRIMARY OSTEOARTHRITIS OF RIGHT KNEE: ICD-10-CM

## 2020-06-19 DIAGNOSIS — R26.2 DIFFICULTY WALKING: ICD-10-CM

## 2020-06-19 DIAGNOSIS — M25.562 CHRONIC PAIN OF LEFT KNEE: ICD-10-CM

## 2020-06-19 DIAGNOSIS — G89.29 CHRONIC PAIN OF LEFT KNEE: ICD-10-CM

## 2020-06-19 PROCEDURE — 97110 THERAPEUTIC EXERCISES: CPT

## 2020-06-19 PROCEDURE — 97112 NEUROMUSCULAR REEDUCATION: CPT

## 2020-06-19 NOTE — PROGRESS NOTES
".    Physical Therapy Daily Treatment Note     Name: Hilario Thomas  Clinic Number: [id number]    Therapy Diagnosis:   Encounter Diagnoses   Name Primary?    Primary osteoarthritis of right knee     Chronic pain of left knee     Difficulty walking      Physician: Mariel Quintanilla PA-C    Visit Date: 6/19/2020  Physician Orders: PT Eval and Treat   Medical Diagnosis from Referral:   M17.12 (ICD-10-CM) - Primary osteoarthritis of left knee   Z96.652 (ICD-10-CM) - Status post total left knee replacement      Evaluation Date: 5/7/2020  Authorization Period Expiration: 12/31/2020  Plan of Care Expiration: 8/14/2020  Visit # / Visits authorized: 9/ 20       Time In: 8:20  Time Out: 920  Total Billable Time: 55 minutes    Precautions: Standard    Subjective     Pt reports: No complaints of knee pain.    He was compliant with home exercise program.  Response to previous treatment: no increase in pain  Functional change: improved ability to complete ADLs.     Pain: 0/10  Location: left knee      Objective     Knee Range of Motion:    Right  Left    Flexion 109 110/115   Extension 0 0     Hilario received therapeutic exercises to develop strength, endurance and ROM for 40 minutes including:  Recumbent cycle to improve active knee flexion and extension for 10 minutes, lvl 4  Quad set with bolster under heel for extension 10 x 10"  SLR - 4 x 8 -  3 lbs (BLE)  LAQ 3 x 10 on knee extension machine w/ 5lbs  Heel raise 3 x 10 w/ 5" hold  4 way Bridges 10 x 10", ABD, ADD, No march  Lateral walks with BTB    Hilario received Neuromusclar Re-education for 15 minutes to improve hip control with functional mobility including the following:  Lateral walking with RTB to focus on hip and knee positioning  Leg press - 80 lbs 4 x 8 to improve functional squatting.  Step ups and lateral step ups with 12/6" step, cues for hip and knee control (BLE)  Single leg balance 10 x 5"  Paloff press on blue foam for balance training      Hilario " received the following manual therapy techniques: Joint mobilizations were applied to the: left knee for 0 minutes, including:        Home Exercises Provided and Patient Education Provided     Education provided:   - - Importance of achieving full knee extension  -Updated HEP to include single leg balance    Written Home Exercises Provided: Patient instructed to cont prior HEP.  Exercises were reviewed and Hilario was able to demonstrate them prior to the end of the session.  Hilario demonstrated good  understanding of the education provided.     See EMR under Patient Instructions for exercises provided prior visit.    Assessment     Patient continues to progress very well with treatment. Qudriceps strength continues to improve. ROM nearly WNL.    Hilario is progressing well towards his goals.   Pt prognosis is Excellent.     Pt will continue to benefit from skilled outpatient physical therapy to address the deficits listed in the problem list box on initial evaluation, provide pt/family education and to maximize pt's level of independence in the home and community environment.     Pt's spiritual, cultural and educational needs considered and pt agreeable to plan of care and goals.    Anticipated Barriers for therapy: covid-19 and transportation    GOALS: Short Term Goals:  6 weeks  1.Report decreased knee pain  < /=  2/10  to increase tolerance for sleeping at night and completing ADLs (met)  2. Increase knee ROM to 120 flexion and full extension in order to be able to perform ADLs without difficulty. (progressing, not met)  3. Increase strength by 1/3 MMT grade in quads and gluts  to increase tolerance for ADL and work activities. (progressing, not met)  4. Pt to tolerate HEP to improve ROM and independence with ADL's (progressing, not met)     Long Term Goals: 12 weeks  1.Report decreased knee pain < / = 0/10  to increase tolerance for ambulation 3 miles without assistive device in community (progressing, not  met)  2.Patient goal: return to hiking and ambulating at night without knee pain (progressing, not met)  3.Increase strength to >/= 4+/5 in quad and gluts  to increase tolerance for ADL and work activities. (progressing, not met)  4. Pt will perform 20 squats without L knee pain to improve functional mobility and reduce pain with daily tasks like in and out of the car (progressing, not met)    Plan   Plan of care Certification: 5/7/2020 to 8/14/2020.  Progress single leg exercises and balance.     Outpatient Physical Therapy 2 to 3 times weekly for 10 weeks to include the following interventions: Gait Training, Manual Therapy, Moist Heat/ Ice, Neuromuscular Re-ed, Patient Education, Self Care, Therapeutic Activites and Therapeutic Exercise.     Luigi Hope, PT, DPT

## 2020-06-22 ENCOUNTER — CLINICAL SUPPORT (OUTPATIENT)
Dept: REHABILITATION | Facility: HOSPITAL | Age: 65
End: 2020-06-22
Payer: COMMERCIAL

## 2020-06-22 DIAGNOSIS — M25.562 CHRONIC PAIN OF LEFT KNEE: ICD-10-CM

## 2020-06-22 DIAGNOSIS — R26.2 DIFFICULTY WALKING: ICD-10-CM

## 2020-06-22 DIAGNOSIS — G89.29 CHRONIC PAIN OF LEFT KNEE: ICD-10-CM

## 2020-06-22 PROCEDURE — 97110 THERAPEUTIC EXERCISES: CPT | Mod: CQ

## 2020-06-22 PROCEDURE — 97112 NEUROMUSCULAR REEDUCATION: CPT | Mod: CQ

## 2020-06-22 PROCEDURE — 97530 THERAPEUTIC ACTIVITIES: CPT | Mod: CQ

## 2020-06-22 NOTE — PROGRESS NOTES
".    Physical Therapy Daily Treatment Note     Name: Hilario Thomas  Clinic Number: [id number]    Therapy Diagnosis:   Encounter Diagnoses   Name Primary?    Chronic pain of left knee     Difficulty walking      Physician: Mariel Quintanilla PA-C    Visit Date: 6/22/2020  Physician Orders: PT Eval and Treat   Medical Diagnosis from Referral:   M17.12 (ICD-10-CM) - Primary osteoarthritis of left knee   Z96.652 (ICD-10-CM) - Status post total left knee replacement      Evaluation Date: 5/7/2020  Authorization Period Expiration: 12/31/2020  Plan of Care Expiration: 8/14/2020  Visit # / Visits authorized: 14/20       Time In: 0700  Time Out: 0745  Total Billable Time: 45 minutes    Precautions: Standard    Subjective     Pt reports: Pt returns to therapy this am with no c/o increased L knee pain. He states his R knee still bothers him, especially at night.     He was compliant with home exercise program.  Response to previous treatment: No adverse effects  Functional change: Improved ability to complete ADLs    Pain: 0/10  Location: left knee      Objective     Knee Range of Motion:    Right  Left    Flexion 109 110/115   Extension 0 0     Hilario received therapeutic exercises to develop strength, endurance and ROM for 25 minutes including:    Upright bike (lvl 4) x 8' to improve active knee flexion and extension   Shuttle DLSQ (3.5 bands) 2 x 15  Shuttle SLSQ (1.5 bands) 2 x 15 angelica   S/L clams c orange tb 10 x 10" angelica   Quad sets 5" hold x 3'  SLR 3# 2 x 10 angelica with emphasis on TKE  LAQ 3 x 10 on knee extension machine w/ 5lbs - np  Heel raise 3 x 10 w/ 5" hold - np  4 way Bridges 10 x 10", ABD, ADD, No march - np    Hilario received Neuromusclar Re-education for 10 minutes to improve hip control with functional mobility including the following:    SLS 2 x 30" angelica  Tandem walking  6in forward step ups s UE support 2 x 10 angelica   Paloff press on blue foam for balance training - np    Hilario received the following " manual therapy techniques: Joint mobilizations were applied to the: left knee for 0 minutes, including:    Hilario participated in dynamic functional therapeutic activities to improve functional performance for 10 minutes including:    HK/BK x 1 lap each  Lateral steps c orange tb x 2 laps    Home Exercises Provided and Patient Education Provided     Education provided:   - Importance of achieving full knee extension  - Updated HEP to include single leg balance    Written Home Exercises Provided: Patient instructed to cont prior HEP.  Exercises were reviewed and Hilario was able to demonstrate them prior to the end of the session.  Hilario demonstrated good  understanding of the education provided.     See EMR under Patient Instructions for exercises provided prior visit.    Assessment     Pt was able to complete all exercises including progressions with no c/o increased L knee pain. Pt demonstrated occasional LOB during balance activities. Noted min quad lag during weighted SLR. No concerns with ROM at this time. Encouraged continued compliance with HEP; pt voiced understanding. No adverse effects reported p tx.     Hilario is progressing well towards his goals.   Pt prognosis is Excellent.     Pt will continue to benefit from skilled outpatient physical therapy to address the deficits listed in the problem list box on initial evaluation, provide pt/family education and to maximize pt's level of independence in the home and community environment.     Pt's spiritual, cultural and educational needs considered and pt agreeable to plan of care and goals.    Anticipated Barriers for therapy: covid-19 and transportation    GOALS: Short Term Goals:  6 weeks  1.Report decreased knee pain  < /=  2/10  to increase tolerance for sleeping at night and completing ADLs (met)  2. Increase knee ROM to 120 flexion and full extension in order to be able to perform ADLs without difficulty. (progressing, not met)  3. Increase strength  by 1/3 MMT grade in quads and gluts  to increase tolerance for ADL and work activities. (progressing, not met)  4. Pt to tolerate HEP to improve ROM and independence with ADL's (progressing, not met)     Long Term Goals: 12 weeks  1.Report decreased knee pain < / = 0/10  to increase tolerance for ambulation 3 miles without assistive device in community (progressing, not met)  2.Patient goal: return to hiking and ambulating at night without knee pain (progressing, not met)  3.Increase strength to >/= 4+/5 in quad and gluts  to increase tolerance for ADL and work activities. (progressing, not met)  4. Pt will perform 20 squats without L knee pain to improve functional mobility and reduce pain with daily tasks like in and out of the car (progressing, not met)    Plan   Plan of care Certification: 5/7/2020 to 8/14/2020.    Progress single leg exercises and balance activities as tolerated.    Outpatient Physical Therapy 2 to 3 times weekly for 10 weeks to include the following interventions: Gait Training, Manual Therapy, Moist Heat/ Ice, Neuromuscular Re-ed, Patient Education, Self Care, Therapeutic Activites and Therapeutic Exercise.     Leelee Ty, PTA

## 2020-06-26 ENCOUNTER — CLINICAL SUPPORT (OUTPATIENT)
Dept: REHABILITATION | Facility: HOSPITAL | Age: 65
End: 2020-06-26
Payer: COMMERCIAL

## 2020-06-26 DIAGNOSIS — G89.29 CHRONIC PAIN OF LEFT KNEE: ICD-10-CM

## 2020-06-26 DIAGNOSIS — M25.562 CHRONIC PAIN OF LEFT KNEE: ICD-10-CM

## 2020-06-26 DIAGNOSIS — R26.2 DIFFICULTY WALKING: ICD-10-CM

## 2020-06-26 PROCEDURE — 97112 NEUROMUSCULAR REEDUCATION: CPT | Mod: CQ

## 2020-06-26 PROCEDURE — 97530 THERAPEUTIC ACTIVITIES: CPT | Mod: CQ

## 2020-06-26 PROCEDURE — 97110 THERAPEUTIC EXERCISES: CPT | Mod: CQ

## 2020-06-26 NOTE — PROGRESS NOTES
".    Physical Therapy Daily Treatment Note     Name: Hilario Thomas  Clinic Number: [id number]    Therapy Diagnosis:   Encounter Diagnoses   Name Primary?    Chronic pain of left knee     Difficulty walking      Physician: Mariel Quintanilla PA-C    Visit Date: 6/26/2020  Physician Orders: PT Eval and Treat   Medical Diagnosis from Referral:   M17.12 (ICD-10-CM) - Primary osteoarthritis of left knee   Z96.652 (ICD-10-CM) - Status post total left knee replacement      Evaluation Date: 5/7/2020  Authorization Period Expiration: 12/31/2020  Plan of Care Expiration: 8/14/2020  Visit # / Visits authorized: 15/20     Time In: 1414  Time Out: 1502  Total Billable Time: 48 minutes    Precautions: Standard    Subjective     Pt reports: Pt returns to therapy this am with no c/o L knee pain upon entry. He states his R knee still bothers him, especially at night.     He was compliant with home exercise program.  Response to previous treatment: No adverse effects  Functional change: Improved ability to complete ADLs    Pain: 0/10  Location: left knee      Objective     Knee Range of Motion:    Right  Left    Flexion 109 110/115   Extension 0 0     Hilario received therapeutic exercises to develop strength, endurance and ROM for 23 minutes including:    Upright bike (lvl 4) x 8' to improve active knee flexion and extension   Shuttle DLSQ (3.5 bands) 2 x 15  Shuttle SLSQ (1.5 bands) 2 x 15 angelica   S/L clams c green tb 10 x 10" angelica   LAQ 3 x 10 on knee extension machine w/ 5lbs - np  Heel raise 3 x 10 w/ 5" hold - np  4 way Bridges 10 x 10", ABD, ADD, No march - np    Hilario received Neuromusclar Re-education for 15 minutes to improve hip control with functional mobility including the following:    SLS on hard foam 2 x 30" angelica  6in forward step ups s UE support 2 x 10 angelica   TKE 5" hold x 3'  Quad sets 5" hold x 3'  Seated SLR 2 x 10 angelica with emphasis on TKE  Paloff press on blue foam for balance training - np    Hilario received " the following manual therapy techniques: Joint mobilizations were applied to the: left knee for 0 minutes including:    Hilario participated in dynamic functional therapeutic activities to improve functional performance for 10 minutes including:    HK/BK x 1 lap each  Lateral steps c orange tb x 2 laps    Home Exercises Provided and Patient Education Provided     Education provided:   - Importance of achieving full knee extension  - Updated HEP to include single leg balance    Written Home Exercises Provided: Patient instructed to cont prior HEP.  Exercises were reviewed and Hilario was able to demonstrate them prior to the end of the session.  Hilario demonstrated good  understanding of the education provided.     See EMR under Patient Instructions for exercises provided prior visit.    Assessment     Pt was able to complete all exercises including progressions with no c/o increased L knee pain. Pt demonstrated occasional LOB during progressed SLS. Pt demonstrated full active TKE with no noted quad lag during seated SLR. Encouraged continued compliance with HEP; pt voiced understanding. No adverse effects reported p tx.     Hilario is progressing well towards his goals.   Pt prognosis is Excellent.     Pt will continue to benefit from skilled outpatient physical therapy to address the deficits listed in the problem list box on initial evaluation, provide pt/family education and to maximize pt's level of independence in the home and community environment.     Pt's spiritual, cultural and educational needs considered and pt agreeable to plan of care and goals.    Anticipated Barriers for therapy: covid-19 and transportation    GOALS: Short Term Goals:  6 weeks  1.Report decreased knee pain  < /=  2/10  to increase tolerance for sleeping at night and completing ADLs (met)  2. Increase knee ROM to 120 flexion and full extension in order to be able to perform ADLs without difficulty. (progressing, not met)  3. Increase  strength by 1/3 MMT grade in quads and gluts  to increase tolerance for ADL and work activities. (progressing, not met)  4. Pt to tolerate HEP to improve ROM and independence with ADL's (progressing, not met)     Long Term Goals: 12 weeks  1.Report decreased knee pain < / = 0/10  to increase tolerance for ambulation 3 miles without assistive device in community (progressing, not met)  2.Patient goal: return to hiking and ambulating at night without knee pain (progressing, not met)  3.Increase strength to >/= 4+/5 in quad and gluts  to increase tolerance for ADL and work activities. (progressing, not met)  4. Pt will perform 20 squats without L knee pain to improve functional mobility and reduce pain with daily tasks like in and out of the car (progressing, not met)    Plan   Plan of care Certification: 5/7/2020 to 8/14/2020.    Progress single leg exercises and balance activities as tolerated.    Outpatient Physical Therapy 2 to 3 times weekly for 10 weeks to include the following interventions: Gait Training, Manual Therapy, Moist Heat/ Ice, Neuromuscular Re-ed, Patient Education, Self Care, Therapeutic Activites and Therapeutic Exercise.     Leelee Ty, PTA

## 2020-06-30 ENCOUNTER — CLINICAL SUPPORT (OUTPATIENT)
Dept: REHABILITATION | Facility: HOSPITAL | Age: 65
End: 2020-06-30
Payer: COMMERCIAL

## 2020-06-30 DIAGNOSIS — R26.2 DIFFICULTY WALKING: ICD-10-CM

## 2020-06-30 DIAGNOSIS — G89.29 CHRONIC PAIN OF LEFT KNEE: ICD-10-CM

## 2020-06-30 DIAGNOSIS — M25.562 CHRONIC PAIN OF LEFT KNEE: ICD-10-CM

## 2020-06-30 PROCEDURE — 97112 NEUROMUSCULAR REEDUCATION: CPT

## 2020-06-30 PROCEDURE — 97110 THERAPEUTIC EXERCISES: CPT

## 2020-06-30 NOTE — PROGRESS NOTES
".    Physical Therapy Daily Treatment Note     Name: Hilario Thomas  Clinic Number: [id number]    Therapy Diagnosis:   Encounter Diagnoses   Name Primary?    Chronic pain of left knee     Difficulty walking      Physician: Mariel Quintanilla PA-C    Visit Date: 6/30/2020  Physician Orders: PT Eval and Treat   Medical Diagnosis from Referral:   M17.12 (ICD-10-CM) - Primary osteoarthritis of left knee   Z96.652 (ICD-10-CM) - Status post total left knee replacement      Evaluation Date: 5/7/2020  Authorization Period Expiration: 12/31/2020  Plan of Care Expiration: 8/14/2020  Visit # / Visits authorized: 16/20     Time In: 1405  Time Out: 1555  Total Billable Time: 50 minutes    Precautions: Standard    Subjective     Pt reports: No knee pain.    He was compliant with home exercise program.  Response to previous treatment: No adverse effects  Functional change: Improved ability to complete ADLs    Pain: 0/10  Location: left knee      Objective     Knee Range of Motion:    Right  Left    Flexion 109 115/120   Extension 0 0     Hilairo received therapeutic exercises to develop strength, endurance and ROM for 25 minutes including:    Upright bike (lvl 4) x 8' to improve active knee flexion and extension   Shuttle DLSQ (3.5 bands) 2 x 15  Shuttle SLSQ (1.5 bands) 2 x 15 angelica   S/L clams c green tb 10 x 10" angelica - np  LAQ 3 x 10 on knee extension machine w/ 5lbs - np  Heel raise 3 x 10 w/ 5" hold - np  Bridge 10 x 10"    Hilario received Neuromusclar Re-education for 25 minutes to improve hip control with functional mobility including the following:    SLS on hard foam 2 x 30" angelica  6in forward and lateral step ups s UE support 2 x 10 angelica   TKE 5" hold x 3'  Seated SLR 2 x 10 angelica with emphasis on TKE  Paloff press on blue foam for balance training - np  Lateral steps c orange tb x 5 laps    Hilario received the following manual therapy techniques: Joint mobilizations were applied to the: left knee for 0 minutes " including:    Hilario participated in dynamic functional therapeutic activities to improve functional performance for 0 minutes including:      Home Exercises Provided and Patient Education Provided     Education provided:   - Importance of achieving full knee extension  - Updated HEP to include single leg balance    Written Home Exercises Provided: Patient instructed to cont prior HEP.  Exercises were reviewed and Hilario was able to demonstrate them prior to the end of the session.  Hilario demonstrated good  understanding of the education provided.     See EMR under Patient Instructions for exercises provided prior visit.    Assessment     Requires verbal cuing to maintain knee extension with SLR. Strength continue to improve bilaterally. Single leg stance remains challenging.     Hilario is progressing well towards his goals.   Pt prognosis is Excellent.     Pt will continue to benefit from skilled outpatient physical therapy to address the deficits listed in the problem list box on initial evaluation, provide pt/family education and to maximize pt's level of independence in the home and community environment.     Pt's spiritual, cultural and educational needs considered and pt agreeable to plan of care and goals.    Anticipated Barriers for therapy: covid-19 and transportation    GOALS: Short Term Goals:  6 weeks  1.Report decreased knee pain  < /=  2/10  to increase tolerance for sleeping at night and completing ADLs (met)  2. Increase knee ROM to 120 flexion and full extension in order to be able to perform ADLs without difficulty. (progressing, not met)  3. Increase strength by 1/3 MMT grade in quads and gluts  to increase tolerance for ADL and work activities. (progressing, not met)  4. Pt to tolerate HEP to improve ROM and independence with ADL's (progressing, not met)     Long Term Goals: 12 weeks  1.Report decreased knee pain < / = 0/10  to increase tolerance for ambulation 3 miles without assistive  device in community (progressing, not met)  2.Patient goal: return to hiking and ambulating at night without knee pain (progressing, not met)  3.Increase strength to >/= 4+/5 in quad and gluts  to increase tolerance for ADL and work activities. (progressing, not met)  4. Pt will perform 20 squats without L knee pain to improve functional mobility and reduce pain with daily tasks like in and out of the car (progressing, not met)    Plan   Plan of care Certification: 5/7/2020 to 8/14/2020.    Progress single leg exercises and balance activities as tolerated.    Outpatient Physical Therapy 2 to 3 times weekly for 10 weeks to include the following interventions: Gait Training, Manual Therapy, Moist Heat/ Ice, Neuromuscular Re-ed, Patient Education, Self Care, Therapeutic Activites and Therapeutic Exercise.     Luigi Hope, PT

## 2020-07-02 ENCOUNTER — CLINICAL SUPPORT (OUTPATIENT)
Dept: REHABILITATION | Facility: HOSPITAL | Age: 65
End: 2020-07-02
Payer: COMMERCIAL

## 2020-07-02 DIAGNOSIS — G89.29 CHRONIC PAIN OF LEFT KNEE: ICD-10-CM

## 2020-07-02 DIAGNOSIS — M25.562 CHRONIC PAIN OF LEFT KNEE: ICD-10-CM

## 2020-07-02 DIAGNOSIS — R26.2 DIFFICULTY WALKING: ICD-10-CM

## 2020-07-02 PROCEDURE — 97110 THERAPEUTIC EXERCISES: CPT

## 2020-07-02 PROCEDURE — 97112 NEUROMUSCULAR REEDUCATION: CPT

## 2020-07-02 NOTE — PROGRESS NOTES
".    Physical Therapy Daily Treatment Note     Name: Hilario Thomas  Clinic Number: [id number]    Therapy Diagnosis:   Encounter Diagnoses   Name Primary?    Chronic pain of left knee     Difficulty walking      Physician: Mariel Quintanilla PA-C    Visit Date: 7/2/2020  Physician Orders: PT Eval and Treat   Medical Diagnosis from Referral:   M17.12 (ICD-10-CM) - Primary osteoarthritis of left knee   Z96.652 (ICD-10-CM) - Status post total left knee replacement      Evaluation Date: 5/7/2020  Authorization Period Expiration: 12/31/2020  Plan of Care Expiration: 8/14/2020  Visit # / Visits authorized: 17/20     Time In: 1335  Time Out: 1420  Total Billable Time: 45 minutes    Precautions: Standard    Subjective     Pt reports: He is pleased by his progress.    He was compliant with home exercise program.  Response to previous treatment: No adverse effects  Functional change: Improved ability to complete ADLs    Pain: 0/10  Location: left knee      Objective     Knee Range of Motion:    Right  Left    Flexion 109 115/120   Extension 0 0     Hilario received therapeutic exercises to develop strength, endurance and ROM for 15 minutes including:    Elliptical lvl 2 for quadriceps strength and muscle endurance   Shuttle DLSQ (3.5 bands) 2 x 15  Shuttle SLSQ (1.5 bands) 2 x 15 angelica   S/L clams c green tb 10 x 10" angelica - np  LAQ 3 x 10 on knee extension machine w/ 5lbs - np  Heel raise 3 x 10 w/ 5" hold - np  Bridge 10 x 10"    Hilario received Neuromusclar Re-education for 25 minutes to improve hip control with functional mobility including the following:    SLS on hard foam 2 x 30" angelica  6in forward and lateral step ups s UE support 2 x 10 angelica   TKE 5" hold x 3'  Seated SLR 2 x 10 angelica with emphasis on TKE  Paloff press on blue foam for balance training - np  Lateral steps c orange tb x 5 laps  Step up with 12" box    Hilario received the following manual therapy techniques: Joint mobilizations were applied to the: left " knee for 0 minutes including:    Hilario participated in dynamic functional therapeutic activities to improve functional performance for 0 minutes including:      Home Exercises Provided and Patient Education Provided     Education provided:   - Importance of achieving full knee extension  - Updated HEP to include single leg balance    Written Home Exercises Provided: Patient instructed to cont prior HEP.  Exercises were reviewed and Hilario was able to demonstrate them prior to the end of the session.  Hilario demonstrated good  understanding of the education provided.     See EMR under Patient Instructions for exercises provided prior visit.    Assessment     Patient continues to progress very well with treatment. Will be ready for discharge in time for his upcoming right TKA. Right knee is most limiting factor at this stage of rehabilitation.     Hilario is progressing well towards his goals.   Pt prognosis is Excellent.     Pt will continue to benefit from skilled outpatient physical therapy to address the deficits listed in the problem list box on initial evaluation, provide pt/family education and to maximize pt's level of independence in the home and community environment.     Pt's spiritual, cultural and educational needs considered and pt agreeable to plan of care and goals.    Anticipated Barriers for therapy: covid-19 and transportation    GOALS: Short Term Goals:  6 weeks  1.Report decreased knee pain  < /=  2/10  to increase tolerance for sleeping at night and completing ADLs (met)  2. Increase knee ROM to 120 flexion and full extension in order to be able to perform ADLs without difficulty. (progressing, not met)  3. Increase strength by 1/3 MMT grade in quads and gluts  to increase tolerance for ADL and work activities. (progressing, not met)  4. Pt to tolerate HEP to improve ROM and independence with ADL's (progressing, not met)     Long Term Goals: 12 weeks  1.Report decreased knee pain < / = 0/10   to increase tolerance for ambulation 3 miles without assistive device in community (progressing, not met)  2.Patient goal: return to hiking and ambulating at night without knee pain (progressing, not met)  3.Increase strength to >/= 4+/5 in quad and gluts  to increase tolerance for ADL and work activities. (progressing, not met)  4. Pt will perform 20 squats without L knee pain to improve functional mobility and reduce pain with daily tasks like in and out of the car (progressing, not met)    Plan   Plan of care Certification: 5/7/2020 to 8/14/2020.    Progress single leg exercises and balance activities as tolerated.    Outpatient Physical Therapy 2 to 3 times weekly for 10 weeks to include the following interventions: Gait Training, Manual Therapy, Moist Heat/ Ice, Neuromuscular Re-ed, Patient Education, Self Care, Therapeutic Activites and Therapeutic Exercise.     Luigi Hope, PT, DPT

## 2020-07-20 ENCOUNTER — CLINICAL SUPPORT (OUTPATIENT)
Dept: REHABILITATION | Facility: HOSPITAL | Age: 65
End: 2020-07-20
Payer: COMMERCIAL

## 2020-07-20 DIAGNOSIS — G89.29 CHRONIC PAIN OF LEFT KNEE: ICD-10-CM

## 2020-07-20 DIAGNOSIS — M25.562 CHRONIC PAIN OF LEFT KNEE: ICD-10-CM

## 2020-07-20 DIAGNOSIS — R26.2 DIFFICULTY WALKING: ICD-10-CM

## 2020-07-20 PROCEDURE — 97112 NEUROMUSCULAR REEDUCATION: CPT

## 2020-07-20 PROCEDURE — 97110 THERAPEUTIC EXERCISES: CPT

## 2020-07-20 NOTE — PROGRESS NOTES
".    Physical Therapy Daily Treatment Note     Name: Hilario Thmoas  Clinic Number: [id number]    Therapy Diagnosis:   Encounter Diagnoses   Name Primary?    Chronic pain of left knee     Difficulty walking      Physician: Mariel Quintanilla PA-C    Visit Date: 7/20/2020  Physician Orders: PT Eval and Treat   Medical Diagnosis from Referral:   M17.12 (ICD-10-CM) - Primary osteoarthritis of left knee   Z96.652 (ICD-10-CM) - Status post total left knee replacement      Evaluation Date: 5/7/2020  Authorization Period Expiration: 12/31/2020  Plan of Care Expiration: 8/14/2020  Visit # / Visits authorized: 18/20     Time In: 0810  Time Out: 0900  Total Billable Time: 45 minutes    Precautions: Standard    Subjective     Pt reports: No reports of knee pain.    He was compliant with home exercise program.  Response to previous treatment: No adverse effects  Functional change: Improved ability to complete ADLs    Pain: 0/10  Location: left knee      Objective     Knee Range of Motion:    Right  Left    Flexion 109 115/120   Extension 0 0     Hilario received therapeutic exercises to develop strength, endurance and ROM for 15 minutes including:    Elliptical lvl 2 for quadriceps strength and muscle endurance   Shuttle DLSQ (3.5 bands) 2 x 15  Shuttle SLSQ (1.5 bands) 2 x 15 angelica   S/L clams c green tb 10 x 10" angelica - np  LAQ 3 x 10 on knee extension machine w/ 5lbs - np  Heel raise 3 x 10 w/ 5" hold - np  Bridge 10 x 10"    Hilario received Neuromusclar Re-education for 30 minutes to improve hip control with functional mobility including the following:    SLS on hard foam 2 x 30" angelica  6in forward and lateral step ups s UE support 2 x 10 angelica   TKE 5" hold x 3'  Seated SLR 2 x 10 angelica with emphasis on TKE  Step up with 12" box  Squat training with 24 in box an GTB    Hilario received the following manual therapy techniques: Joint mobilizations were applied to the: left knee for 0 minutes including:    Hilario participated in " dynamic functional therapeutic activities to improve functional performance for 0 minutes including:      Home Exercises Provided and Patient Education Provided     Education provided:   - Importance of achieving full knee extension  - Updated HEP to include single leg balance    Written Home Exercises Provided: Patient instructed to cont prior HEP.  Exercises were reviewed and Hilario was able to demonstrate them prior to the end of the session.  Hilario demonstrated good  understanding of the education provided.     See EMR under Patient Instructions for exercises provided prior visit.    Assessment     Patient continues to progress very well. Will be ready for discharge in preparation for his upcoming TKA.    Hilario is progressing well towards his goals.   Pt prognosis is Excellent.     Pt will continue to benefit from skilled outpatient physical therapy to address the deficits listed in the problem list box on initial evaluation, provide pt/family education and to maximize pt's level of independence in the home and community environment.     Pt's spiritual, cultural and educational needs considered and pt agreeable to plan of care and goals.    Anticipated Barriers for therapy: covid-19 and transportation    GOALS: Short Term Goals:  6 weeks  1.Report decreased knee pain  < /=  2/10  to increase tolerance for sleeping at night and completing ADLs (met)  2. Increase knee ROM to 120 flexion and full extension in order to be able to perform ADLs without difficulty. (progressing, not met)  3. Increase strength by 1/3 MMT grade in quads and gluts  to increase tolerance for ADL and work activities. (progressing, not met)  4. Pt to tolerate HEP to improve ROM and independence with ADL's (progressing, not met)     Long Term Goals: 12 weeks  1.Report decreased knee pain < / = 0/10  to increase tolerance for ambulation 3 miles without assistive device in community (progressing, not met)  2.Patient goal: return to [employer]  and ambulating at night without knee pain (progressing, not met)  3.Increase strength to >/= 4+/5 in quad and gluts  to increase tolerance for ADL and work activities. (progressing, not met)  4. Pt will perform 20 squats without L knee pain to improve functional mobility and reduce pain with daily tasks like in and out of the car (progressing, not met)    Plan   Plan of care Certification: 5/7/2020 to 8/14/2020.    Progress single leg exercises and balance activities as tolerated.    Outpatient Physical Therapy 2 to 3 times weekly for 10 weeks to include the following interventions: Gait Training, Manual Therapy, Moist Heat/ Ice, Neuromuscular Re-ed, Patient Education, Self Care, Therapeutic Activites and Therapeutic Exercise.     Luigi Hope, PT, DPT

## 2020-07-21 ENCOUNTER — TELEPHONE (OUTPATIENT)
Dept: PREADMISSION TESTING | Facility: HOSPITAL | Age: 65
End: 2020-07-21

## 2020-07-21 DIAGNOSIS — Z01.818 PRE-OP TESTING: Primary | ICD-10-CM

## 2020-07-21 DIAGNOSIS — M79.604 PAIN OF RIGHT LOWER EXTREMITY: ICD-10-CM

## 2020-07-21 NOTE — TELEPHONE ENCOUNTER
----- Message from Jannie Jesus, RN sent at 7/21/2020  1:52 PM CDT -----  ORTHO  TOTAL KNEE   8/4  DR EAGLE    Please schedule LAB (BMP, CBC, PT/INR) and POC NP for clearance.     Pt has other appointments on Monday, 7/27/20 and would like to schedule these appointments on that day if possible.    Thanks,  Katharine

## 2020-07-21 NOTE — ANESTHESIA PAT ROS NOTE
07/21/2020  Hilario Thomas is a 65 y.o., male.      Pre-op Assessment          Review of Systems         Anesthesia Assessment: Preoperative EQUATION    Planned Procedure: Procedure(s) (LRB):  ARTHROPLASTY,KNEE,TOTAL,FILOMENA COMPUTER-ASSISTED NAVIGATION-SAME DAY (Right)  Requested Anesthesia Type:Regional  Surgeon: Oli Centeno MD  Service: Orthopedics  Known or anticipated Date of Surgery:8/4/2020    Surgeon notes: reviewed    Previous anesthesia records:LEFT   5/5/20   ARTHROPLASTY,KNEE,TOTAL,USING COMPUTER-ASSISTED NAVIGATION SAME DAY   SPINAL, NERVE BLOCK    Last PCP note: within [facility] , Dr [place] 2/7/20    Other important co-morbidities: none      Tests already available:  EKG 2/7/20, Exercise Stress ECHO 6/7/16 (in media)               Plan:    Testing:  BMP, Hematology Profile and PT/INR   Pre-anesthesia  visit not required as last arthroplasty was 5/5/20; pt reports no problems with anesthesia for this surgery     Visit focus: n/a     Consultation:POC NP for clearance      Navigation: Tests to be scheduled.              Consults to be scheduled.             Results will be tracked by Preop Clinic.

## 2020-07-21 NOTE — PRE-PROCEDURE INSTRUCTIONS
Chart review; triage plan initiated.  Phone contact-medication reconciliation, instructed to stop vitamins, supplements and NSAIDs for one week prior to surgery. Informed pt that the POC  will contact him to scheduled appointments for lab and with the POC NP. Pt verbalized understanding.

## 2020-07-24 DIAGNOSIS — M17.11 PRIMARY OSTEOARTHRITIS OF RIGHT KNEE: Primary | ICD-10-CM

## 2020-07-24 NOTE — ASSESSMENT & PLAN NOTE
Most recent EKG 2/7/20: NSR  Stress Echo 2016: Normal systolic function; mild LVH  Denies CP/SOB; Not followed by cardiology.

## 2020-07-26 ENCOUNTER — PATIENT OUTREACH (OUTPATIENT)
Dept: ADMINISTRATIVE | Facility: OTHER | Age: 65
End: 2020-07-26

## 2020-07-26 NOTE — PROGRESS NOTES
Requested updates within Care Everywhere.  Patient's chart was reviewed for overdue HAMIDA topics.  Open case request for colonoscopy.  Immunizations reconciled.    Orders placed:n/a  Labs Linked:n/

## 2020-07-27 ENCOUNTER — INITIAL CONSULT (OUTPATIENT)
Dept: INTERNAL MEDICINE | Facility: CLINIC | Age: 65
End: 2020-07-27
Payer: COMMERCIAL

## 2020-07-27 ENCOUNTER — HOSPITAL ENCOUNTER (OUTPATIENT)
Dept: RADIOLOGY | Facility: HOSPITAL | Age: 65
Discharge: HOME | End: 2020-07-27
Attending: PHYSICIAN ASSISTANT
Payer: COMMERCIAL

## 2020-07-27 ENCOUNTER — OFFICE VISIT (OUTPATIENT)
Dept: ORTHOPEDICS | Facility: CLINIC | Age: 65
End: 2020-07-27
Payer: COMMERCIAL

## 2020-07-27 VITALS — BODY MASS INDEX: 28.55 KG/M2 | HEIGHT: 74 IN | WEIGHT: 222.44 LBS

## 2020-07-27 VITALS — HEIGHT: 74 IN | WEIGHT: 222 LBS | BODY MASS INDEX: 28.49 KG/M2

## 2020-07-27 VITALS
DIASTOLIC BLOOD PRESSURE: 70 MMHG | WEIGHT: 222 LBS | HEART RATE: 51 BPM | SYSTOLIC BLOOD PRESSURE: 140 MMHG | TEMPERATURE: 98 F | HEIGHT: 74 IN | OXYGEN SATURATION: 97 % | RESPIRATION RATE: 16 BRPM | BODY MASS INDEX: 28.49 KG/M2

## 2020-07-27 DIAGNOSIS — Z01.818 PRE-OP TESTING: ICD-10-CM

## 2020-07-27 DIAGNOSIS — R94.31 ABNORMAL ECG: ICD-10-CM

## 2020-07-27 DIAGNOSIS — M17.11 PRIMARY OSTEOARTHRITIS OF RIGHT KNEE: Primary | ICD-10-CM

## 2020-07-27 DIAGNOSIS — M17.0 PRIMARY OSTEOARTHRITIS OF BOTH KNEES: ICD-10-CM

## 2020-07-27 DIAGNOSIS — M17.11 PRIMARY OSTEOARTHRITIS OF RIGHT KNEE: ICD-10-CM

## 2020-07-27 DIAGNOSIS — Z96.652 STATUS POST TOTAL LEFT KNEE REPLACEMENT: ICD-10-CM

## 2020-07-27 DIAGNOSIS — Z82.49 FAMILY HISTORY OF ISCHEMIC HEART DISEASE: ICD-10-CM

## 2020-07-27 DIAGNOSIS — I49.9 CARDIAC ARRHYTHMIA, UNSPECIFIED CARDIAC ARRHYTHMIA TYPE: ICD-10-CM

## 2020-07-27 DIAGNOSIS — R60.9 EDEMA, UNSPECIFIED TYPE: ICD-10-CM

## 2020-07-27 PROCEDURE — 99024 POSTOP FOLLOW-UP VISIT: CPT | Mod: S$GLB,,, | Performed by: ORTHOPAEDIC SURGERY

## 2020-07-27 PROCEDURE — 99999 PR PBB SHADOW E&M-EST. PATIENT-LVL III: CPT | Mod: PBBFAC,,, | Performed by: PHYSICIAN ASSISTANT

## 2020-07-27 PROCEDURE — 99024 PR POST-OP FOLLOW-UP VISIT: ICD-10-PCS | Mod: S$GLB,,, | Performed by: ORTHOPAEDIC SURGERY

## 2020-07-27 PROCEDURE — 99499 NO LOS: ICD-10-PCS | Mod: S$GLB,,, | Performed by: PHYSICIAN ASSISTANT

## 2020-07-27 PROCEDURE — 73560 X-RAY EXAM OF KNEE 1 OR 2: CPT | Mod: TC,RT

## 2020-07-27 PROCEDURE — 99999 PR PBB SHADOW E&M-EST. PATIENT-LVL III: ICD-10-PCS | Mod: PBBFAC,,, | Performed by: PHYSICIAN ASSISTANT

## 2020-07-27 PROCEDURE — 99999 PR PBB SHADOW E&M-EST. PATIENT-LVL III: CPT | Mod: PBBFAC,,, | Performed by: ORTHOPAEDIC SURGERY

## 2020-07-27 PROCEDURE — 99214 OFFICE O/P EST MOD 30 MIN: CPT | Mod: S$GLB,,, | Performed by: NURSE PRACTITIONER

## 2020-07-27 PROCEDURE — 99214 PR OFFICE/OUTPT VISIT, EST, LEVL IV, 30-39 MIN: ICD-10-PCS | Mod: S$GLB,,, | Performed by: NURSE PRACTITIONER

## 2020-07-27 PROCEDURE — 99499 UNLISTED E&M SERVICE: CPT | Mod: S$GLB,,, | Performed by: PHYSICIAN ASSISTANT

## 2020-07-27 PROCEDURE — 99999 PR PBB SHADOW E&M-EST. PATIENT-LVL III: CPT | Mod: PBBFAC,,, | Performed by: NURSE PRACTITIONER

## 2020-07-27 PROCEDURE — 99999 PR PBB SHADOW E&M-EST. PATIENT-LVL III: ICD-10-PCS | Mod: PBBFAC,,, | Performed by: NURSE PRACTITIONER

## 2020-07-27 PROCEDURE — 3008F PR BODY MASS INDEX (BMI) DOCUMENTED: ICD-10-PCS | Mod: CPTII,S$GLB,, | Performed by: NURSE PRACTITIONER

## 2020-07-27 PROCEDURE — 3008F BODY MASS INDEX DOCD: CPT | Mod: CPTII,S$GLB,, | Performed by: NURSE PRACTITIONER

## 2020-07-27 PROCEDURE — 73560 XR KNEE 1 OR 2 VIEW RIGHT: ICD-10-PCS | Mod: 26,RT,, | Performed by: INTERNAL MEDICINE

## 2020-07-27 PROCEDURE — 73560 X-RAY EXAM OF KNEE 1 OR 2: CPT | Mod: 26,RT,, | Performed by: INTERNAL MEDICINE

## 2020-07-27 PROCEDURE — 99999 PR PBB SHADOW E&M-EST. PATIENT-LVL III: ICD-10-PCS | Mod: PBBFAC,,, | Performed by: ORTHOPAEDIC SURGERY

## 2020-07-27 RX ORDER — AMOXICILLIN 250 MG
1 CAPSULE ORAL 2 TIMES DAILY
Status: CANCELLED | OUTPATIENT
Start: 2020-07-27

## 2020-07-27 RX ORDER — ACETAMINOPHEN 500 MG
1000 TABLET ORAL
Status: CANCELLED | OUTPATIENT
Start: 2020-07-27

## 2020-07-27 RX ORDER — MIDAZOLAM HYDROCHLORIDE 1 MG/ML
1 INJECTION INTRAMUSCULAR; INTRAVENOUS EVERY 5 MIN PRN
Status: CANCELLED | OUTPATIENT
Start: 2020-07-27

## 2020-07-27 RX ORDER — CELECOXIB 100 MG/1
400 CAPSULE ORAL
Status: CANCELLED | OUTPATIENT
Start: 2020-07-27

## 2020-07-27 RX ORDER — PREGABALIN 25 MG/1
150 CAPSULE ORAL NIGHTLY
Status: CANCELLED | OUTPATIENT
Start: 2020-07-27

## 2020-07-27 RX ORDER — SODIUM CHLORIDE 0.9 % (FLUSH) 0.9 %
10 SYRINGE (ML) INJECTION
Status: CANCELLED | OUTPATIENT
Start: 2020-07-27

## 2020-07-27 RX ORDER — SODIUM CHLORIDE 9 MG/ML
INJECTION, SOLUTION INTRAVENOUS CONTINUOUS
Status: CANCELLED | OUTPATIENT
Start: 2020-07-27 | End: 2020-07-28

## 2020-07-27 RX ORDER — BISACODYL 10 MG
10 SUPPOSITORY, RECTAL RECTAL EVERY 12 HOURS PRN
Status: CANCELLED | OUTPATIENT
Start: 2020-07-27

## 2020-07-27 RX ORDER — SODIUM CHLORIDE 9 MG/ML
INJECTION, SOLUTION INTRAVENOUS
Status: CANCELLED | OUTPATIENT
Start: 2020-07-27

## 2020-07-27 RX ORDER — ASPIRIN 81 MG/1
81 TABLET ORAL 2 TIMES DAILY
Status: CANCELLED | OUTPATIENT
Start: 2020-07-27

## 2020-07-27 RX ORDER — POLYETHYLENE GLYCOL 3350 17 G/17G
17 POWDER, FOR SOLUTION ORAL DAILY
Status: CANCELLED | OUTPATIENT
Start: 2020-07-28

## 2020-07-27 RX ORDER — PREGABALIN 25 MG/1
150 CAPSULE ORAL
Status: CANCELLED | OUTPATIENT
Start: 2020-07-27

## 2020-07-27 RX ORDER — NALOXONE HCL 0.4 MG/ML
0.02 VIAL (ML) INJECTION
Status: CANCELLED | OUTPATIENT
Start: 2020-07-27

## 2020-07-27 RX ORDER — ACETAMINOPHEN 500 MG
1000 TABLET ORAL EVERY 6 HOURS
Status: CANCELLED | OUTPATIENT
Start: 2020-07-27 | End: 2020-07-29

## 2020-07-27 RX ORDER — ONDANSETRON 2 MG/ML
4 INJECTION INTRAMUSCULAR; INTRAVENOUS EVERY 8 HOURS PRN
Status: CANCELLED | OUTPATIENT
Start: 2020-07-27

## 2020-07-27 RX ORDER — FENTANYL CITRATE 50 UG/ML
25 INJECTION, SOLUTION INTRAMUSCULAR; INTRAVENOUS EVERY 5 MIN PRN
Status: CANCELLED | OUTPATIENT
Start: 2020-07-27

## 2020-07-27 RX ORDER — OXYCODONE HYDROCHLORIDE 5 MG/1
10 TABLET ORAL
Status: CANCELLED | OUTPATIENT
Start: 2020-07-27

## 2020-07-27 RX ORDER — FAMOTIDINE 20 MG/1
20 TABLET, FILM COATED ORAL 2 TIMES DAILY
Status: CANCELLED | OUTPATIENT
Start: 2020-07-27

## 2020-07-27 RX ORDER — OXYCODONE HYDROCHLORIDE 5 MG/1
5 TABLET ORAL
Status: CANCELLED | OUTPATIENT
Start: 2020-07-27

## 2020-07-27 RX ORDER — MUPIROCIN 20 MG/G
1 OINTMENT TOPICAL
Status: CANCELLED | OUTPATIENT
Start: 2020-07-27

## 2020-07-27 RX ORDER — LIDOCAINE HYDROCHLORIDE 10 MG/ML
1 INJECTION, SOLUTION EPIDURAL; INFILTRATION; INTRACAUDAL; PERINEURAL
Status: CANCELLED | OUTPATIENT
Start: 2020-07-27

## 2020-07-27 NOTE — LETTER
July 27, 2020      Oli Centeno MD  [address]  [place] [address]           Rolando elliot - Pre Op Consult  [address] [facility] [address]  Phone: [phone]          Patient: Hilario Thomas   MR Number: [id number]   YOB: 1955   Date of Visit: 7/27/2020       Dear Dr. Oli Centeno:    Thank you for referring Hilario Thomas to me for evaluation. Attached you will find relevant portions of my assessment and plan of care.    If you have questions, please do not hesitate to call me. I look forward to following Hilario Thomas along with you.    Sincerely,    Jewel Brock, NP    Enclosure  CC:  No Recipients    If you would like to receive this communication electronically, please contact [email or web address] or [phone] to request more information on [employer] Link access.    For providers and/or their staff who would like to refer a patient to [facility], please contact us through our one-stop-shop provider referral line, [facility], at [phone].    If you feel you have received this communication in error or would no longer like to receive these types of communications, please e-mail [email or web address]

## 2020-07-27 NOTE — PATIENT INSTRUCTIONS
Anesthesia: Regional Anesthesia    Youre scheduled for surgery. During surgery, youll receive medicine called anesthesia to keep you comfortable and pain-free. Your surgeon has decided that youll receive regional anesthesia. This sheet tells you what to expect with this type of anesthesia.  What is regional anesthesia?  Regional anesthesia numbs one region of your body. The anesthesia may be given around nerves or into veins in your arms, neck, or legs (nerve block or [place] block). Or it may be sent into the spinal fluid (spinal anesthesia) or into the space just outside the spinal fluid (epidural anesthesia). You may also be given sedatives to help you relax.  Nerve block or [place] block  A small area of the body, such as an arm or leg, can be numbed using a nerve block or Lynne block.  · Nerve block. During a nerve block, your skin is numbed. A needle is then inserted near nerves that serve the area to be numbed. Anesthetic is sent through the needle.  · IV regional or Lynne block. For this type of block, an IV line is put into a vein. The blood flow to the area to be numbed is blocked for a short time. Anesthetic is sent through the IV.  Spinal anesthesia  Spinal anesthesia numbs your body from about the waist down.  · Anesthetic is injected into the spinal fluid. This is a substance that surrounds the spinal cord in your spinal column. The anesthetic blocks pain traveling from the body to the brain.  · To receive the anesthetic, your skin is numbed at the injection site on your back.  · A needle is then inserted into the spinal space. Anesthetic is sent into the spinal fluid through the needle.  Epidural anesthesia  Epidural anesthesia is most commonly used during childbirth and may also be used after surgical procedures of the chest, belly, and legs.  · Anesthetic is injected into the epidural space. This is just outside the dural sac which contains the spinal fluid.  · To receive the anesthetic, your skin is  numbed at the injection site on your back.  · A needle is then inserted into the epidural space. Anesthetic is sent into the epidural space through the needle.  · A small flexible catheter may be attached to the needle and left in place. This allows for continuous injections or infusions of anesthetic.  Anesthesia tools and medicines that might be near you during your procedure  · Local anesthetic. This medicine is given through a needle numbs one region of your body.  · Electrocardiography leads (electrodes). These are used to record your heart rate and rhythm.  · Blood pressure cuff. A cuff is placed on your arm to keep track of your blood pressure.  · Pulse oximeter. This small clip is placed on the end of the finger. It measures your blood oxygen level.  · Sedatives. These medicines may be given through an IV. They help to relax you and keep you comfortable. You may stay awake or sleep lightly.  · Oxygen. You may be given oxygen through a facemask.  Risks and possible complications  Regional anesthesia carries some risks. These include:  · Nausea and vomiting  · Headache  · Backache  · Decreased blood pressure  · Allergic reaction to the anesthetic  · Ongoing numbness (rare)  · Irregular heartbeat (rare)  · Cardiac arrest (rare)   Date Last Reviewed: 12/1/2016  © 5263-7367 [employer]. [address] [place] [address], [place] [address]. All rights reserved. This information is not intended as a substitute for professional medical care. Always follow your healthcare professional's instructions.

## 2020-07-27 NOTE — PROGRESS NOTES
[facility] - Pre Op Consult  Progress Note    Patient Name: Hilario Thomas  MRN: [id number]  Date of Evaluation- 07/27/2020  PCP- Maximus Shearer MD    Future cases for Hilario Thomas [[id number]]     Case ID Status Date Time Terry Procedure Provider Location    [id number] [facility] 8/4/2020  9:37  ARTHROPLASTY,KNEE,TOTAL,FILOMENA COMPUTER-ASSISTED NAVIGATION-SAME DAY Oli Centeno MD [[id number]] [facility]          HPI:  This is a 65 y.o. male  who presents today for a preoperative evaulation in preparation for Orthopedic  surgery. Scheduled for right total knee arthroplasty.  States  surgery is indicated for right knee pain.   Patient is new to me.  Details of current problem:  Reports bilateral knee pain for many years. Recently had left knee surgery in May 2020.   Reports symptoms of right knee pain that is worse with certain movements. Other aggravating factors include: sleeping and stair climbing.   Relieving factors are Tylenol Arthritis prn and rest .   States pain rated 3/10 today. Does not use assistive devices for mobility.    The history has been obtained by speaking with the patient and reviewing the electronic medical record and/or outside health information.    Patient reports current health status to be Good.  Denies any new symptoms before surgery.       Subjective/ Objective:     Chief Complaint: Preoperative evaulation, perioperative medical management, and complication reduction plan.     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       Anesthesia issues: None  Difficulty mouth opening: No  Steroid use in the last 12 months:  Yes- knee injections      Family anesthesia difficulty: None       Active Cardiac Conditions: None    Revised Cardiac Risk Index Predictors: None       Family Hx of Thrombosis: None    Past Medical History Pertinent Negatives:   Diagnosis Date Noted    Anemia 07/27/2020    Asthma 07/27/2020     CHF (congestive heart failure) 07/27/2020    COPD (chronic obstructive pulmonary disease) 07/27/2020    Coronary artery disease 07/27/2020    Diabetes mellitus type I 07/27/2020    Diabetes mellitus, type 2 07/27/2020    Disorder of kidney and ureter 07/27/2020    GERD (gastroesophageal reflux disease) 07/27/2020    Hyperlipidemia 07/27/2020    Hypertension 07/27/2020    Myocardial infarction 07/27/2020    Seizures 07/27/2020    Stroke 07/27/2020    Thyroid disease 07/27/2020       Past Surgical History:   Procedure Laterality Date    KNEE SURGERY         Review of Systems   Constitutional: Negative for chills, fatigue, fever and unexpected weight change.   HENT: Negative for dental problem, hearing loss, postnasal drip, rhinorrhea, sore throat, tinnitus and trouble swallowing.    Eyes: Negative for photophobia, pain, discharge and visual disturbance.   Respiratory: Negative for apnea, cough, chest tightness, shortness of breath and wheezing.         STOP BANG risk factors:  Snoring  Male sex   Cardiovascular: Positive for leg swelling (right leg). Negative for chest pain and palpitations.   Gastrointestinal: Negative for abdominal pain, blood in stool, constipation, nausea and vomiting.        Denies Fatty liver, Hepatitis   Endocrine: Negative for cold intolerance, heat intolerance, polydipsia, polyphagia and polyuria.   Genitourinary: Negative for decreased urine volume, difficulty urinating, dysuria, frequency, hematuria and urgency.        Nocturia   Musculoskeletal: Negative for arthralgias, back pain, neck pain and neck stiffness.   Skin: Negative for rash and wound.   Allergic/Immunologic: Negative for immunocompromised state.   Neurological: Negative for dizziness, tremors, seizures, syncope, weakness, numbness and headaches.   Hematological: Negative for adenopathy. Does not bruise/bleed easily.   Psychiatric/Behavioral: Negative for confusion, hallucinations, sleep disturbance and suicidal  "ideas.              VITALS    Vitals - 1 value per visit 7/27/2020   SYSTOLIC 140   DIASTOLIC 70   PULSE 51   TEMPERATURE 98   RESPIRATIONS 16   SPO2 97   Weight (lb) 222   Weight (kg) 100.699   HEIGHT 6' 2"   BODY MASS INDEX 28.5       Physical Exam  Vitals signs reviewed.   Constitutional:       General: He is not in acute distress.     Appearance: He is well-developed.   HENT:      Head: Normocephalic.      Nose: Nose normal.      Mouth/Throat:      Pharynx: No oropharyngeal exudate.   Eyes:      General:         Right eye: No discharge.         Left eye: No discharge.      Conjunctiva/sclera: Conjunctivae normal.      Pupils: Pupils are equal, round, and reactive to light.   Neck:      Musculoskeletal: Normal range of motion. Pain with movement (lateral- left) present.      Thyroid: No thyromegaly.      Vascular: No carotid bruit or JVD.      Trachea: No tracheal deviation.   Cardiovascular:      Rate and Rhythm: Normal rate and regular rhythm. Occasional extrasystoles are present.     Pulses:           Carotid pulses are 2+ on the right side and 2+ on the left side.       Dorsalis pedis pulses are 2+ on the right side and 2+ on the left side.        Posterior tibial pulses are 2+ on the right side and 2+ on the left side.      Heart sounds: Normal heart sounds. No murmur.      Comments: trace edema  Pulmonary:      Effort: Pulmonary effort is normal. No respiratory distress.      Breath sounds: Normal breath sounds. No stridor. No wheezing, rhonchi or rales.   Abdominal:      General: Bowel sounds are normal. There is no distension.      Palpations: Abdomen is soft.      Tenderness: There is no guarding.   Musculoskeletal:      Right lower leg: Edema present.      Left lower leg: Edema present.   Lymphadenopathy:      Cervical: No cervical adenopathy.   Skin:     General: Skin is warm and dry.      Capillary Refill: Capillary refill takes less than 2 seconds.      Findings: No erythema or rash.   Neurological: "      Mental Status: He is alert and oriented to person, place, and time.      Coordination: Coordination normal.           Significant Labs:  Lab Results   Component Value Date    WBC 6.43 07/27/2020    HGB 15.4 07/27/2020    HCT 49.0 07/27/2020     07/27/2020    CHOL 163 02/10/2020    TRIG 41 02/10/2020    HDL 45 02/10/2020    ALT 17 02/10/2020    AST 20 02/10/2020     07/27/2020    K 4.9 07/27/2020     07/27/2020    CREATININE 0.9 07/27/2020    BUN 16 07/27/2020    CO2 28 07/27/2020    PSA 0.99 02/10/2020    INR 0.9 07/27/2020       Diagnostic Studies: No relevant studies.    EKG:   Results for orders placed or performed in visit on 02/07/20   EKG 12-lead    Collection Time: 02/07/20  2:29 PM    Narrative    Normal sinus rhythm  Within normal limits  When compared with ECG of 07-FEB-2020 14:29,  No significant change was found  Confirmed by MONA HERMAN MD ([id number]) on 2/7/2020 2:42:39 PM    Referred By: [email or web address]   SELF           Confirmed By:MONA HERMAN MD       2D ECHO:    Outside Stress Echo from 3/2016: In media        ARIANA:  No results found for this or any previous visit.         Revised Cardiac Risk Index   High -Risk Surgery  Intraperitoneal; Intrathoracic; suprainguinal vascular Yes- + 1 No- 0   History of Ischemic Heart Disease   (Hx of MI/positive exercise test/current chest pain due to ischemia/use of nitrate therapy/EKG with pathological Q waves) Yes- + 1 No- 0   History of CHF  (Pulmonary edema/bilateral rales or S3 gallop/PND/CXR showing pulmonary vascular redistribution) Yes- + 1 No- 0   History of CVA   (Prior stroke or TIA) Yes- + 1 No- 0   Pre-operative treatment with insulin Yes- + 1 No- 0   Pre-operative creatinine > 2mg/dl Yes- + 1 No- 0   Total: 0      Risk Status:  Estimated risk of cardiac complications after non-cardiac surgery using the Revised Cardiac Risk Index for Preoperative risk is 3.9 %      ARISCAT ([place]et) risk index: Intermediate    American Society of  Anesthesiologists Physical Status classification (ASA): 2    [facility] respiratory failure index: 0.5 %           No further cardiac workup needed prior to surgery.  Normal EKG in February 2020. No dizziness or syncope. Very [place]. Tolerated anesthesia in May 2020 without complications.                Assessment/Plan:     Abnormal ECG  Most recent EKG 2/7/20: NSR  Stress Echo 2016: Normal systolic function; mild LVH  Denies CP/SOB; Not followed by cardiology.     Primary osteoarthritis of both knees  Scheduled for right knee arthroplasty 8/4/20 with Dr. Centeno.    Edema  Avoid salt, NSAIDs, and elevate bilateral legs. No use of compression stockings.    Family history of ischemic heart disease  Denies CP/SOB at this time. Mother had MI in past; not aware of other family members with history of CAD.     Arrhythmia  Denies dizziness or syncope. Most recent EKG from 2/2020 was normal.        Discussion/Management of Perioperative Care    Thromboembolic prophylaxis (VTE) Care: Risk factors for thrombosis include: decreased mobility and surgical procedure.  I recommend prophylaxis of thromboembolism with the use of compression stockings/pneumatic devices, and/or pharmacologic agents. The benefits should outweigh the risks for pharmacologic prophylaxis in the perioperative period. I also encourage early ambulation if not contraindicated during the post-operative period.    To reduce the risk of pulmonary complications, prophylactic recommendations include: incentive spirometry use/deep breathing, early ambulation and pain control.      To reduce the risk of postoperative renal complications, I recommend the patient maintain adequate fluid volume status by drinking 2 liters of water daily.  Avoid/reduce NSAIDS and SUMMERS-2 inhibitors use as well as IV contrast for renal protection.    I recommend the use of appropriate prophylactic antibiotics to reduce the risk of surgical site  infections.    Delirium risk factors include decreased mobility. I recommend to avoid/reduce use of benzodiazepine use (not for patients who take on a regular basis), anticholinergics, Benadryl,  and agents that may cause postoperative serotonin syndrome.  Controlled pain can decrease the risk for postop delirium and since opioids are used for postoperative pain control, I suggest using the lowest dose for the shortest amount of time necessary for pain management.     I recommend to avoid/decrease the use of benzodiazepines, anticholinergics, and Benadryl in the perioperative period. I also recommend using opioids for the shortest period of time if possible.          This visit was focused on Preoperative evaluation, Perioperative Medical management, complication reduction plans. I suggest that the patient follows up with primary care or relevant sub specialists for ongoing health care.    I appreciate the opportunity to be involved in this patients care. Please feel free to contact me if there were any questions about this consultation.    Patient is optimized for surgery with Dr. Centeno.    Jewel Brock NP  Perioperative Medicine  [facility]

## 2020-07-27 NOTE — OUTPATIENT SUBJECTIVE & OBJECTIVE
Outpatient Subjective & Objective      Chief Complaint: Preoperative evaulation, perioperative medical management, and complication reduction plan.     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       Anesthesia issues: None  Difficulty mouth opening: No  Steroid use in the last 12 months:  Yes- knee injections      Family anesthesia difficulty: None       Active Cardiac Conditions: None    Revised Cardiac Risk Index Predictors: None       Family Hx of Thrombosis: None    Past Medical History Pertinent Negatives:   Diagnosis Date Noted    Anemia 07/27/2020    Asthma 07/27/2020    CHF (congestive heart failure) 07/27/2020    COPD (chronic obstructive pulmonary disease) 07/27/2020    Coronary artery disease 07/27/2020    Diabetes mellitus type I 07/27/2020    Diabetes mellitus, type 2 07/27/2020    Disorder of kidney and ureter 07/27/2020    GERD (gastroesophageal reflux disease) 07/27/2020    Hyperlipidemia 07/27/2020    Hypertension 07/27/2020    Myocardial infarction 07/27/2020    Seizures 07/27/2020    Stroke 07/27/2020    Thyroid disease 07/27/2020       Past Surgical History:   Procedure Laterality Date    KNEE SURGERY         Review of Systems   Constitutional: Negative for chills, fatigue, fever and unexpected weight change.   HENT: Negative for dental problem, hearing loss, postnasal drip, rhinorrhea, sore throat, tinnitus and trouble swallowing.    Eyes: Negative for photophobia, pain, discharge and visual disturbance.   Respiratory: Negative for apnea, cough, chest tightness, shortness of breath and wheezing.         STOP BANG risk factors:  Snoring  Male sex   Cardiovascular: Positive for leg swelling (right leg). Negative for chest pain and palpitations.   Gastrointestinal: Negative for abdominal pain, blood in stool, constipation, nausea and vomiting.        Denies Fatty liver, Hepatitis   Endocrine:  "Negative for cold intolerance, heat intolerance, polydipsia, polyphagia and polyuria.   Genitourinary: Negative for decreased urine volume, difficulty urinating, dysuria, frequency, hematuria and urgency.        Nocturia   Musculoskeletal: Negative for arthralgias, back pain, neck pain and neck stiffness.   Skin: Negative for rash and wound.   Allergic/Immunologic: Negative for immunocompromised state.   Neurological: Negative for dizziness, tremors, seizures, syncope, weakness, numbness and headaches.   Hematological: Negative for adenopathy. Does not bruise/bleed easily.   Psychiatric/Behavioral: Negative for confusion, hallucinations, sleep disturbance and suicidal ideas.              VITALS    Vitals - 1 value per visit 7/27/2020   SYSTOLIC 140   DIASTOLIC 70   PULSE 51   TEMPERATURE 98   RESPIRATIONS 16   SPO2 97   Weight (lb) 222   Weight (kg) 100.699   HEIGHT 6' 2"   BODY MASS INDEX 28.5       Physical Exam  Vitals signs reviewed.   Constitutional:       General: He is not in acute distress.     Appearance: He is well-developed.   HENT:      Head: Normocephalic.      Nose: Nose normal.      Mouth/Throat:      Pharynx: No oropharyngeal exudate.   Eyes:      General:         Right eye: No discharge.         Left eye: No discharge.      Conjunctiva/sclera: Conjunctivae normal.      Pupils: Pupils are equal, round, and reactive to light.   Neck:      Musculoskeletal: Normal range of motion. Pain with movement (lateral- left) present.      Thyroid: No thyromegaly.      Vascular: No carotid bruit or JVD.      Trachea: No tracheal deviation.   Cardiovascular:      Rate and Rhythm: Normal rate and regular rhythm. Occasional extrasystoles are present.     Pulses:           Carotid pulses are 2+ on the right side and 2+ on the left side.       Dorsalis pedis pulses are 2+ on the right side and 2+ on the left side.        Posterior tibial pulses are 2+ on the right side and 2+ on the left side.      Heart sounds: Normal " heart sounds. No murmur.      Comments: trace edema  Pulmonary:      Effort: Pulmonary effort is normal. No respiratory distress.      Breath sounds: Normal breath sounds. No stridor. No wheezing, rhonchi or rales.   Abdominal:      General: Bowel sounds are normal. There is no distension.      Palpations: Abdomen is soft.      Tenderness: There is no guarding.   Musculoskeletal:      Right lower leg: Edema present.      Left lower leg: Edema present.   Lymphadenopathy:      Cervical: No cervical adenopathy.   Skin:     General: Skin is warm and dry.      Capillary Refill: Capillary refill takes less than 2 seconds.      Findings: No erythema or rash.   Neurological:      Mental Status: He is alert and oriented to person, place, and time.      Coordination: Coordination normal.          Significant Labs:  Lab Results   Component Value Date    WBC 6.43 07/27/2020    HGB 15.4 07/27/2020    HCT 49.0 07/27/2020     07/27/2020    CHOL 163 02/10/2020    TRIG 41 02/10/2020    HDL 45 02/10/2020    ALT 17 02/10/2020    AST 20 02/10/2020     07/27/2020    K 4.9 07/27/2020     07/27/2020    CREATININE 0.9 07/27/2020    BUN 16 07/27/2020    CO2 28 07/27/2020    PSA 0.99 02/10/2020    INR 0.9 07/27/2020       Diagnostic Studies: No relevant studies.    EKG:   Results for orders placed or performed in visit on 02/07/20   EKG 12-lead    Collection Time: 02/07/20  2:29 PM    Narrative    Normal sinus rhythm  Within normal limits  When compared with ECG of 07-FEB-2020 14:29,  No significant change was found  Confirmed by MONA HERMAN MD ([id number]) on 2/7/2020 2:42:39 PM    Referred By: DEBORA   SELF           Confirmed By:MONA HERMAN MD       2D ECHO:    Outside Stress Echo from 3/2016: In media        ARIANA:  No results found for this or any previous visit.         Revised Cardiac Risk Index   High -Risk Surgery  Intraperitoneal; Intrathoracic; suprainguinal vascular Yes- + 1 No- 0   History of Ischemic Heart Disease    (Hx of MI/positive exercise test/current chest pain due to ischemia/use of nitrate therapy/EKG with pathological Q waves) Yes- + 1 No- 0   History of CHF  (Pulmonary edema/bilateral rales or S3 gallop/PND/CXR showing pulmonary vascular redistribution) Yes- + 1 No- 0   History of CVA   (Prior stroke or TIA) Yes- + 1 No- 0   Pre-operative treatment with insulin Yes- + 1 No- 0   Pre-operative creatinine > 2mg/dl Yes- + 1 No- 0   Total: 0      Risk Status:  Estimated risk of cardiac complications after non-cardiac surgery using the Revised Cardiac Risk Index for Preoperative risk is 3.9 %      ARISCAT (Canet) risk index: Intermediate    American Society of Anesthesiologists Physical Status classification (ASA): 2    [facility] respiratory failure index: 0.5 %           No further cardiac workup needed prior to surgery.  Normal EKG in February 2020. No dizziness or syncope. Very [place] with [place]. Tolerated anesthesia in May 2020 without complications.     Outpatient Subjective & Objective

## 2020-07-27 NOTE — HPI
This is a 65 y.o. male  who presents today for a preoperative evaulation in preparation for Orthopedic  surgery. Scheduled for right total knee arthroplasty.  States  surgery is indicated for right knee pain.   Patient is new to me.  Details of current problem:  Reports bilateral knee pain for many years. Recently had left knee surgery in May 2020.   Reports symptoms of right knee pain that is worse with certain movements. Other aggravating factors include: sleeping and stair climbing.   Relieving factors are Tylenol Arthritis prn and rest .   States pain rated 3/10 today. Does not use assistive devices for mobility.    The history has been obtained by speaking with the patient and reviewing the electronic medical record and/or outside health information.    Patient reports current health status to be Good.  Denies any new symptoms before surgery.

## 2020-07-27 NOTE — PROGRESS NOTES
Hilario Thomas is a 65 y.o. year old here today for a pre-operative visit in preparation for a Right total knee arthroplasty to be performed by Dr. Centeno on 8/4/2020.  he was last seen and treated in the clinic on 7/27/2020. he will be medically optimized by the pre op center. There has been no significant change in medical status since last visit. No fever, chills, malaise, or unexplained weight change.      Allergies, Medications, past medical and surgical history reviewed.    Focused examination performed.    Patient saw surgeon in clinic today. All questions answered. Patient encouraged to call with questions. Contact information given.     Pre, chacha, and post operative procedures and expectations discussed. Questions were answered. Hilario Thomas has been educated and is ready to proceed with surgery. Approximately 30 minutes was spent discussing surgical outcomes, plans, procedures pre, chacha, and post operative expections and care.  Surgical consent signed.    Hilario Thomas will contact us if there are any questions, concerns, or changes in medical status prior to surgery.       Joint class done during ortho clinic visit    COVID-19 test date: 8/1     patient will be scheduled with [facility].

## 2020-07-27 NOTE — PROGRESS NOTES
"Subjective:      Patient ID: Hilario Thomas is a 65 y.o. male.    Chief Complaint: Post-op Evaluation of the Left Knee and Pain of the Right Knee  Left knee doing well.  No issues.  PROMS completed  HPI  Hilario Thomas has right knee pain.  The pain has worsened. The symptoms have worsened to the point where it is interfering with his activities of daily living.  He has difficulty with stairs, getting dressed and getting in an out of a car.  The pain is located in the global aspect of the knee.  There  is not radiation.    There is associated stiffness.   There is catching and locking. The pain is described as achy. The pain is aggravated by standing and walking.  It is alleviated by rest on occasion.  There is not associated back pain.  His history, medications and problem list were reviewed.    Review of Systems   Constitution: Negative for chills, fever and night sweats.   HENT: Negative for hearing loss.    Eyes: Negative for blurred vision and double vision.   Cardiovascular: Negative for chest pain, claudication and leg swelling.   Respiratory: Negative for shortness of breath.    Endocrine: Negative for polydipsia, polyphagia and polyuria.   Hematologic/Lymphatic: Negative for adenopathy and bleeding problem. Does not bruise/bleed easily.   Skin: Negative for poor wound healing.   Musculoskeletal: Positive for joint pain.   Gastrointestinal: Negative for diarrhea and heartburn.   Genitourinary: Negative for bladder incontinence.   Neurological: Negative for focal weakness, headaches, numbness, paresthesias and sensory change.   Psychiatric/Behavioral: The patient is not nervous/anxious.    Allergic/Immunologic: Negative for persistent infections.         Objective:      Body mass index is 28.56 kg/m².  Vitals:    07/27/20 1021   Weight: 100.9 kg (222 lb 7.1 oz)   Height: 6' 2" (1.88 m)           General    Constitutional: He is oriented to person, place, and time. He appears well-developed and " well-nourished.   HENT:   Head: Normocephalic and atraumatic.   Eyes: EOM are normal.   Cardiovascular: Normal rate.    Pulmonary/Chest: Effort normal.   Neurological: He is alert and oriented to person, place, and time.   Psychiatric: He has a normal mood and affect. His behavior is normal.     General Musculoskeletal Exam   Gait: normal       Right Knee Exam     Inspection   Erythema: absent  Scars: absent  Swelling: present  Effusion: present  Deformity: absent  Bruising: absent    Tenderness   The patient is tender to palpation of the medial joint line and lateral joint line.    Crepitus   The patient has crepitus of the patella.    Range of Motion   Extension: 5   Flexion: 100     Tests   Ligament Examination Lachman: normal (-1 to 2mm)   MCL - Valgus: normal (0 to 2mm)  LCL - Varus: normal  Patella   Passive Patellar Tilt: neutral    Other   Popliteal (Baker's) Cyst: present  Sensation: normal    Left Knee Exam     Inspection   Erythema: absent  Scars: absent  Swelling: absent  Effusion: absent  Deformity: absent  Bruising: absent    Tenderness   The patient is experiencing no tenderness.     Range of Motion   Extension: 0   Flexion: 130     Tests   Stability Lachman: normal (-1 to 2mm)   MCL - Valgus: normal (0 to 2mm)  LCL - Varus: normal (0 to 2mm)  Patella   Passive Patellar Tilt: neutral    Other   Sensation: normal    Muscle Strength   Right Lower Extremity   Hip Abduction: 5/5   Quadriceps:  5/5   Hamstrin/5   Left Lower Extremity   Hip Abduction: 5/5   Quadriceps:  5/5   Hamstrin/5     Reflexes     Left Side  Quadriceps:  2+    Right Side   Quadriceps:  2+    Vascular Exam     Right Pulses  Dorsalis Pedis:      2+          Left Pulses  Dorsalis Pedis:      2+          Edema  Right Lower Leg: absent  Left Lower Leg: absent              Assessment:       Encounter Diagnoses   Name Primary?    Primary osteoarthritis of right knee Yes    Status post total left knee replacement 2020            Plan:       Hilario was seen today for post-op evaluation and pain.    Diagnoses and all orders for this visit:    Primary osteoarthritis of right knee    Status post total left knee replacement 5/5/2020        Treatment options were discussed. The surgical process of robotically assisted medial unicompartmental right knee replacement was discussed in detail with the patient including a detailed discussion of the procedure itself (including visual model, x-ray review, and literature review). The typical perioperative and post-operative course was discussed and perioperative risks were discussed to the patient's satisfaction.  Risks and complications discussed included but were not limited to the risks of anesthetic complications, infection, bleeding, wound healing complications, stiffness, aseptic loosening, instability, limb length inequality, neurologic dysfunction including numbness and weakness, additional surgery,  DVT, pulmonary embolism, perioperative medical risks (cardiac, pulmonary, renal, neurologic), and death and the patient elects to proceed.  The patient should get medically cleared.     ASA for DVT prophylaxis    Same Day Protocol

## 2020-07-27 NOTE — H&P
CC: Right knee pain    Hilario Thomas is a 65 y.o. male with history of Right knee pain. Pain is worse with activity and weight bearing.  Patient has experienced interference of activities of daily living due to decreased range of motion and an increase in joint pain and swelling.  Patient has failed non-operative treatment including NSAIDs, corticosteroid injections, viscosupplement injections, and activity modification.  Hilario Thomas currently ambulates independently.  Pt had L TKA 5/5/2020 and did well.    Relevant medical conditions of significance in perioperative period:    Past Medical History:   Diagnosis Date    Abnormal ECG 5/31/2016 5/31/2016: ECG: Inferior Q waves.    Arthritis     Family history of ischemic heart disease 5/31/2016    Mother: Age 55: Myocardial infarction.       Past Surgical History:   Procedure Laterality Date    KNEE SURGERY         Family History   Problem Relation Age of Onset    Heart attack Mother     Diabetes Father     COPD Father     Diabetes Sister        Review of patient's allergies indicates:  No Known Allergies      Current Outpatient Medications:     acetaminophen (TYLENOL) 650 MG TbSR, Take 1 tablet (650 mg total) by mouth every 8 (eight) hours as needed., Disp: 120 tablet, Rfl: 0    ascorbic acid (VITAMIN C ORAL), Take by mouth. Hold for 1 week prior to surgery, Disp: , Rfl:     ergocalciferol, vitamin D2, (VITAMIN D ORAL), Take by mouth. Hold for 1 week prior to surgery, Disp: , Rfl:     Review of Systems:  Constitutional: no fever or chills  Eyes: no visual changes  ENT: no nasal congestion or sore throat  Respiratory: no cough or shortness of breath  Cardiovascular: no chest pain or palpitations  Gastrointestinal: no nausea or vomiting, tolerating diet  Genitourinary: no hematuria or dysuria  Integument/Breast: no rash or pruritis  Hematologic/Lymphatic: no easy bruising or lymphadenopathy  Musculoskeletal: positive for knee pain  Neurological:  "no seizures or tremors  Behavioral/Psych: no auditory or visual hallucinations  Endocrine: no heat or cold intolerance    PE:  Ht 6' 2" (1.88 m)   Wt 100.7 kg (222 lb)   BMI 28.50 kg/m²   General: Pleasant, cooperative, NAD   Gait: antalgic  HEENT: NCAT, sclera nonicteric   Lungs: Respirations clear bilaterally; equal and unlabored.   CV: S1S2; 2+ bilateral upper and lower extremity pulses.   Skin: Intact throughout with no rashes, erythema, or lesions  Extremities: No LE edema,  no erythema or warmth of the skin in either lower extremity.    Right knee exam:  Knee Range of Motion:0-125 active, pain with passive range of motion  Effusion:none  Condition of skin:intact  Location of tenderness:Medial joint line   Strength:5 of 5 quadriceps strength and 5 of 5 hamstring strength  Stability: stable to testing    Hip Examination: painless PROM of hip     Radiographs: Radiographs reveal advanced degenerative changes including subchondral cyst formation, subchondral sclerosis, osteophyte formation, joint space narrowing.     Knee Alignment:  Significiant varus    Diagnosis: osteoarthritis Right knee    Plan: Right total knee arthroplasty    Due to the serious nature of total joint infection and the high prevalence of community acquired MRSA, vancomycin will be used perioperatively.            "

## 2020-07-27 NOTE — ASSESSMENT & PLAN NOTE
Denies CP/SOB at this time. Mother had MI in past; not aware of other family members with history of CAD.

## 2020-07-27 NOTE — DISCHARGE INSTRUCTIONS
Your surgery has been scheduled for:8/4    You should report to:    ____[facility], located on the [facility] side of the first floor of the           [facility] ([phone])    ____[facility], located on the [facility] side of the            Second floor of the [facility] ([phone])    ___[facility]     Please Note   - Tell your doctor if you take Aspirin, products containing Aspirin, herbal medications or blood thinners, such as Coumadin, Ticlid, or Plavix.  (Consult your provider regarding holding or stopping before surgery).  - Arrange for someone to drive you home following surgery.  You will not be allowed to leave the surgical facility alone or drive yourself home following sedation and anesthesia.    Before Surgery  - Stop taking all vitamins/ herbal medications 7 days prior to surgery  - No Motrin/Advil (Ibuprofen) 7 days before surgery  - No Aleve (Naproxen) 7 days before surgery  - Stop taking blood thinners_7_days before surgery  - No Goody's/BC  Powder 7 days before surgery  - Refrain from drinking alcoholic beverages for 24 hours before and after surgery  - Stop or limit smoking 14 days before surgery  - You may take Tylenol for pain    Night before Surgery-NOTHING TO  DRINK OR EAT AFTER MIDNIGHT    - Take a shower or bath (shower is recommended).  Bathe with Hibiclens soap or an antibacterial soap from the neck down.  If not supplied by your surgeon, hibiclens soap will need to be purchased over the counter in pharmacy.  Rinse soap off thoroughly.  - Shampoo your hair with your regular shampoo    The Day of Surgery  - Take another bath or shower with hibiclens or any antibacterial soap, to reduce the chance of infection.  - Take heart and blood pressure medications with a small sip of water, as advised by the perioperative team.  - Do not take fluid pills  - You may brush your teeth and rinse your  mouth, but do not swallow any additional water.   - Do not apply perfumes, powder, body lotions or deodorant on the day of surgery.  - Nail polish should be removed.  - Do not wear makeup or moisturizer.  - Wear comfortable clothes, such as a button front shirt and loose fitting pants.  - Leave all jewelry, including body piercings, and valuables at home.    - Bring any devices you will neeed after surgery such as crutches or canes.  - If you have sleep apnea, please bring your CPAP machine.    In the event that your physical condition changes including the onset of a cold or respiratory illness, or if you have to delay or cancel your surgery, please notify your surgeon.  Anesthesia: Regional Anesthesia    Youre scheduled for surgery. During surgery, youll receive medicine called anesthesia to keep you comfortable and pain-free. Your surgeon has decided that youll receive regional anesthesia. This sheet tells you what to expect with this type of anesthesia.  What is regional anesthesia?  Regional anesthesia numbs one region of your body. The anesthesia may be given around nerves or into veins in your arms, neck, or legs (nerve block or LaSalle block). Or it may be sent into the spinal fluid (spinal anesthesia) or into the space just outside the spinal fluid (epidural anesthesia). You may also be given sedatives to help you relax.  Nerve block or [place]Salle block  A small area of the body, such as an arm or leg, can be numbed using a nerve block or Lynne block.  · Nerve block. During a nerve block, your skin is numbed. A needle is then inserted near nerves that serve the area to be numbed. Anesthetic is sent through the needle.  · IV regional or LaSalle block. For this type of block, an IV line is put into a vein. The blood flow to the area to be numbed is blocked for a short time. Anesthetic is sent through the IV.  Spinal anesthesia  Spinal anesthesia numbs your body from about the waist down.  · Anesthetic is injected into  the spinal fluid. This is a substance that surrounds the spinal cord in your spinal column. The anesthetic blocks pain traveling from the body to the brain.  · To receive the anesthetic, your skin is numbed at the injection site on your back.  · A needle is then inserted into the spinal space. Anesthetic is sent into the spinal fluid through the needle.  Epidural anesthesia  Epidural anesthesia is most commonly used during childbirth and may also be used after surgical procedures of the chest, belly, and legs.  · Anesthetic is injected into the epidural space. This is just outside the dural sac which contains the spinal fluid.  · To receive the anesthetic, your skin is numbed at the injection site on your back.  · A needle is then inserted into the epidural space. Anesthetic is sent into the epidural space through the needle.  · A small flexible catheter may be attached to the needle and left in place. This allows for continuous injections or infusions of anesthetic.  Anesthesia tools and medicines that might be near you during your procedure  · Local anesthetic. This medicine is given through a needle numbs one region of your body.  · Electrocardiography leads (electrodes). These are used to record your heart rate and rhythm.  · Blood pressure cuff. A cuff is placed on your arm to keep track of your blood pressure.  · Pulse oximeter. This small clip is placed on the end of the finger. It measures your blood oxygen level.  · Sedatives. These medicines may be given through an IV. They help to relax you and keep you comfortable. You may stay awake or sleep lightly.  · Oxygen. You may be given oxygen through a facemask.  Risks and possible complications  Regional anesthesia carries some risks. These include:  · Nausea and vomiting  · Headache  · Backache  · Decreased blood pressure  · Allergic reaction to the anesthetic  · Ongoing numbness (rare)  · Irregular heartbeat (rare)  · Cardiac arrest (rare)   Date Last  Reviewed: 12/1/2016  © 6239-2534 [employer] [address] [place] [address], [place] [address]. All rights reserved. This information is not intended as a substitute for professional medical care. Always follow your healthcare professional's instructions.

## 2020-07-28 ENCOUNTER — CLINICAL SUPPORT (OUTPATIENT)
Dept: REHABILITATION | Facility: HOSPITAL | Age: 65
End: 2020-07-28
Payer: COMMERCIAL

## 2020-07-28 DIAGNOSIS — M25.562 CHRONIC PAIN OF LEFT KNEE: ICD-10-CM

## 2020-07-28 DIAGNOSIS — G89.29 CHRONIC PAIN OF LEFT KNEE: ICD-10-CM

## 2020-07-28 DIAGNOSIS — R26.2 DIFFICULTY WALKING: ICD-10-CM

## 2020-07-28 PROCEDURE — 97110 THERAPEUTIC EXERCISES: CPT

## 2020-07-28 PROCEDURE — 97112 NEUROMUSCULAR REEDUCATION: CPT

## 2020-07-28 NOTE — PROGRESS NOTES
".    Physical Therapy Daily Treatment Note     Name: Hilario Thomas  Clinic Number: [id number]    Therapy Diagnosis:   Encounter Diagnoses   Name Primary?    Chronic pain of left knee     Difficulty walking      Physician: Mariel Quintanilla PA-C    Visit Date: 7/28/2020  Physician Orders: PT Eval and Treat   Medical Diagnosis from Referral:   M17.12 (ICD-10-CM) - Primary osteoarthritis of left knee   Z96.652 (ICD-10-CM) - Status post total left knee replacement      Evaluation Date: 5/7/2020  Authorization Period Expiration: 12/31/2020  Plan of Care Expiration: 8/14/2020  Visit # / Visits authorized: 18/20     Time In: 1315  Time Out: 1405  Total Billable Time: 45 minutes    Precautions: Standard    Subjective     Pt reports: He will be having a TKA on the right side next Tuesday. He will be coming back for PT 2 days following for his evaluation. No left knee pain.    He was compliant with home exercise program.  Response to previous treatment: No adverse effects  Functional change: Improved ability to complete ADLs    Pain: 0/10  Location: left knee      Objective     Knee Range of Motion:    Right  Left    Flexion 119 125   Extension 0 0     Hilario received therapeutic exercises to develop strength, endurance and ROM for 15 minutes including:    Elliptical lvl 2 for quadriceps strength and muscle endurance   Shuttle DLSQ (3.5 bands) 2 x 15  Shuttle SLSQ (1.5 bands) 2 x 15 angelica   S/L clams c green tb 10 x 10" angelica - np  LAQ 3 x 10 on knee extension machine w/ 5lbs - np  Heel raise 3 x 10 w/ 5" hold - np  Bridge 10 x 10"    Hilario received Neuromusclar Re-education for 30 minutes to improve hip control with functional mobility including the following:    SLS on hard foam 2 x 30" angelica  Seated SLR 2 x 10 angelica with emphasis on TKE  Step up with 12" box  Squat training with 24 in box an GTB    Hilario received the following manual therapy techniques: Joint mobilizations were applied to the: left knee for 0 minutes " including:    Hilario participated in dynamic functional therapeutic activities to improve functional performance for 0 minutes including:      Home Exercises Provided and Patient Education Provided     Education provided:   - Importance of achieving full knee extension  - Updated HEP to include single leg balance    Written Home Exercises Provided: Patient instructed to cont prior HEP.  Exercises were reviewed and Hilario was able to demonstrate them prior to the end of the session.  Hilario demonstrated good  understanding of the education provided.     See EMR under Patient Instructions for exercises provided prior visit.    Assessment     Patient will be having a R TKA and will be evaluated 2 days post op. He tolerated treatment well today.    Hilario is progressing well towards his goals.   Pt prognosis is Excellent.     Pt will continue to benefit from skilled outpatient physical therapy to address the deficits listed in the problem list box on initial evaluation, provide pt/family education and to maximize pt's level of independence in the home and community environment.     Pt's spiritual, cultural and educational needs considered and pt agreeable to plan of care and goals.    Anticipated Barriers for therapy: covid-19 and transportation    GOALS: Short Term Goals:  6 weeks  1.Report decreased knee pain  < /=  2/10  to increase tolerance for sleeping at night and completing ADLs (met)  2. Increase knee ROM to 120 flexion and full extension in order to be able to perform ADLs without difficulty. (progressing, not met)  3. Increase strength by 1/3 MMT grade in quads and gluts  to increase tolerance for ADL and work activities. (progressing, not met)  4. Pt to tolerate HEP to improve ROM and independence with ADL's (progressing, not met)     Long Term Goals: 12 weeks  1.Report decreased knee pain < / = 0/10  to increase tolerance for ambulation 3 miles without assistive device in community (progressing, not  met)  2.Patient goal: return to hiking and ambulating at night without knee pain (progressing, not met)  3.Increase strength to >/= 4+/5 in quad and gluts  to increase tolerance for ADL and work activities. (progressing, not met)  4. Pt will perform 20 squats without L knee pain to improve functional mobility and reduce pain with daily tasks like in and out of the car (progressing, not met)    Plan   Plan of care Certification: 5/7/2020 to 8/14/2020.    Progress single leg exercises and balance activities as tolerated.    Outpatient Physical Therapy 2 to 3 times weekly for 10 weeks to include the following interventions: Gait Training, Manual Therapy, Moist Heat/ Ice, Neuromuscular Re-ed, Patient Education, Self Care, Therapeutic Activites and Therapeutic Exercise.     Luigi Hope, PT, DPT

## 2020-07-29 ENCOUNTER — PATIENT MESSAGE (OUTPATIENT)
Dept: ORTHOPEDICS | Facility: CLINIC | Age: 65
End: 2020-07-29

## 2020-07-29 DIAGNOSIS — M17.10 UNILATERAL PRIMARY OSTEOARTHRITIS, UNSPECIFIED KNEE: ICD-10-CM

## 2020-07-31 ENCOUNTER — TELEPHONE (OUTPATIENT)
Dept: ORTHOPEDICS | Facility: CLINIC | Age: 65
End: 2020-07-31

## 2020-07-31 ENCOUNTER — HOSPITAL ENCOUNTER (OUTPATIENT)
Dept: RADIOLOGY | Facility: HOSPITAL | Age: 65
Discharge: HOME | End: 2020-07-31
Attending: ORTHOPAEDIC SURGERY
Payer: COMMERCIAL

## 2020-07-31 DIAGNOSIS — M17.10 UNILATERAL PRIMARY OSTEOARTHRITIS, UNSPECIFIED KNEE: ICD-10-CM

## 2020-07-31 DIAGNOSIS — Z96.651 STATUS POST TOTAL RIGHT KNEE REPLACEMENT: Primary | ICD-10-CM

## 2020-07-31 PROCEDURE — 73700 CT LOWER EXTREMITY W/O DYE: CPT | Mod: 26,RT,, | Performed by: RADIOLOGY

## 2020-07-31 PROCEDURE — 73700 CT KNEE WITHOUT CONTRAST RIGHT: ICD-10-PCS | Mod: 26,RT,, | Performed by: RADIOLOGY

## 2020-07-31 PROCEDURE — 73700 CT LOWER EXTREMITY W/O DYE: CPT | Mod: TC,RT

## 2020-07-31 RX ORDER — ASPIRIN 81 MG/1
81 TABLET ORAL 2 TIMES DAILY
Qty: 60 TABLET | Refills: 0 | Status: SHIPPED | OUTPATIENT
Start: 2020-07-31 | End: 2021-04-01

## 2020-07-31 RX ORDER — DEXTROMETHORPHAN HYDROBROMIDE, GUAIFENESIN 5; 100 MG/5ML; MG/5ML
650 LIQUID ORAL EVERY 8 HOURS PRN
Qty: 120 TABLET | Refills: 0 | Status: SHIPPED | OUTPATIENT
Start: 2020-07-31

## 2020-07-31 RX ORDER — OXYCODONE HYDROCHLORIDE 5 MG/1
TABLET ORAL
Qty: 50 TABLET | Refills: 0 | Status: SHIPPED | OUTPATIENT
Start: 2020-07-31 | End: 2020-08-18

## 2020-07-31 RX ORDER — CELECOXIB 200 MG/1
200 CAPSULE ORAL DAILY
Qty: 30 CAPSULE | Refills: 0 | Status: SHIPPED | OUTPATIENT
Start: 2020-07-31 | End: 2020-09-03

## 2020-07-31 RX ORDER — DOCUSATE SODIUM 100 MG/1
100 CAPSULE, LIQUID FILLED ORAL 2 TIMES DAILY PRN
Qty: 60 CAPSULE | Refills: 0 | Status: SHIPPED | OUTPATIENT
Start: 2020-07-31 | End: 2021-03-25

## 2020-07-31 NOTE — TELEPHONE ENCOUNTER
I called the patient regarding his CT scan. I told him that it is still scheduled for today and the prior authorization staff will work on getting it approved in time for the CT Scan. The patient verbalized understanding and has no further questions    ----- Message from Kaden Villafuerte sent at 7/31/2020 10:12 AM CDT -----  Contact: self    Pt has question concerning his CT scan today needs to know if it was approved      Contact  [phone]

## 2020-08-03 ENCOUNTER — ANESTHESIA EVENT (OUTPATIENT)
Dept: SURGERY | Facility: HOSPITAL | Age: 65
End: 2020-08-03
Payer: COMMERCIAL

## 2020-08-04 ENCOUNTER — HOSPITAL ENCOUNTER (OUTPATIENT)
Facility: HOSPITAL | Age: 65
Discharge: HOME | End: 2020-08-04
Attending: ORTHOPAEDIC SURGERY | Admitting: ORTHOPAEDIC SURGERY
Payer: COMMERCIAL

## 2020-08-04 ENCOUNTER — ANESTHESIA (OUTPATIENT)
Dept: SURGERY | Facility: HOSPITAL | Age: 65
End: 2020-08-04
Payer: COMMERCIAL

## 2020-08-04 VITALS
DIASTOLIC BLOOD PRESSURE: 80 MMHG | WEIGHT: 222 LBS | HEIGHT: 74 IN | OXYGEN SATURATION: 96 % | SYSTOLIC BLOOD PRESSURE: 122 MMHG | RESPIRATION RATE: 16 BRPM | TEMPERATURE: 97 F | HEART RATE: 74 BPM | BODY MASS INDEX: 28.49 KG/M2

## 2020-08-04 DIAGNOSIS — M17.11 PRIMARY OSTEOARTHRITIS OF RIGHT KNEE: ICD-10-CM

## 2020-08-04 DIAGNOSIS — Z96.651 STATUS POST TOTAL RIGHT KNEE REPLACEMENT: Primary | ICD-10-CM

## 2020-08-04 PROCEDURE — 25000003 PHARM REV CODE 250: Performed by: PHYSICIAN ASSISTANT

## 2020-08-04 PROCEDURE — 37000008 HC ANESTHESIA 1ST 15 MINUTES: Performed by: ORTHOPAEDIC SURGERY

## 2020-08-04 PROCEDURE — 76942 ECHO GUIDE FOR BIOPSY: CPT | Mod: 26,,, | Performed by: ANESTHESIOLOGY

## 2020-08-04 PROCEDURE — 63600175 PHARM REV CODE 636 W HCPCS: Performed by: PHYSICIAN ASSISTANT

## 2020-08-04 PROCEDURE — 99900035 HC TECH TIME PER 15 MIN (STAT)

## 2020-08-04 PROCEDURE — D9220A PRA ANESTHESIA: ICD-10-PCS | Mod: ANES,,, | Performed by: ANESTHESIOLOGY

## 2020-08-04 PROCEDURE — 76942 PR U/S GUIDANCE FOR NEEDLE GUIDANCE: ICD-10-PCS | Mod: 26,,, | Performed by: ANESTHESIOLOGY

## 2020-08-04 PROCEDURE — 64450 NJX AA&/STRD OTHER PN/BRANCH: CPT | Performed by: ANESTHESIOLOGY

## 2020-08-04 PROCEDURE — C1769 GUIDE WIRE: HCPCS | Performed by: ORTHOPAEDIC SURGERY

## 2020-08-04 PROCEDURE — 64448 NJX AA&/STRD FEM NRV NFS IMG: CPT | Performed by: ANESTHESIOLOGY

## 2020-08-04 PROCEDURE — C1713 ANCHOR/SCREW BN/BN,TIS/BN: HCPCS | Performed by: ORTHOPAEDIC SURGERY

## 2020-08-04 PROCEDURE — 64448 PR NERVE BLOCK INJ, ANES/STEROID, FEMORAL, CONT INFUSION, INCL IMAG GUIDANCE: ICD-10-PCS | Mod: 59,RT,, | Performed by: ANESTHESIOLOGY

## 2020-08-04 PROCEDURE — 97535 SELF CARE MNGMENT TRAINING: CPT

## 2020-08-04 PROCEDURE — 71000015 HC POSTOP RECOV 1ST HR: Performed by: ORTHOPAEDIC SURGERY

## 2020-08-04 PROCEDURE — 27201423 OPTIME MED/SURG SUP & DEVICES STERILE SUPPLY: Performed by: ORTHOPAEDIC SURGERY

## 2020-08-04 PROCEDURE — 71000016 HC POSTOP RECOV ADDL HR: Performed by: ORTHOPAEDIC SURGERY

## 2020-08-04 PROCEDURE — 71000039 HC RECOVERY, EACH ADD'L HOUR: Performed by: ORTHOPAEDIC SURGERY

## 2020-08-04 PROCEDURE — D9220A PRA ANESTHESIA: Mod: ANES,,, | Performed by: ANESTHESIOLOGY

## 2020-08-04 PROCEDURE — 36000710: Performed by: ORTHOPAEDIC SURGERY

## 2020-08-04 PROCEDURE — 64450 PR NERVE BLOCK INJ, ANES/STEROID, OTHER PERIPHERAL: ICD-10-PCS | Mod: 59,RT,, | Performed by: ANESTHESIOLOGY

## 2020-08-04 PROCEDURE — D9220A PRA ANESTHESIA: ICD-10-PCS | Mod: CRNA,,, | Performed by: NURSE ANESTHETIST, CERTIFIED REGISTERED

## 2020-08-04 PROCEDURE — 27200750 HC INSULATED NEEDLE/ STIMUPLEX: Performed by: ANESTHESIOLOGY

## 2020-08-04 PROCEDURE — C1751 CATH, INF, PER/CENT/MIDLINE: HCPCS | Performed by: ANESTHESIOLOGY

## 2020-08-04 PROCEDURE — 20985 CPTR-ASST DIR MS PX: CPT | Mod: ,,, | Performed by: ORTHOPAEDIC SURGERY

## 2020-08-04 PROCEDURE — 25000003 PHARM REV CODE 250: Performed by: NURSE ANESTHETIST, CERTIFIED REGISTERED

## 2020-08-04 PROCEDURE — 63600175 PHARM REV CODE 636 W HCPCS: Performed by: NURSE ANESTHETIST, CERTIFIED REGISTERED

## 2020-08-04 PROCEDURE — D9220A PRA ANESTHESIA: Mod: CRNA,,, | Performed by: NURSE ANESTHETIST, CERTIFIED REGISTERED

## 2020-08-04 PROCEDURE — 27100025 HC TUBING, SET FLUID WARMER: Performed by: ANESTHESIOLOGY

## 2020-08-04 PROCEDURE — 97165 OT EVAL LOW COMPLEX 30 MIN: CPT

## 2020-08-04 PROCEDURE — 76942 ECHO GUIDE FOR BIOPSY: CPT | Performed by: ANESTHESIOLOGY

## 2020-08-04 PROCEDURE — 64450 NJX AA&/STRD OTHER PN/BRANCH: CPT | Mod: 59,RT,, | Performed by: ANESTHESIOLOGY

## 2020-08-04 PROCEDURE — 94761 N-INVAS EAR/PLS OXIMETRY MLT: CPT

## 2020-08-04 PROCEDURE — 36000711: Performed by: ORTHOPAEDIC SURGERY

## 2020-08-04 PROCEDURE — 63600175 PHARM REV CODE 636 W HCPCS: Performed by: ANESTHESIOLOGY

## 2020-08-04 PROCEDURE — 94799 UNLISTED PULMONARY SVC/PX: CPT

## 2020-08-04 PROCEDURE — 25000003 PHARM REV CODE 250: Performed by: ANESTHESIOLOGY

## 2020-08-04 PROCEDURE — 97116 GAIT TRAINING THERAPY: CPT

## 2020-08-04 PROCEDURE — 97161 PT EVAL LOW COMPLEX 20 MIN: CPT

## 2020-08-04 PROCEDURE — C1776 JOINT DEVICE (IMPLANTABLE): HCPCS | Performed by: ORTHOPAEDIC SURGERY

## 2020-08-04 PROCEDURE — 37000009 HC ANESTHESIA EA ADD 15 MINS: Performed by: ORTHOPAEDIC SURGERY

## 2020-08-04 PROCEDURE — 27447 TOTAL KNEE ARTHROPLASTY: CPT | Mod: RT,,, | Performed by: ORTHOPAEDIC SURGERY

## 2020-08-04 PROCEDURE — 64448 NJX AA&/STRD FEM NRV NFS IMG: CPT | Mod: 59,RT,, | Performed by: ANESTHESIOLOGY

## 2020-08-04 PROCEDURE — 27447 PR TOTAL KNEE ARTHROPLASTY: ICD-10-PCS | Mod: RT,,, | Performed by: ORTHOPAEDIC SURGERY

## 2020-08-04 PROCEDURE — 71000033 HC RECOVERY, INTIAL HOUR: Performed by: ORTHOPAEDIC SURGERY

## 2020-08-04 PROCEDURE — S0020 INJECTION, BUPIVICAINE HYDRO: HCPCS | Performed by: ANESTHESIOLOGY

## 2020-08-04 PROCEDURE — S0028 INJECTION, FAMOTIDINE, 20 MG: HCPCS | Performed by: NURSE ANESTHETIST, CERTIFIED REGISTERED

## 2020-08-04 PROCEDURE — 94770 HC EXHALED C02 TEST: CPT

## 2020-08-04 PROCEDURE — 20985 PR CPTR-ASST SURGICAL NAVIGATION IMAGE-LESS: ICD-10-PCS | Mod: ,,, | Performed by: ORTHOPAEDIC SURGERY

## 2020-08-04 DEVICE — FEMORAL CRUC RTN CEM SZ 6 RT: Type: IMPLANTABLE DEVICE | Site: KNEE | Status: FUNCTIONAL

## 2020-08-04 DEVICE — CEMENT BONE SURG SMPLX P RADPQ: Type: IMPLANTABLE DEVICE | Site: KNEE | Status: FUNCTIONAL

## 2020-08-04 DEVICE — PIN BONE 4X110MM: Type: IMPLANTABLE DEVICE | Site: KNEE | Status: FUNCTIONAL

## 2020-08-04 DEVICE — IMPLANTABLE DEVICE: Type: IMPLANTABLE DEVICE | Site: KNEE | Status: FUNCTIONAL

## 2020-08-04 DEVICE — PATELLA TRIATHLON 31X9 SYMTRC: Type: IMPLANTABLE DEVICE | Site: KNEE | Status: FUNCTIONAL

## 2020-08-04 DEVICE — BASEPLATE TIB CEM PRIM SZ 6: Type: IMPLANTABLE DEVICE | Site: KNEE | Status: FUNCTIONAL

## 2020-08-04 RX ORDER — DEXAMETHASONE SODIUM PHOSPHATE 4 MG/ML
INJECTION, SOLUTION INTRA-ARTICULAR; INTRALESIONAL; INTRAMUSCULAR; INTRAVENOUS; SOFT TISSUE
Status: DISCONTINUED | OUTPATIENT
Start: 2020-08-04 | End: 2020-08-04

## 2020-08-04 RX ORDER — LIDOCAINE HYDROCHLORIDE 20 MG/ML
INJECTION INTRAVENOUS
Status: DISCONTINUED | OUTPATIENT
Start: 2020-08-04 | End: 2020-08-04

## 2020-08-04 RX ORDER — ONDANSETRON 2 MG/ML
4 INJECTION INTRAMUSCULAR; INTRAVENOUS DAILY PRN
Status: DISCONTINUED | OUTPATIENT
Start: 2020-08-04 | End: 2020-08-04 | Stop reason: HOSPADM

## 2020-08-04 RX ORDER — BUPIVACAINE HYDROCHLORIDE 2.5 MG/ML
INJECTION, SOLUTION INFILTRATION; PERINEURAL
Status: DISCONTINUED | OUTPATIENT
Start: 2020-08-04 | End: 2020-08-04

## 2020-08-04 RX ORDER — PREGABALIN 75 MG/1
150 CAPSULE ORAL
Status: COMPLETED | OUTPATIENT
Start: 2020-08-04 | End: 2020-08-04

## 2020-08-04 RX ORDER — FENTANYL CITRATE 50 UG/ML
INJECTION, SOLUTION INTRAMUSCULAR; INTRAVENOUS
Status: DISCONTINUED | OUTPATIENT
Start: 2020-08-04 | End: 2020-08-04

## 2020-08-04 RX ORDER — POLYETHYLENE GLYCOL 3350 17 G/17G
17 POWDER, FOR SOLUTION ORAL DAILY
Status: DISCONTINUED | OUTPATIENT
Start: 2020-08-04 | End: 2020-08-04 | Stop reason: HOSPADM

## 2020-08-04 RX ORDER — SODIUM CHLORIDE 9 MG/ML
INJECTION, SOLUTION INTRAVENOUS CONTINUOUS PRN
Status: DISCONTINUED | OUTPATIENT
Start: 2020-08-04 | End: 2020-08-04

## 2020-08-04 RX ORDER — FENTANYL CITRATE 50 UG/ML
25 INJECTION, SOLUTION INTRAMUSCULAR; INTRAVENOUS EVERY 5 MIN PRN
Status: DISCONTINUED | OUTPATIENT
Start: 2020-08-04 | End: 2020-08-04 | Stop reason: HOSPADM

## 2020-08-04 RX ORDER — BISACODYL 10 MG
10 SUPPOSITORY, RECTAL RECTAL EVERY 12 HOURS PRN
Status: DISCONTINUED | OUTPATIENT
Start: 2020-08-04 | End: 2020-08-04 | Stop reason: HOSPADM

## 2020-08-04 RX ORDER — KETAMINE HCL IN 0.9 % NACL 50 MG/5 ML
SYRINGE (ML) INTRAVENOUS
Status: DISCONTINUED | OUTPATIENT
Start: 2020-08-04 | End: 2020-08-04

## 2020-08-04 RX ORDER — PHENYLEPHRINE HYDROCHLORIDE 10 MG/ML
INJECTION INTRAVENOUS
Status: DISCONTINUED | OUTPATIENT
Start: 2020-08-04 | End: 2020-08-04

## 2020-08-04 RX ORDER — CEFAZOLIN SODIUM 1 G/3ML
2 INJECTION, POWDER, FOR SOLUTION INTRAMUSCULAR; INTRAVENOUS
Status: DISCONTINUED | OUTPATIENT
Start: 2020-08-04 | End: 2020-08-04 | Stop reason: HOSPADM

## 2020-08-04 RX ORDER — FAMOTIDINE 10 MG/ML
INJECTION INTRAVENOUS
Status: DISCONTINUED | OUTPATIENT
Start: 2020-08-04 | End: 2020-08-04

## 2020-08-04 RX ORDER — OXYCODONE HYDROCHLORIDE 5 MG/1
5 TABLET ORAL
Status: DISCONTINUED | OUTPATIENT
Start: 2020-08-04 | End: 2020-08-04 | Stop reason: HOSPADM

## 2020-08-04 RX ORDER — MUPIROCIN 20 MG/G
1 OINTMENT TOPICAL
Status: COMPLETED | OUTPATIENT
Start: 2020-08-04 | End: 2020-08-04

## 2020-08-04 RX ORDER — SODIUM CHLORIDE 0.9 % (FLUSH) 0.9 %
10 SYRINGE (ML) INJECTION
Status: DISCONTINUED | OUTPATIENT
Start: 2020-08-04 | End: 2020-08-04 | Stop reason: HOSPADM

## 2020-08-04 RX ORDER — CELECOXIB 200 MG/1
400 CAPSULE ORAL
Status: COMPLETED | OUTPATIENT
Start: 2020-08-04 | End: 2020-08-04

## 2020-08-04 RX ORDER — MIDAZOLAM HYDROCHLORIDE 1 MG/ML
1 INJECTION INTRAMUSCULAR; INTRAVENOUS EVERY 5 MIN PRN
Status: DISCONTINUED | OUTPATIENT
Start: 2020-08-04 | End: 2020-08-04 | Stop reason: HOSPADM

## 2020-08-04 RX ORDER — ROPIVACAINE HYDROCHLORIDE 2 MG/ML
INJECTION, SOLUTION EPIDURAL; INFILTRATION; PERINEURAL
Status: DISCONTINUED | OUTPATIENT
Start: 2020-08-04 | End: 2020-08-04

## 2020-08-04 RX ORDER — ONDANSETRON 2 MG/ML
INJECTION INTRAMUSCULAR; INTRAVENOUS
Status: DISCONTINUED | OUTPATIENT
Start: 2020-08-04 | End: 2020-08-04

## 2020-08-04 RX ORDER — PROPOFOL 10 MG/ML
VIAL (ML) INTRAVENOUS CONTINUOUS PRN
Status: DISCONTINUED | OUTPATIENT
Start: 2020-08-04 | End: 2020-08-04

## 2020-08-04 RX ORDER — LIDOCAINE HYDROCHLORIDE AND EPINEPHRINE 15; 5 MG/ML; UG/ML
INJECTION, SOLUTION EPIDURAL
Status: DISCONTINUED | OUTPATIENT
Start: 2020-08-04 | End: 2020-08-04

## 2020-08-04 RX ORDER — FAMOTIDINE 20 MG/1
20 TABLET, FILM COATED ORAL 2 TIMES DAILY
Status: DISCONTINUED | OUTPATIENT
Start: 2020-08-04 | End: 2020-08-04 | Stop reason: HOSPADM

## 2020-08-04 RX ORDER — PROPOFOL 10 MG/ML
VIAL (ML) INTRAVENOUS
Status: DISCONTINUED | OUTPATIENT
Start: 2020-08-04 | End: 2020-08-04

## 2020-08-04 RX ORDER — OXYCODONE HYDROCHLORIDE 5 MG/1
10 TABLET ORAL
Status: DISCONTINUED | OUTPATIENT
Start: 2020-08-04 | End: 2020-08-04 | Stop reason: HOSPADM

## 2020-08-04 RX ORDER — CEFAZOLIN SODIUM 1 G/3ML
2 INJECTION, POWDER, FOR SOLUTION INTRAMUSCULAR; INTRAVENOUS
Status: COMPLETED | OUTPATIENT
Start: 2020-08-04 | End: 2020-08-04

## 2020-08-04 RX ORDER — MIDAZOLAM HYDROCHLORIDE 1 MG/ML
INJECTION, SOLUTION INTRAMUSCULAR; INTRAVENOUS
Status: DISCONTINUED | OUTPATIENT
Start: 2020-08-04 | End: 2020-08-04

## 2020-08-04 RX ORDER — AMOXICILLIN 250 MG
1 CAPSULE ORAL 2 TIMES DAILY
Status: DISCONTINUED | OUTPATIENT
Start: 2020-08-04 | End: 2020-08-04 | Stop reason: HOSPADM

## 2020-08-04 RX ORDER — ACETAMINOPHEN 500 MG
1000 TABLET ORAL EVERY 6 HOURS
Status: DISCONTINUED | OUTPATIENT
Start: 2020-08-04 | End: 2020-08-04 | Stop reason: HOSPADM

## 2020-08-04 RX ORDER — ACETAMINOPHEN 500 MG
1000 TABLET ORAL
Status: COMPLETED | OUTPATIENT
Start: 2020-08-04 | End: 2020-08-04

## 2020-08-04 RX ORDER — ASPIRIN 81 MG/1
81 TABLET ORAL 2 TIMES DAILY
Status: DISCONTINUED | OUTPATIENT
Start: 2020-08-04 | End: 2020-08-04 | Stop reason: HOSPADM

## 2020-08-04 RX ORDER — PREGABALIN 75 MG/1
150 CAPSULE ORAL NIGHTLY
Status: DISCONTINUED | OUTPATIENT
Start: 2020-08-04 | End: 2020-08-04 | Stop reason: HOSPADM

## 2020-08-04 RX ORDER — SODIUM CHLORIDE 9 MG/ML
INJECTION, SOLUTION INTRAVENOUS
Status: COMPLETED | OUTPATIENT
Start: 2020-08-04 | End: 2020-08-04

## 2020-08-04 RX ORDER — LIDOCAINE HYDROCHLORIDE 10 MG/ML
1 INJECTION, SOLUTION EPIDURAL; INFILTRATION; INTRACAUDAL; PERINEURAL
Status: DISCONTINUED | OUTPATIENT
Start: 2020-08-04 | End: 2020-08-04 | Stop reason: HOSPADM

## 2020-08-04 RX ORDER — SODIUM CHLORIDE 9 MG/ML
INJECTION, SOLUTION INTRAVENOUS CONTINUOUS
Status: DISCONTINUED | OUTPATIENT
Start: 2020-08-04 | End: 2020-08-04 | Stop reason: HOSPADM

## 2020-08-04 RX ORDER — CARBOXYMETHYLCELLULOSE SODIUM 10 MG/ML
GEL OPHTHALMIC
Status: DISCONTINUED | OUTPATIENT
Start: 2020-08-04 | End: 2020-08-04

## 2020-08-04 RX ORDER — NALOXONE HCL 0.4 MG/ML
0.02 VIAL (ML) INJECTION
Status: DISCONTINUED | OUTPATIENT
Start: 2020-08-04 | End: 2020-08-04 | Stop reason: HOSPADM

## 2020-08-04 RX ORDER — GLYCOPYRROLATE 0.2 MG/ML
INJECTION INTRAMUSCULAR; INTRAVENOUS
Status: DISCONTINUED | OUTPATIENT
Start: 2020-08-04 | End: 2020-08-04

## 2020-08-04 RX ORDER — ONDANSETRON 2 MG/ML
4 INJECTION INTRAMUSCULAR; INTRAVENOUS EVERY 8 HOURS PRN
Status: DISCONTINUED | OUTPATIENT
Start: 2020-08-04 | End: 2020-08-04 | Stop reason: HOSPADM

## 2020-08-04 RX ADMIN — LIDOCAINE HYDROCHLORIDE,EPINEPHRINE BITARTRATE 1 ML: 15; .005 INJECTION, SOLUTION EPIDURAL; INFILTRATION; INTRACAUDAL; PERINEURAL at 09:08

## 2020-08-04 RX ADMIN — OXYCODONE HYDROCHLORIDE 5 MG: 5 TABLET ORAL at 11:08

## 2020-08-04 RX ADMIN — PHENYLEPHRINE HYDROCHLORIDE 100 MCG: 10 INJECTION INTRAVENOUS at 09:08

## 2020-08-04 RX ADMIN — Medication 15 MG: at 10:08

## 2020-08-04 RX ADMIN — PHENYLEPHRINE HYDROCHLORIDE 100 MCG: 10 INJECTION INTRAVENOUS at 08:08

## 2020-08-04 RX ADMIN — VANCOMYCIN HYDROCHLORIDE 1500 MG: 1.5 INJECTION, POWDER, LYOPHILIZED, FOR SOLUTION INTRAVENOUS at 06:08

## 2020-08-04 RX ADMIN — DEXAMETHASONE SODIUM PHOSPHATE 8 MG: 4 INJECTION, SOLUTION INTRAMUSCULAR; INTRAVENOUS at 08:08

## 2020-08-04 RX ADMIN — MIDAZOLAM HYDROCHLORIDE 2 MG: 1 INJECTION, SOLUTION INTRAMUSCULAR; INTRAVENOUS at 08:08

## 2020-08-04 RX ADMIN — LIDOCAINE HYDROCHLORIDE 40 MG: 20 INJECTION, SOLUTION INTRAVENOUS at 08:08

## 2020-08-04 RX ADMIN — Medication 25 MG: at 08:08

## 2020-08-04 RX ADMIN — SODIUM CHLORIDE, SODIUM GLUCONATE, SODIUM ACETATE, POTASSIUM CHLORIDE, MAGNESIUM CHLORIDE, SODIUM PHOSPHATE, DIBASIC, AND POTASSIUM PHOSPHATE: .53; .5; .37; .037; .03; .012; .00082 INJECTION, SOLUTION INTRAVENOUS at 09:08

## 2020-08-04 RX ADMIN — CARBOXYMETHYLCELLULOSE SODIUM 1 EACH: 10 GEL OPHTHALMIC at 08:08

## 2020-08-04 RX ADMIN — FENTANYL CITRATE 25 MCG: 50 INJECTION, SOLUTION INTRAMUSCULAR; INTRAVENOUS at 09:08

## 2020-08-04 RX ADMIN — MIDAZOLAM 2 MG: 1 INJECTION INTRAMUSCULAR; INTRAVENOUS at 07:08

## 2020-08-04 RX ADMIN — PROPOFOL 10 MG: 10 INJECTION, EMULSION INTRAVENOUS at 08:08

## 2020-08-04 RX ADMIN — GLYCOPYRROLATE 0.2 MG: 0.2 INJECTION, SOLUTION INTRAMUSCULAR; INTRAVENOUS at 08:08

## 2020-08-04 RX ADMIN — FENTANYL CITRATE 50 MCG: 50 INJECTION, SOLUTION INTRAMUSCULAR; INTRAVENOUS at 08:08

## 2020-08-04 RX ADMIN — PREGABALIN 150 MG: 75 CAPSULE ORAL at 06:08

## 2020-08-04 RX ADMIN — SODIUM CHLORIDE: 0.9 INJECTION, SOLUTION INTRAVENOUS at 07:08

## 2020-08-04 RX ADMIN — FENTANYL CITRATE 50 MCG: 50 INJECTION INTRAMUSCULAR; INTRAVENOUS at 07:08

## 2020-08-04 RX ADMIN — FAMOTIDINE 20 MG: 10 INJECTION, SOLUTION INTRAVENOUS at 08:08

## 2020-08-04 RX ADMIN — ACETAMINOPHEN 1000 MG: 500 TABLET ORAL at 06:08

## 2020-08-04 RX ADMIN — SODIUM CHLORIDE: 0.9 INJECTION, SOLUTION INTRAVENOUS at 06:08

## 2020-08-04 RX ADMIN — LIDOCAINE HYDROCHLORIDE,EPINEPHRINE BITARTRATE 2 ML: 15; .005 INJECTION, SOLUTION EPIDURAL; INFILTRATION; INTRACAUDAL; PERINEURAL at 10:08

## 2020-08-04 RX ADMIN — BUPIVACAINE HYDROCHLORIDE 20 ML: 2.5 INJECTION, SOLUTION INFILTRATION; PERINEURAL at 07:08

## 2020-08-04 RX ADMIN — LIDOCAINE HYDROCHLORIDE 60 MG: 20 INJECTION, SOLUTION INTRAVENOUS at 08:08

## 2020-08-04 RX ADMIN — CEFAZOLIN 2 G: 330 INJECTION, POWDER, FOR SOLUTION INTRAMUSCULAR; INTRAVENOUS at 08:08

## 2020-08-04 RX ADMIN — CELECOXIB 400 MG: 200 CAPSULE ORAL at 06:08

## 2020-08-04 RX ADMIN — PROPOFOL 100 MCG/KG/MIN: 10 INJECTION, EMULSION INTRAVENOUS at 08:08

## 2020-08-04 RX ADMIN — ROPIVACAINE HYDROCHLORIDE 15 ML: 2 INJECTION, SOLUTION EPIDURAL; INFILTRATION at 07:08

## 2020-08-04 RX ADMIN — SODIUM CHLORIDE, SODIUM GLUCONATE, SODIUM ACETATE, POTASSIUM CHLORIDE, MAGNESIUM CHLORIDE, SODIUM PHOSPHATE, DIBASIC, AND POTASSIUM PHOSPHATE: .53; .5; .37; .037; .03; .012; .00082 INJECTION, SOLUTION INTRAVENOUS at 10:08

## 2020-08-04 RX ADMIN — ROPIVACAINE HYDROCHLORIDE: 2 INJECTION, SOLUTION EPIDURAL; INFILTRATION at 11:08

## 2020-08-04 RX ADMIN — ONDANSETRON 4 MG: 2 INJECTION INTRAMUSCULAR; INTRAVENOUS at 10:08

## 2020-08-04 RX ADMIN — MUPIROCIN 1 G: 20 OINTMENT TOPICAL at 06:08

## 2020-08-04 NOTE — PLAN OF CARE
Pt resting in bed, spouse at bedside. Pt has no complaints at this time. Awaiting PT. No distress noted. Will continue to monitor.

## 2020-08-04 NOTE — BRIEF OP NOTE
"[facility]  Brief Operative Note    Surgery Date: 8/4/2020     Surgeon(s) and Role:     * Oli Centeno MD - Primary    Assisting Surgeon: None    Pre-op Diagnosis:  Primary osteoarthritis of right knee [M17.11]    Post-op Diagnosis:  Post-Op Diagnosis Codes:     * Primary osteoarthritis of right knee [M17.11]    Procedure(s) (LRB):  ARTHROPLASTY,KNEE,TOTAL,FILOMENA COMPUTER-ASSISTED NAVIGATION-SAME DAY (Right)    Anesthesia: Regional    Description of the findings of the procedure(s): see op note             Specimens:   Specimen (12h ago, onward)    None            Discharge Note    OUTCOME: Patient tolerated treatment/procedure well without complication and is now ready for discharge.    DISPOSITION: Home or Self Care    FINAL DIAGNOSIS:  Primary osteoarthritis of right knee    FOLLOWUP: In clinic    DISCHARGE INSTRUCTIONS:    Discharge Procedure Orders   BATH/SHOWER CHAIR FOR HOME USE     Order Specific Question Answer Comments   Height: 6' 2" (1.88 m)    Weight: 100.7 kg (222 lb)    Length of need (1-99 months): 99    Type: Without back      COMMODE FOR HOME USE     Order Specific Question Answer Comments   Type: Standard    Height: 6' 2" (1.88 m)    Weight: 100.7 kg (222 lb)    Length of need (1-99 months): 1      WALKER FOR HOME USE     Order Specific Question Answer Comments   Type of Walker: Adult (5'4"-6'6")    With wheels? Yes    Height: 6' 2" (1.88 m)    Weight: 100.7 kg (222 lb)    Length of need (1-99 months): 1    Please check all that apply: Walker will be used for gait training.      TRANSFER TUB BENCH FOR HOME USE     Order Specific Question Answer Comments   Type of Transfer Tub Bench: Unpadded    Height: 6' 2" (1.88 m)    Weight: 100.7 kg (222 lb)    Length of need (1-99 months): 1      Activity as tolerated     Call MD for:  difficulty breathing, headache or visual disturbances     Call MD for:  extreme fatigue     Call MD for:  hives     Call MD for:  persistent " dizziness or light-headedness     Call MD for:  persistent nausea and vomiting     Call MD for:  redness, tenderness, or signs of infection (pain, swelling, redness, odor or green/yellow discharge around incision site)     Call MD for:  severe uncontrolled pain     Call MD for:  temperature >100.4     Keep surgical extremity elevated when not ambulating     Leave dressing on - Keep it clean, dry, and intact until clinic visit   Order Comments: Do not remove surgical dressing for 2 weeks post-op. This will be done only by MD at initial post-op visit. If dressing is completely saturated, replace with identical dressing - silver-impregnated hydrocolloid dressing.     Do not get dressings wet. Do not shower.     If dressing continues to be saturated or there are signs of infection, please call Ortho Clinic [phone] for further instructions and to make appt to be seen.     Lifting restrictions   Order Comments: No strenuous exercise or lifting of > 10 lbs     No driving, operating heavy equipment or signing legal documents while taking pain medication     On POD1 remove ace wrap and cotton padding. Leave Aquacel bandage on until 2  week post op visit in clinic     Sponge bath only until clinic visit     Weight bearing as tolerated

## 2020-08-04 NOTE — ANESTHESIA PREPROCEDURE EVALUATION
08/04/2020  Hilario Thomas is a 65 y.o., male.    Procedure Summary    Case: [id number] Date/Time: 08/04/20 0815   Procedure: ARTHROPLASTY,KNEE,TOTAL,FILOMENA COMPUTER-ASSISTED NAVIGATION-SAME DAY (Right Knee)   Anesthesia type: Regional   Diagnosis: Primary osteoarthritis of right knee [M17.11]     Patient Active Problem List   Diagnosis    Family history of ischemic heart disease    Abnormal ECG    Primary osteoarthritis of both knees    Erectile dysfunction    Family history of diabetes mellitus    Edema    Status post total left knee replacement 5/5/2020    Arrhythmia    Primary osteoarthritis of left knee    Chronic pain of left knee    Difficulty walking    Status post total right knee replacement 8/4/2020     Past Surgical History:   Procedure Laterality Date    KNEE SURGERY       Medication List with Changes/Refills   Current Medications    ACETAMINOPHEN (TYLENOL) 650 MG TBSR    Take 1 tablet (650 mg total) by mouth every 8 (eight) hours as needed.    ASCORBIC ACID (VITAMIN C ORAL)    Take by mouth. Hold for 1 week prior to surgery    ASPIRIN (ECOTRIN) 81 MG EC TABLET    Take 1 tablet (81 mg total) by mouth 2 (two) times daily.    CELECOXIB (CELEBREX) 200 MG CAPSULE    Take 1 capsule (200 mg total) by mouth once daily.    DOCUSATE SODIUM (COLACE) 100 MG CAPSULE    Take 1 capsule (100 mg total) by mouth 2 (two) times daily as needed for Constipation.    ERGOCALCIFEROL, VITAMIN D2, (VITAMIN D ORAL)    Take by mouth. Hold for 1 week prior to surgery    OXYCODONE (ROXICODONE) 5 MG IMMEDIATE RELEASE TABLET    Take 1-2 tablets by mouth every 4-6 hours as needed for pain   Discontinued Medications    ASPIRIN (ECOTRIN) 81 MG EC TABLET    Take 1 tablet (81 mg total) by mouth 2 (two) times daily.         Pre-op Assessment    I have reviewed the Patient Summary Reports.     I have reviewed the Nursing  Notes. I have reviewed the NPO Status.      Review of Systems  Anesthesia Hx:  No problems with previous Anesthesia  Denies Family Hx of Anesthesia complications.   Denies Personal Hx of Anesthesia complications.   Social:  Former Smoker, Social Alcohol Use    Cardiovascular:    Denies Angina.  Functional Capacity good / => 4 METS    Pulmonary:   Denies Shortness of breath.  Denies Recent URI.  Possible Obstructive Sleep Apnea , (STOP/BANG) Symptoms S - Snoring (loud), A - Age > 50 and G - Gender (Male > Female)        Musculoskeletal:  Musculoskeletal General/Symptoms: joint pain. Functional capacity is ambulatory without assistance.  Joint Disease:  Arthritis, Osteoarthritis    Neurological:  Pain , onset is chronic , location of knee , alleviating factors are mobic prn. Osteoarthritis    Psych:  Psychiatric Normal           Physical Exam  General:  Well nourished    Airway/Jaw/Neck:  Jaw/Neck Findings: (per pt) Neck ROM: Extension Decreased, Mild      Dental:  Dental Findings: In tact        Mental Status:  Mental Status Findings:  Cooperative, Alert and Oriented         Anesthesia Plan  Type of Anesthesia, risks & benefits discussed:  Anesthesia Type:  general, CSE, epidural, regional, spinal  Patient's Preference:   Intra-op Monitoring Plan: standard ASA monitors  Intra-op Monitoring Plan Comments:   Post Op Pain Control Plan:   Post Op Pain Control Plan Comments:   Induction:   IV  Beta Blocker:  Patient is not currently on a Beta-Blocker (No further documentation required).       Informed Consent: Patient understands risks and agrees with Anesthesia plan.  Questions answered. Anesthesia consent signed with patient.  ASA Score: 2     Day of Surgery Review of History & Physical:    H&P update referred to the surgeon.         Ready For Surgery From Anesthesia Perspective.

## 2020-08-04 NOTE — PLAN OF CARE
Problem: Occupational Therapy Goal  Goal: Occupational Therapy Goal  Outcome: Met    OT goals met during evaluation in PACU. Patient supervision for ADLs and will have assistance from wife if needed.     Carla Kumar, OT  8/4/2020

## 2020-08-04 NOTE — TRANSFER OF CARE
"Anesthesia Transfer of Care Note    Patient: Hilario Thomas    Procedure(s) Performed: Procedure(s) (LRB):  ARTHROPLASTY,KNEE,TOTAL,FILOMENA COMPUTER-ASSISTED NAVIGATION-SAME DAY (Right)    Patient location: PACU    Anesthesia Type: CSE and general    Transport from OR: Transported from OR on room air with adequate spontaneous ventilation    Post pain: adequate analgesia    Post assessment: no apparent anesthetic complications and tolerated procedure well    Post vital signs: stable    Level of consciousness: awake    Nausea/Vomiting: no nausea/vomiting    Complications: none    Transfer of care protocol was followed      Last vitals:   Visit Vitals  /66   Pulse 84   Temp 36.7 °C (98.1 °F) (Oral)   Resp 16   Ht 6' 2" (1.88 m)   Wt 100.7 kg (222 lb)   SpO2 100%   BMI 28.50 kg/m²     "

## 2020-08-04 NOTE — PLAN OF CARE
Pre-op complete and checklist complete. Wife at bedside. Clothing placed in locker #20, and walker placed in rm #4.

## 2020-08-04 NOTE — PT/OT/SLP EVAL
Physical Therapy Evaluation and Discharge Note    Patient Name:  Hilario Thomas   MRN:  [id number]    Recommendations:     Discharge Recommendations:  home, outpatient PT   Discharge Equipment Recommendations: none   Barriers to discharge: None    Assessment:     Hilario Thomas is a 65 y.o. male admitted with a medical diagnosis of Primary osteoarthritis of right knee. Pt evaluated in PACU and transported to PT dept via w/c with RN in attendance for gait training on steps using standard walker with pt requiring CG. Pt required SBA to amb 160 ft with standard walker WBAT RLE and to stand to urinate at toilet and wash hands at sink. Pt transferred independently supine to sit from stretcher. SBA for sit<>stand transfer w/c<>standard walker. Pt verbally demonstrated understanding of car transfers and performance of TKA home exercise program.    Recent Surgery: Procedure(s) (LRB):  ARTHROPLASTY,KNEE,TOTAL,FILOMENA COMPUTER-ASSISTED NAVIGATION-SAME DAY (Right) Day of Surgery    Plan:     During this hospitalization, patient does not require further acute PT services.  Please re-consult if situation changes.      Subjective     Chief Complaint: Need to urinate   Patient/Family Comments/goals: Ready to go home. Recovered well from L knee replacement in May  Pain/Comfort:  · Pain Rating 1: 3/10  · Location - Side 1: Left  · Location - Orientation 1: generalized  · Location 1: knee  · Pain Addressed 1: Pre-medicate for activity, Reposition, Cessation of Activity  · Pain Rating Post-Intervention 2: 3/10    Patients cultural, spiritual, Congregational conflicts given the current situation: no    Living Environment:  Pt lives in a 1 story home which has 4 low steps. Pt lives with his wife  Prior to admission, patients level of function was independent.  Equipment used at home: walker, standard, raised toilet.  Upon discharge, patient will have assistance from wife.    Objective:     Communicated with RN prior to session and was  present throughout PT session.  Patient found On stretcher with HOB elevated with perineural catheter, cryotherapy, FCD, peripheral IV upon PT entry to room.    General Precautions: Standard, fall   Orthopedic Precautions:RLE weight bearing as tolerated   Braces: N/A     Exams:  · Cognitive Exam:  Patient is oriented to Person, Place, Time and Situation    Functional Mobility:  · Transfers: supine to sitting on side of stretcher with SBA  · Sit<>stand w/c<> standard walker with SBA WBAT RLE  · Standing balance to urinate at toilet and washing hands at sink using standard walker WBAT RLE required SBA  · Gait: Pt amb 160ft WBAT RLE with CG initially decreasing to SBA using standard walker. Pt required CG to ascend/descend 5 low steps times 2 reps using standard walker with 4 legs on 2 steps at a time with WBAT RLE and step to gait pattern    AM-PAC 6 CLICK MOBILITY  Total Score:18       Therapeutic Activities and Exercises:   Instructed pt in use of Polar cryo therapy at home and performance of home exercises with written instructions provided. Discussed car transfers with pt with pt verbally expressing understanding of all of above.     AM-PAC 6 CLICK MOBILITY  Total Score:18     Patient left w/c with all lines intact, call button in reach and RN and wife present.    GOALS:   Multidisciplinary Problems     Physical Therapy Goals     Not on file          Multidisciplinary Problems (Resolved)        Problem: Physical Therapy Goal    Goal Priority Disciplines Outcome Goal Variances Interventions   Physical Therapy Goal   (Resolved)     PT, PT/OT Met     Description: Physical Therapy evaluation performed. Pt to have wife provide assistance at home.  The following goals were met:  1. Pt required SBA for sit<>stand transfers with standard walker;  2. Pt amb 160ft with SBA with standard walker WBAT RLE   3. Pt ascended/descended 5 low steps placing 4 legs of walker on 2 steps at a time requiring CG  4. Pt verbally  demonstrated understanding of car transfers and performance of TKA exercise program                   History:     Past Medical History:   Diagnosis Date    Abnormal ECG 5/31/2016 5/31/2016: ECG: Inferior Q waves.    Arthritis     Family history of ischemic heart disease 5/31/2016    Mother: Age 55: Myocardial infarction.       Past Surgical History:   Procedure Laterality Date    KNEE SURGERY         Time Tracking:     PT Received On: 08/04/20  PT Start Time: 1300     PT Stop Time: 1326  PT Total Time (min): 26 min     Billable Minutes: Evaluation 8 and Gait Training 18      Lisa Chung, PT  08/04/2020

## 2020-08-04 NOTE — OP NOTE
DATE OF PROCEDURE:  8/4/2020.     PREOPERATIVE DIAGNOSIS:  Arthritis, right knee.     POSTOPERATIVE DIAGNOSIS:  Arthritis, right knee.     PROCEDURES PERFORMED:  Robotically-assisted rightt total knee arthroplasty.     SURGEON:  Oli Centeno M.D.     ASSISTANT:  CARINA Moreno MD     ANESTHESIA:  Regional.     COMPLICATIONS:  None.     COUNTS:  Correct.     DISPOSITION:  Recovery Room, stable.     SPECIMENS:  Bone and cartilage.     FINDINGS:  Arthritis.     FLUIDS:  2000 mL.     ESTIMATED BLOOD LOSS:  <50cc     IMPLANTS:  Lauren Triathlon size 6 right  Triathlon cruciate retaining femoral component and a size 6 primary tibial baseplate, 31 mm patella and a size 6, 13 mm   CS tibial insert.     INDICATIONS FOR PROCEDURE:   Hilario Thomas  is a 65-year-old male who is   having symptoms of right knee pain.  Physical examination and imaging studies   were consistent with arthritis.Treatment options were explained and it was decided   to proceed with robotically-assisted right total knee arthroplasty.  He was   aware of reasonable treatment options as well as risks and benefits.       PROCEDURE IN DETAIL:  After appropriate consent was obtained, the patient   brought in the Operating Room, anesthesia was administered.  He received   antibiotic prophylaxis.  Cast padding and tourniquet was applied to the proximal  right thigh.  right lower extremity was then prepped and draped in usual sterile   fashion.  The leg colin was applied.  Timeout was called.  Limb was elevated   and tourniquet was inflated.  The knee was flexed.  An incision was made from   the tibial tubercle just proximal to the superior pole of the patella.  It was   taken down through the skin and retinacular.  A medial parapatellar arthrotomy   was performed followed by a medial release.  Following this ACL was resected, fat pad   was excised.  The patella was everted.  It was measured and found to be 24 mm,  9 mm of bone removed and the 3 peg  holes were drilled for the 31 mm patella.    Following this, 2 stab incisions were made 4 fingerbreadths below the tibial   tubercle on the medial aspect of the tibial crest.  The 2 guide pins were placed   along with the fixation device through these.  The array was applied and   tightened to the clamp. We checked the security of the array and it was fine. Following this, we placed the femoral pins 1 cm superior and anterior to the MCL insertion.  The check point was placed in between the pins. The guide clamp and array were secured..  Once this was accomplished, the hip center was obtained, the   medial and lateral malleoli were demarcated.  The femoral check point and tibial   check point were placed and the femur and tibia were then registered.    Acceptable registration was obtained.  We then captured poses in extension,   early flexion, and approximately 90 degrees of flexion and initially he had alignment of 10 degrees varus and -1  Degrees flexion .  The gaps were   then obtained.The extension gaps were 16 medially and 27 laterally.  The flexion gaps were 14 medially and 19 laterally.  Component   position was adjusted.  We were very satisfied with the component position and   sizing.  We had a size 6 femur and a 6 tibia.  Once this was accomplished, the   robotic arm was brought in and our cuts were made.  The distal femur and   posterior chamfers were cut first followed by anterior chamfers, posterior   chamfers, posterior femur, and then tibia.  We were very satisfied all the cuts.  We   removed osteophytes, removed the meniscal remnants.  The PCL was intact and   robust.  We placed the tibial trial.  We had good coverage with this.  We used   the medial one-third of the tibial tubercle to guide rotation and the trial was pinned in   place.  A 13 mm insert was placed and then the femoral component was placed.    This was centered on the femur and the knee was brought through a range of   motion.  He was  very well balanced in flexion with 20 mm gaps medially and   laterally.  In extension, there was an 21 mm gap medially 22 mm gap laterally.  Final correction was 0 degree flexion, 4 degrees varus  Clinically,there was excellent balance and stability.  Trial   patellar button was placed.  Patella tracked well.  Therefore, at this point, we   were satisfied with knee range of motion and stability, component position,   sizing and alignment as well as patella tracking.  It should be noted that he  had full extension and he had at least 130 degrees of flexion and there was no   instability at full extension, midflexion, or deep flexion.  Trial components   were removed.  Arrays and femoral and tibial pins were removed. The bone was then prepared for cementing for pulsatile lavage and   drying. Components were then cemented into   place. Tibia followed by femur.  Cement was applied to the tibial keel as   well.  Excess cement was removed.  The tibial insert was firmly seated.  The knee was inspected.  There was no loose body, foreign body, or soft tissue interposition.  It was   then reduced.  Patella button was cemented in place.  Once the cement was dry,   the knee was irrigated with Betadine solution.  Following this, I was irrigated   with pulsatile lavage.  This was periodically done throughout the closure.  The   incision was then closed.  The arthrotomy was closed with #1 Vicryl.  Once the   arthrotomy was closed, the knee was brought through range of motion and was   stable as previously described and the patella tracked well.  The remainder of   the incision was closed with 0 Vicryl, 2-0 Vicryl, Monocryl, and Dermabond.    The stab incisions were closed with Vicryl and Dermabond.  It should be noted   that all check points were removed as well as the femoral and tibial pins.    Sterile dressing was applied.  He was transferred from the Operating Room table   to a stretcher, brought to Recovery Room in stable  condition.  He tolerated the   procedure well and there were no known complications.

## 2020-08-04 NOTE — ANESTHESIA PROCEDURE NOTES
IPACK    Patient location during procedure: pre-op   Block not for primary anesthetic.  Reason for block: at surgeon's request and post-op pain management   Post-op Pain Location: Right knee pain  Start time: 8/4/2020 7:35 AM  Timeout: 8/4/2020 7:25 AM   End time: 8/4/2020 7:40 AM    Staffing  Authorizing Provider: Hanny Singh MD  Performing Provider: Hanny Singh MD    Preanesthetic Checklist  Completed: patient identified, site marked, surgical consent, pre-op evaluation, timeout performed, IV checked, risks and benefits discussed and monitors and equipment checked  Peripheral Block  Patient position: supine  Prep: ChloraPrep  Patient monitoring: heart rate, cardiac monitor, continuous pulse ox, continuous capnometry and frequent blood pressure checks  Block type: I PACK  Laterality: right  Injection technique: single shot  Needle  Needle type: Stimuplex   Needle gauge: 21 G  Needle length: 4 in  Needle localization: anatomical landmarks and ultrasound guidance   -ultrasound image captured on disc.  Assessment  Injection assessment: negative aspiration, negative parasthesia and local visualized surrounding nerve  Paresthesia pain: none  Heart rate change: no  Slow fractionated injection: yes  Additional Notes  VSS.  [facility] RN monitoring vitals throughout procedure.  Patient tolerated procedure well.

## 2020-08-04 NOTE — PT/OT/SLP EVAL
"Occupational Therapy   Evaluation/Discharge    Name: Hilario Thomas  MRN: [id number]  Admitting Diagnosis:  Primary osteoarthritis of right knee Day of Surgery    Recommendations:     Discharge Recommendations: home  Discharge Equipment Recommendations:  none  Barriers to discharge:       Assessment:     Hilario Thomas is a 65 y.o. male with a medical diagnosis of Primary osteoarthritis of right knee.  Patient is s/p right TKA. Patient seen in PACU for OT evaluation. He completed dressing task at supervision and will have assistance of wife at discharge.  Performance deficits affecting function: weakness, impaired self care skills, impaired functional mobilty, gait instability, impaired balance, decreased lower extremity function, orthopedic precautions.      Rehab Prognosis: Good; patient would benefit from acute skilled OT services to address these deficits and reach maximum level of function.       Plan:     · DC from OT     Subjective     Chief Complaint: "slight pain in knee"  Patient/Family Comments/goals: "get back to yardwork in 2 weeks"    Occupational Profile:  Living Environment: lives with wife in 1 level home with 3 steps, walk in shower, raised toilet seat   Previous level of function: independent with ADLs, left TKA May2020  Roles and Routines: Retired, enjoys hunting, fishing and yardwork   Equipment Used at Home:  walker, standard, raised toilet  Assistance upon Discharge: wife     Pain/Comfort:  · Pain Rating 1: 3/10  · Location - Side 1: Right  · Location - Orientation 1: generalized  · Location 1: knee  · Pain Addressed 1: Pre-medicate for activity, Reposition, Distraction    Patients cultural, spiritual, Faith conflicts given the current situation: no    Objective:     Communicated with: RN prior to session.  Patient found in wheelchair as direct handoff from physical therapy with (On-Q) upon OT entry to room.    General Precautions: Standard, fall   Orthopedic Precautions:RLE weight " bearing as tolerated   Braces: N/A     Occupational Performance:    Functional Mobility/Transfers:  · Patient completed Sit <> Stand Transfer with supervision  with  rolling walker  x 3 bouts from wheelchair     Activities of Daily Living:  · Upper Body Dressing: supervision sitting in wheelchair to don button up shirt  · Lower Body Dressing: supervision sitting in wheelchair to don underwear/shorts, shoes    Cognitive/Visual Perceptual:  Cognitive/Psychosocial Skills:     -       Oriented to: Person, Place and Time   -       Follows Commands/attention:Follows multistep  commands    Physical Exam:  Upper Extremity Range of Motion:     -       Right Upper Extremity: WFL  -       Left Upper Extremity: WFL  Upper Extremity Strength:    -       Right Upper Extremity: WFL  -       Left Upper Extremity: WFL    AMPAC 6 Click ADL:  AMPAC Total Score: 19    Treatment & Education:  -Patient educated to dress surgical side first and further dressing techniques s/p surgery   -Patient educated on hand placement for transfers   -Patient educated on rolling walker placement for ADLs  -Patient educated on polar ice use  -Patient educated on proper foot wear s/p surgery   -Patient educated on car transfers s/p surgery  -Patient educated on role of OT and plan of care   Education:    Patient left sitting in wheelchair with all lines intact and RN and wife present    GOALS:   Multidisciplinary Problems     Occupational Therapy Goals     Not on file          Multidisciplinary Problems (Resolved)        Problem: Occupational Therapy Goal    Goal Priority Disciplines Outcome Interventions   Occupational Therapy Goal   (Resolved)     OT, PT/OT Met                    History:     Past Medical History:   Diagnosis Date    Abnormal ECG 5/31/2016 5/31/2016: ECG: Inferior Q waves.    Arthritis     Family history of ischemic heart disease 5/31/2016    Mother: Age 55: Myocardial infarction.       Past Surgical History:   Procedure  Laterality Date    KNEE SURGERY         Time Tracking:     OT Date of Treatment: 08/04/20  OT Start Time: 1326  OT Stop Time: 1342  OT Total Time (min): 16 min    Billable Minutes:Evaluation 8  Self Care/Home Management 8    Carla Kumar OT  8/4/2020

## 2020-08-04 NOTE — PLAN OF CARE
Problem: Physical Therapy Goal  Goal: Physical Therapy Goal  Description: Physical Therapy evaluation performed. Pt to have wife provide assistance at home.  The following goals were met:  1. Pt required SBA for sit<>stand transfers with standard walker;  2. Pt amb 160ft with SBA with standard walker WBAT RLE   3. Pt ascended/descended 5 low steps placing 4 legs of walker on 2 steps at a time requiring CG  4. Pt verbally demonstrated understanding of car transfers and performance of TKA exercise program  Outcome: Met

## 2020-08-04 NOTE — DISCHARGE INSTRUCTIONS
[employer] CARE CUBE COLD THERAPY SYSTEM    The Polar Care Cube Cold Therapy System is simple and reliable. It is easy to use, compact design makes it great for home use. With the addition of ice and water, you will enjoy 6-8 hours of effortless cold therapy. Proper use requires an insulation barrier between the pad and the patient's skin.  Instructions on how to use the Polar Care Cube Cold Therapy System Below:

## 2020-08-04 NOTE — PROGRESS NOTES
8/4/2020 1212    On-Q teaching done with patient and patient's wife at bedside. Verbalizes understanding. Wife agrees to stay with patient for the next 72 hours while medication is infusing. All questions answered. On-Q contract signed, home care instruction pamphlet and extra home care supplies provided to patient upon discharge. Encouraged to contact anesthesia with any questions/concerns.

## 2020-08-04 NOTE — INTERVAL H&P NOTE
Hilario Thomas was interviewed, examined and the H and P reviewed.  There has been no interval change in his History and Physical.

## 2020-08-04 NOTE — BRIEF OP NOTE
Comfortable  Vitals Stable  Dressing Dry/Intact  Bilateral lower extremities: Palpable DP, good capillary refill  Motor sensation intact  xrays taken today demonstrate a well fixed and positioned TKA.  S/P TKA  OK for d/c

## 2020-08-04 NOTE — ANESTHESIA PROCEDURE NOTES
CSE    Patient location during procedure: OR  Start time: 8/4/2020 8:12 AM  Timeout: 8/4/2020 8:10 AM  End time: 8/4/2020 8:20 AM    Staffing  Authorizing Provider: Hanny Singh MD  Performing Provider: Hanny Singh MD    Preanesthetic Checklist  Completed: patient identified, site marked, surgical consent, pre-op evaluation, timeout performed, IV checked, risks and benefits discussed and monitors and equipment checked  CSE  Patient position: sitting  Patient monitoring: heart rate, cardiac monitor, continuous pulse ox, continuous capnometry and frequent blood pressure checks  Approach: midline  Spinal Needle  Needle length: 5 in  Epidural Needle  Injection technique: SHITAL saline  Needle type: Tuohy   Needle gauge: 17 G  Needle length: 4 in  Needle insertion depth: 7 cm  Location: L4-5  Needle localization: anatomical landmarks  Catheter  Catheter at skin depth: 12 cm  Additional Documentation: no paresthesia on injection, negative aspiration for CSF, incremental injection and negative test dose  Assessment  Sensory level: T4  Intrathecal Medications:  administered: primary anesthetic mcg of

## 2020-08-04 NOTE — ANESTHESIA PROCEDURE NOTES
Adductor Canal Catheter    Patient location during procedure: pre-op   Block not for primary anesthetic.  Reason for block: at surgeon's request and post-op pain management   Post-op Pain Location: Right knee pain  Start time: 8/4/2020 7:21 AM  Timeout: 8/4/2020 7:20 AM   End time: 8/4/2020 7:35 AM    Staffing  Authorizing Provider: Hanny Singh MD  Performing Provider: Hanny Singh MD    Preanesthetic Checklist  Completed: patient identified, site marked, surgical consent, pre-op evaluation, timeout performed, IV checked, risks and benefits discussed and monitors and equipment checked  Peripheral Block  Patient position: supine  Prep: ChloraPrep and site prepped and draped  Patient monitoring: heart rate, cardiac monitor, continuous pulse ox, continuous capnometry and frequent blood pressure checks  Block type: adductor canal  Laterality: right  Injection technique: continuous  Needle  Needle type: Tuohy   Needle gauge: 17 G  Needle length: 3.5 in  Needle localization: anatomical landmarks and ultrasound guidance  Catheter type: spring wound  Catheter size: 19 G  Test dose: lidocaine 1.5% with Epi 1-to-200,000 and negative   -ultrasound image captured on disc.  Assessment  Injection assessment: negative aspiration, negative parasthesia and local visualized surrounding nerve  Paresthesia pain: none  Heart rate change: no  Slow fractionated injection: yes  Additional Notes  VSS.  [facility] RN monitoring vitals throughout procedure.  Patient tolerated procedure well.

## 2020-08-04 NOTE — PLAN OF CARE
VSS. Pt able to tolerate oral liquids. Pt reports tolerable pain level for D/C. Polar care and dressing intact. Spouse received home meds per bedside delivery. Polar care power adapter placed with pts personal belongings. Walker at bedside for home use. No distress noted. Pt states he is ready for D/C. D/C instructions reviewed with pt and spouse, verbalized understanding. All questions addressed and answered.

## 2020-08-05 NOTE — ANESTHESIA POSTPROCEDURE EVALUATION
Anesthesia Post Evaluation    Patient: Hilario Thomas    Procedure(s) Performed: Procedure(s) (LRB):  ARTHROPLASTY,KNEE,TOTAL,FILOMENA COMPUTER-ASSISTED NAVIGATION-SAME DAY (Right)    Final Anesthesia Type: CSE    Patient location during evaluation: PACU  Patient participation: Yes- Able to Participate  Level of consciousness: awake and alert and oriented  Post-procedure vital signs: reviewed and stable  Pain management: adequate  Airway patency: patent    PONV status at discharge: No PONV  Anesthetic complications: no      Cardiovascular status: hemodynamically stable  Respiratory status: nasal cannula  Hydration status: euvolemic  Follow-up not needed.          Vitals Value Taken Time   /80 08/04/20 1301   Temp 36.3 °C (97.3 °F) 08/04/20 1338   Pulse 84 08/04/20 1303   Resp 21 08/04/20 1302   SpO2 94 % 08/04/20 1303   Vitals shown include unvalidated device data.      Event Time   Out of Recovery 12:31:00         Pain/Hamzah Score: Pain Rating Prior to Med Admin: 5 (8/4/2020 11:44 AM)  Pain Rating Post Med Admin: 5 (8/4/2020 12:33 PM)  Hamzah Score: 10 (8/4/2020 12:33 PM)

## 2020-08-05 NOTE — PROGRESS NOTES
8/5/2020 1535    Patient called at home. Reports pain well controlled with On-Q. Patient denies signs of local anesthetic toxicity. PNC dressing remains clean, dry, and intact. All questions answered. Encouraged to call if any issues arise.

## 2020-08-06 ENCOUNTER — CLINICAL SUPPORT (OUTPATIENT)
Dept: REHABILITATION | Facility: HOSPITAL | Age: 65
End: 2020-08-06
Payer: COMMERCIAL

## 2020-08-06 DIAGNOSIS — M25.561 ACUTE PAIN OF RIGHT KNEE: Primary | ICD-10-CM

## 2020-08-06 DIAGNOSIS — G89.29 CHRONIC PAIN OF LEFT KNEE: ICD-10-CM

## 2020-08-06 DIAGNOSIS — R26.2 DIFFICULTY WALKING: ICD-10-CM

## 2020-08-06 DIAGNOSIS — M25.562 CHRONIC PAIN OF LEFT KNEE: ICD-10-CM

## 2020-08-06 PROCEDURE — 97162 PT EVAL MOD COMPLEX 30 MIN: CPT

## 2020-08-06 PROCEDURE — 97110 THERAPEUTIC EXERCISES: CPT

## 2020-08-06 NOTE — PROGRESS NOTES
8/6/2020 1421    Patient called at home. Reports pain well controlled with On-Q. Patient denies signs of local anesthetic toxicity. PNC dressing remains clean, dry, and intact. All questions answered. Encouraged to call if any issues arise.

## 2020-08-06 NOTE — PLAN OF CARE
[facility]  Physical Therapy Initial Evaluation    Date: 8/6/2020   Name: Hilario Thomas  Clinic Number: [id number]    Therapy Diagnosis:   Encounter Diagnoses   Name Primary?    Chronic pain of left knee     Difficulty walking     Acute pain of right knee Yes     Physician: Mariel Quintanilla PA-C    Physician Orders: PT Eval and Treat   Medical Diagnosis from Referral:    M17.12 (ICD-10-CM) - Primary osteoarthritis of left knee   Z96.652 (ICD-10-CM) - Status post total left knee replacement          Evaluation Date: 8/6/2020  Authorization Period Expiration: 08/06/2021  Plan of Care Expiration: 12/31/2020  Visit # / Visits authorized: 1/ 1    Time In: 1141  Time Out: 1225  Total Appointment Time (timed & untimed codes): 35 minutes    Precautions: Standard    Subjective   Date of onset: 8/4/2020  History of current condition - Hilario reports: Moderate pain following his surgery but he has been able to complete his home exercises.      Pain:  Current 5/10, worst 7/10, best 2/10   Location: Right knee  Description: Aching  Aggravating Factors: Walking  Easing Factors: ice    Pts goals: Reduce knee pain so he can resume ADLs    Medical History:   Past Medical History:   Diagnosis Date    Abnormal ECG 5/31/2016 5/31/2016: ECG: Inferior Q waves.    Arthritis     Family history of ischemic heart disease 5/31/2016    Mother: Age 55: Myocardial infarction.       Surgical History:   Hilario Thomas  has a past surgical history that includes Knee surgery.    Medications:   Hilario has a current medication list which includes the following prescription(s): acetaminophen, ascorbic acid, aspirin, celecoxib, docusate sodium, ergocalciferol (vitamin d2), and oxycodone.    Allergies:   Review of patient's allergies indicates:  No Known Allergies       Prior Therapy: Yes. Rehabilitation following left TKA and prehab for the R TKA  Exercise Routine/Sports Participation: Home exercises  "following L TKA  Social History: Lives home with wife  Occupation: Retired  Prior Level of Function: Full with knee pain  Current Level of Function: Unable to walk without AD.    Objective     Observation: No JULIET hose. Moderate swelling in knee. Bandage covering incision but no signs of discharge.    Gait: Rolling walker with antalgic gait pattern.    Knee Active Range of Motion:   Right  Left    Flexion 35 120   Extension Lacking 5 degrees 0     Knee Passive Range of Motion:   Right  Left    Flexion 45 120   Extension Lacking 5 degrees 0       Ankle Active Range of Motion: WNL      Quad Set: Poor      Palpation: Mild warmth but negative tenderness.       Sensation: Negative numbness or tingling      Edema:  Girth Measurement Joint line   Right 49 cm   Left 43 cm       Special Tests: Not performed today due to post-op status   Strength: Not performed today due to post-op status.       TREATMENT   Treatment Time In: 1200  Treatment Time Out: 1225  Total Treatment time (time-based codes) separate from Evaluation: 25 minutes    Hilario received therapeutic exercises to develop strength, endurance, ROM and flexibility for 25 minutes including:  Heel prop 5"  Quad set w/ 1/2 towel roll 3 x 10  Heel slide 3'  Seated knee flexion AAROM x 5'  Time includes education    Education provided:   - Importance of mobility and swelling control early in rehabilitation    Written Home Exercises Provided: Patient instructed to cont prior HEP.  Exercises were reviewed and Hilario was able to demonstrate them prior to the end of the session.  Hilario demonstrated good  understanding of the education provided.     See EMR under Patient Instructions for exercises provided 8/6/2020.    Assessment   Hilario is a 65 y.o. male referred to outpatient Physical Therapy with a medical diagnosis of s/p R TKA. Pt presents with Increased joint swelling, pain, gait deviations, and poor functional mobility as is expected in this stage of " rehabilitation.     Pt prognosis is Excellent.   Pt will benefit from skilled outpatient Physical Therapy to address the deficits stated above and in the chart below, provide pt/family education, and to maximize pt's level of independence.     Plan of care discussed with patient: Yes  Pt's spiritual, cultural and educational needs considered and patient is agreeable to the plan of care and goals as stated below:     Anticipated Barriers for therapy: None    Medical Necessity is demonstrated by the following  History  Co-morbidities and personal factors that may impact the plan of care Co-morbidities:   advanced age, high BMI and Previous knee surgery    Personal Factors:   no deficits     high   Examination  Body Structures and Functions, activity limitations and participation restrictions that may impact the plan of care Body Regions:   lower extremities    Body Systems:    gross symmetry  ROM  strength  gross coordinated movement  balance    Participation Restrictions:   NA    Activity limitations:   Learning and applying knowledge  No deficits    General Tasks and Commands  {No deficits    Communication  No deficits    Mobility  walking    Self care  No deficits    Domestic Life  No deficits    Interactions/Relationships  No deficits    Life Areas  Unable to complete ADLs    Community and Social Life  No deficits         high   Clinical Presentation evolving clinical presentation with changing clinical characteristics moderate   Decision Making/ Complexity Score: moderate     GOALS: Short Term Goals:  2 to 4 weeks  1.Report decreased knee pain  < / =  2/10  to increase tolerance for exercise  2. Increase knee ROM to 0 degrees knee extension in order to be able to perform ADLs without difficulty.  3. Increase strength by 1/3 MMT grade in gross RLE  to increase tolerance for ADL and work activities.  4. Pt to tolerate HEP to improve ROM and independence with ADL's    Long Term Goals: 12 to 24 weeks  1.Report  decreased knee pain < / = 0/10  to increase tolerance for ADLs  2.Patient goal: Resume ADLs without knee pain  3.Increase strength to >/= 4+/5 in gross LLE  to increase tolerance for ADL and work activities.  4. Pt will report at CJ level (20-40% impaired) on FOTO knee to demonstrate increase in LE function with every day tasks.     Plan   Plan of care Certification: 8/6/2020 to 12/31/2020.    Outpatient Physical Therapy 2 times weekly for 10 weeks to include the following interventions: manual therapy, therapeutic exercise, therapeutic activities, and neuromuscular re-education.    Luigi Hope, PT, DPT

## 2020-08-08 NOTE — ANESTHESIA POST-OP PAIN MANAGEMENT
Called patient, verified PNC removed yesterday. Tip in tact.       Kaushik Quesada MD  Resident Physician, PGY3  Department of Anesthesiology

## 2020-08-08 NOTE — ADDENDUM NOTE
Addendum  created 08/08/20 1123 by Kaushik Quesada MD    Clinical Note Signed, Intraprocedure Event edited

## 2020-08-10 ENCOUNTER — CLINICAL SUPPORT (OUTPATIENT)
Dept: REHABILITATION | Facility: HOSPITAL | Age: 65
End: 2020-08-10
Payer: COMMERCIAL

## 2020-08-10 DIAGNOSIS — M25.561 ACUTE PAIN OF RIGHT KNEE: ICD-10-CM

## 2020-08-10 PROCEDURE — 97110 THERAPEUTIC EXERCISES: CPT

## 2020-08-10 NOTE — PROGRESS NOTES
"  Physical Therapy Daily Treatment Note     Name: Hilario Thomas  Clinic Number: [id number]    Therapy Diagnosis:   Encounter Diagnosis   Name Primary?    Acute pain of right knee      Physician: Mariel Quintanilla PA-C    Visit Date: 8/10/2020  Physician Orders: PT Eval and Treat   Medical Diagnosis from Referral:     M17.12 (ICD-10-CM) - Primary osteoarthritis of left knee   Z96.652 (ICD-10-CM) - Status post total left knee replacement            Evaluation Date: 8/6/2020  Authorization Period Expiration: 08/06/2021  Plan of Care Expiration: 12/31/2020  Visit # / Visits authorized: 1/ 1         Time In: 1030  Time Out: 1115  Total Billable Time: 45 minutes    Precautions: Standard    Subjective     Pt reports: Pain has reduced since the previous visit. He was able to complete his HEP and is able to walk with his SPC.    He was compliant with home exercise program.  Response to previous treatment: Reduced pain and swelling  Functional change: Able to walk with SPC    Pain: 3/10  Location: right knee      Objective   Knee Passive Range of Motion:    Right  Left    Flexion 75 120   Extension 0 0       Hilario received therapeutic exercises to develop strength, endurance, ROM and flexibility for 45 minutes including:  Heel prop 5"  Quad set w/ 1/2 towel roll w/ ESTIM for 10'  Supine SLR with 1/2 towel roll x 10  Heel slide 3'  Heel raises 3 x 10 BLE  Standing mini squat with bilateral hand hold  Seated knee flexion AAROM x 5'    Home Exercises Provided and Patient Education Provided     Education provided:   - Prognosis    Written Home Exercises Provided: yes.  Exercises were reviewed and Hilario was able to demonstrate them prior to the end of the session.  Hilario demonstrated good  understanding of the education provided.     See EMR under Patient Instructions for exercises provided prior visit.    Assessment     Patient shows improved knee ROM, quadriceps activation, and reduced swelling this session. He is " progressing well with treatment and is consistent with his HEP.     Hilario is progressing well towards his goals.   Pt prognosis is Excellent.     Pt will continue to benefit from skilled outpatient physical therapy to address the deficits listed in the problem list box on initial evaluation, provide pt/family education and to maximize pt's level of independence in the home and community environment.     Pt's spiritual, cultural and educational needs considered and pt agreeable to plan of care and goals.    Anticipated barriers to physical therapy: None    GOALS: Short Term Goals:  2 to 4 weeks  1.Report decreased knee pain  < / =  2/10  to increase tolerance for exercise  2. Increase knee ROM to 0 degrees knee extension in order to be able to perform ADLs without difficulty.  3. Increase strength by 1/3 MMT grade in gross RLE  to increase tolerance for ADL and work activities.  4. Pt to tolerate HEP to improve ROM and independence with ADL's     Long Term Goals: 12 to 24 weeks  1.Report decreased knee pain < / = 0/10  to increase tolerance for ADLs  2.Patient goal: Resume ADLs without knee pain  3.Increase strength to >/= 4+/5 in gross LLE  to increase tolerance for ADL and work activities.  4. Pt will report at CJ level (20-40% impaired) on FOTO knee to demonstrate increase in LE function with every day tasks.     Plan     Plan of care Certification: 8/6/2020 to 12/31/2020.     Outpatient Physical Therapy 2 times weekly for 10 weeks to include the following interventions: manual therapy, therapeutic exercise, therapeutic activities, and neuromuscular re-education.    Luigi Hope, PT

## 2020-08-14 ENCOUNTER — CLINICAL SUPPORT (OUTPATIENT)
Dept: REHABILITATION | Facility: HOSPITAL | Age: 65
End: 2020-08-14
Payer: COMMERCIAL

## 2020-08-14 DIAGNOSIS — M25.561 ACUTE PAIN OF RIGHT KNEE: ICD-10-CM

## 2020-08-14 PROCEDURE — 97110 THERAPEUTIC EXERCISES: CPT

## 2020-08-17 ENCOUNTER — CLINICAL SUPPORT (OUTPATIENT)
Dept: REHABILITATION | Facility: HOSPITAL | Age: 65
End: 2020-08-17
Payer: COMMERCIAL

## 2020-08-17 ENCOUNTER — OFFICE VISIT (OUTPATIENT)
Dept: ORTHOPEDICS | Facility: CLINIC | Age: 65
End: 2020-08-17
Payer: COMMERCIAL

## 2020-08-17 VITALS — BODY MASS INDEX: 28.43 KG/M2 | WEIGHT: 221.56 LBS | HEIGHT: 74 IN

## 2020-08-17 DIAGNOSIS — Z96.651 STATUS POST TOTAL RIGHT KNEE REPLACEMENT: Primary | ICD-10-CM

## 2020-08-17 DIAGNOSIS — M25.561 ACUTE PAIN OF RIGHT KNEE: ICD-10-CM

## 2020-08-17 PROCEDURE — 99999 PR PBB SHADOW E&M-EST. PATIENT-LVL III: CPT | Mod: PBBFAC,,, | Performed by: PHYSICIAN ASSISTANT

## 2020-08-17 PROCEDURE — 99024 POSTOP FOLLOW-UP VISIT: CPT | Mod: S$GLB,,, | Performed by: PHYSICIAN ASSISTANT

## 2020-08-17 PROCEDURE — 97110 THERAPEUTIC EXERCISES: CPT

## 2020-08-17 PROCEDURE — 99024 PR POST-OP FOLLOW-UP VISIT: ICD-10-PCS | Mod: S$GLB,,, | Performed by: PHYSICIAN ASSISTANT

## 2020-08-17 PROCEDURE — 99999 PR PBB SHADOW E&M-EST. PATIENT-LVL III: ICD-10-PCS | Mod: PBBFAC,,, | Performed by: PHYSICIAN ASSISTANT

## 2020-08-17 NOTE — PROGRESS NOTES
"  Physical Therapy Daily Treatment Note     Name: Hilaroi Thomas  Clinic Number: [id number]    Therapy Diagnosis:   Encounter Diagnosis   Name Primary?    Acute pain of right knee      Physician: Mariel Quintanilla PA-C    Visit Date: 8/17/2020  Physician Orders: PT Eval and Treat   Medical Diagnosis from Referral:     M17.12 (ICD-10-CM) - Primary osteoarthritis of left knee   Z96.652 (ICD-10-CM) - Status post total left knee replacement            Evaluation Date: 8/6/2020  Authorization Period Expiration: 08/06/2021  Plan of Care Expiration: 12/31/2020  Visit # / Visits authorized: 1/ 1         Time In: 0935  Time Out: 1030  Total Billable Time: 55 minutes    Precautions: Standard    Subjective     Pt reports: Minimal knee pain. Some stiffness persists.     He was compliant with home exercise program.  Response to previous treatment: Reduced pain and swelling  Functional change: Able to walk with SPC    Pain: 2/10  Location: right knee      Objective   Knee Passive Range of Motion:    Right  Left    Flexion 88 120   Extension 0 0       Hilario received therapeutic exercises to develop strength, endurance, ROM and flexibility for 55 minutes including:  Heel prop 5"  Quad set w/ 1/2 towel roll 3 x 10 w/ 5" hold  Supine SLR with 1/2 towel roll 3 x 10  Seated knee flexion AAROM  Heel slides 3'  Heel raises 3 x 10 BLE  Seated knee flexion AAROM x 5'  Rocking on bike 10'    Home Exercises Provided and Patient Education Provided     Education provided:   - Prognosis    Written Home Exercises Provided: yes.  Exercises were reviewed and Hilario was able to demonstrate them prior to the end of the session.  Hilario demonstrated good  understanding of the education provided.     See EMR under Patient Instructions for exercises provided prior visit.    Assessment     Patient continues to progress very well with treatment. Swelling is decreasing and quadriceps activation continues to improve. SLR with minor lag and able to " complete quad set with only small towel roll behind the knee. Passive knee extension is WNL and flexion is ~ 90 degrees, likely limited by remaining joint swelling.    Hilario is progressing well towards his goals.   Pt prognosis is Excellent.     Pt will continue to benefit from skilled outpatient physical therapy to address the deficits listed in the problem list box on initial evaluation, provide pt/family education and to maximize pt's level of independence in the home and community environment.     Pt's spiritual, cultural and educational needs considered and pt agreeable to plan of care and goals.    Anticipated barriers to physical therapy: None    GOALS: Short Term Goals:  2 to 4 weeks  1.Report decreased knee pain  < / =  2/10  to increase tolerance for exercise  2. Increase knee ROM to 0 degrees knee extension in order to be able to perform ADLs without difficulty.  3. Increase strength by 1/3 MMT grade in gross RLE  to increase tolerance for ADL and work activities.  4. Pt to tolerate HEP to improve ROM and independence with ADL's     Long Term Goals: 12 to 24 weeks  1.Report decreased knee pain < / = 0/10  to increase tolerance for ADLs  2.Patient goal: Resume ADLs without knee pain  3.Increase strength to >/= 4+/5 in gross LLE  to increase tolerance for ADL and work activities.  4. Pt will report at CJ level (20-40% impaired) on FOTO knee to demonstrate increase in LE function with every day tasks.     Plan     Plan of care Certification: 8/6/2020 to 12/31/2020.     Outpatient Physical Therapy 2 times weekly for 10 weeks to include the following interventions: manual therapy, therapeutic exercise, therapeutic activities, and neuromuscular re-education.    Luigi Hope, PT, DPT

## 2020-08-18 NOTE — PROGRESS NOTES
"Hilario Thomas presents for initial post-operative visit following a right FILOMENA total knee arthroplasty performed by Dr. Centeno on 8/25/2020. He is doing very well. He is in outpatient PT at [facility]. Off of pain medication.     Exam:   Height 6' 2" (1.88 m), weight 100.5 kg (221 lb 9 oz).   Ambulating well with assistive device.  Incision is clean and dry without drainage or erythema.   ROM:0-100    Initial post-operative radiographs reviewed today revealing a well fixed and aligned prosthesis.    A/P:  2 weeks s/p right total knee replacement    - The patient was advised to keep the incision clean and dry for the next 24 hours after which he may wash the area with antibacterial soap in the shower. Will not submerge until the incision is completely healed.   - Outpatient PT ongoing at [facility]  - Continue ASA for 1 month from surgery.  - Reviewed antibiotic prophylaxis   - Follow up in 4 weeks with new x-rays. Pt will call clinic with problems/concerns.     "

## 2020-08-21 ENCOUNTER — CLINICAL SUPPORT (OUTPATIENT)
Dept: REHABILITATION | Facility: HOSPITAL | Age: 65
End: 2020-08-21
Payer: COMMERCIAL

## 2020-08-21 DIAGNOSIS — M25.561 ACUTE PAIN OF RIGHT KNEE: ICD-10-CM

## 2020-08-21 PROCEDURE — 97110 THERAPEUTIC EXERCISES: CPT

## 2020-08-21 NOTE — PROGRESS NOTES
"  Physical Therapy Daily Treatment Note     Name: Hilario Thomas  Clinic Number: [id number]    Therapy Diagnosis:   Encounter Diagnosis   Name Primary?    Acute pain of right knee      Physician: Mariel Quintanilla PA-C    Visit Date: 8/21/2020  Physician Orders: PT Eval and Treat   Medical Diagnosis from Referral:     M17.12 (ICD-10-CM) - Primary osteoarthritis of left knee   Z96.652 (ICD-10-CM) - Status post total left knee replacement            Evaluation Date: 8/6/2020  Authorization Period Expiration: 08/06/2021  Plan of Care Expiration: 12/31/2020  Visit # / Visits authorized: 5/ 20         Time In: 0845  Time Out: 0940  Total Billable Time: 55 minutes    Precautions: Standard    Subjective     Pt reports: No reports of pain and he continues to progress well.    He was compliant with home exercise program.  Response to previous treatment: Reduced pain and swelling  Functional change: Able to walk with SPC    Pain: 2/10  Location: right knee      Objective   Knee Passive Range of Motion:    Right  Left    Flexion 95 120   Extension 5 hyper 0       Hilario received therapeutic exercises to develop strength, endurance, ROM and flexibility for 55 minutes including:  Heel prop 5"  Quad set w/ 1/2 towel roll 3 x 10 w/ 5" hold  Supine SLR with 1/2 towel roll 3 x 10  Seated knee flexion AAROM  Heel slides 3'  Heel raises 3 x 10 BLE  Seated knee flexion AAROM x 5' chair with contract relax techniques  Rocking on bike 10'  Mini squat with BL hand hold x 15  Seated HS curl against manual resistance.      Home Exercises Provided and Patient Education Provided     Education provided:   - Prognosis    Written Home Exercises Provided: yes.  Exercises were reviewed and Hilario was able to demonstrate them prior to the end of the session.  Hilario demonstrated good  understanding of the education provided.     See EMR under Patient Instructions for exercises provided prior visit.    Assessment     Patient continues to " progress well. Instructed to focus HEP on AAROM knee flexion and TKE strengthening. Continue focus on improving strength and ROM. May benefit from gait training due to decreased knee flexion with gait.     Hilario is progressing well towards his goals.   Pt prognosis is Excellent.     Pt will continue to benefit from skilled outpatient physical therapy to address the deficits listed in the problem list box on initial evaluation, provide pt/family education and to maximize pt's level of independence in the home and community environment.     Pt's spiritual, cultural and educational needs considered and pt agreeable to plan of care and goals.    Anticipated barriers to physical therapy: None    GOALS: Short Term Goals:  2 to 4 weeks  1.Report decreased knee pain  < / =  2/10  to increase tolerance for exercise  2. Increase knee ROM to 0 degrees knee extension in order to be able to perform ADLs without difficulty.  3. Increase strength by 1/3 MMT grade in gross RLE  to increase tolerance for ADL and work activities.  4. Pt to tolerate HEP to improve ROM and independence with ADL's     Long Term Goals: 12 to 24 weeks  1.Report decreased knee pain < / = 0/10  to increase tolerance for ADLs  2.Patient goal: Resume ADLs without knee pain  3.Increase strength to >/= 4+/5 in gross LLE  to increase tolerance for ADL and work activities.  4. Pt will report at CJ level (20-40% impaired) on FOTO knee to demonstrate increase in LE function with every day tasks.     Plan     Plan of care Certification: 8/6/2020 to 12/31/2020.    Assess and treat gait deviations if required.     Outpatient Physical Therapy 2 times weekly for 10 weeks to include the following interventions: manual therapy, therapeutic exercise, therapeutic activities, and neuromuscular re-education.    Luigi Hope, PT, DPT

## 2020-08-24 ENCOUNTER — CLINICAL SUPPORT (OUTPATIENT)
Dept: REHABILITATION | Facility: HOSPITAL | Age: 65
End: 2020-08-24
Payer: COMMERCIAL

## 2020-08-24 DIAGNOSIS — M25.561 ACUTE PAIN OF RIGHT KNEE: ICD-10-CM

## 2020-08-24 PROCEDURE — 97530 THERAPEUTIC ACTIVITIES: CPT | Mod: CQ

## 2020-08-24 PROCEDURE — 97110 THERAPEUTIC EXERCISES: CPT | Mod: CQ

## 2020-08-24 NOTE — PROGRESS NOTES
"  Physical Therapy Daily Treatment Note     Name: Hilario Thomas  Clinic Number: [id number]    Therapy Diagnosis:   Encounter Diagnosis   Name Primary?    Acute pain of right knee      Physician: Mariel Quintanilla PA-C    Visit Date: 8/24/2020  Physician Orders: PT Eval and Treat   Medical Diagnosis from Referral:     M17.12 (ICD-10-CM) - Primary osteoarthritis of left knee   Z96.652 (ICD-10-CM) - Status post total left knee replacement            Evaluation Date: 8/6/2020  Authorization Period Expiration: 08/06/2021  Plan of Care Expiration: 12/31/2020  Visit # / Visits authorized: 5/20       Time In: 0735  Time Out: 0830  Total Billable Time: 53 minutes    Precautions: Standard    Subjective     Pt reports: Pt returns to therapy this am with c/o R knee stiffness upon entry. He reports decreased swelling and compliance with HEP and compression stocking.     He was compliant with home exercise program.  Response to previous treatment: Reduced pain and swelling  Functional change: Able to walk with SPC    Pain: 2/10  Location: right knee      Objective   Knee Passive Range of Motion:    Right  Left    Flexion 95 120   Extension 5 hyper 0       Hilario received therapeutic exercises to develop strength, endurance, ROM and flexibility for 45 minutes including:    Heel prop 3# x 3'  TKE c 1/2 foam 5" hold x 3'  TKE c small bolster 3# 5" hold x 3'  Seated SLR 2 x 10  Upright bike rocking x 8'  Seated knee flexion AAROM  Heel slides x 5'  DL shuttle (3.0 bands) 3 x 10  SL shuttle (1.5 bands) 2 x 8    NP:  Heel raises 3 x 10 BLE  Mini squat with BL hand hold x 15  Seated HS curl against manual resistance.    Hilario participated in dynamic functional therapeutic activities to improve functional performance for 08 minutes including:    Small hurdles for increased flexion during gait  Gait training for R knee flexion during swing phase      Home Exercises Provided and Patient Education Provided     Education provided:   - " Prognosis    Written Home Exercises Provided: yes.  Exercises were reviewed and Hilario was able to demonstrate them prior to the end of the session.  Hilario demonstrated good  understanding of the education provided.     See EMR under Patient Instructions for exercises provided prior visit.    Assessment     Pt was able to complete all exercises including progressions with no complaints. Flexion unchanged since previous tx. Pt had difficulty negotiating small hurdles and was unable to make full revolution on bike d/t lack of flexion. No concerns with ext at this time. Encouraged continued compliance with HEP with emphasis on flexion; pt voiced understanding.    Hilario is progressing well towards his goals.   Pt prognosis is Excellent.     Pt will continue to benefit from skilled outpatient physical therapy to address the deficits listed in the problem list box on initial evaluation, provide pt/family education and to maximize pt's level of independence in the home and community environment.     Pt's spiritual, cultural and educational needs considered and pt agreeable to plan of care and goals.    Anticipated barriers to physical therapy: None    GOALS: Short Term Goals:  2 to 4 weeks  1.Report decreased knee pain  < / =  2/10  to increase tolerance for exercise  2. Increase knee ROM to 0 degrees knee extension in order to be able to perform ADLs without difficulty.  3. Increase strength by 1/3 MMT grade in gross RLE  to increase tolerance for ADL and work activities.  4. Pt to tolerate HEP to improve ROM and independence with ADL's     Long Term Goals: 12 to 24 weeks  1.Report decreased knee pain < / = 0/10  to increase tolerance for ADLs  2.Patient goal: Resume ADLs without knee pain  3.Increase strength to >/= 4+/5 in gross LLE  to increase tolerance for ADL and work activities.  4. Pt will report at CJ level (20-40% impaired) on FOTO knee to demonstrate increase in LE function with every day tasks.     Plan    Plan of care Certification: 8/6/2020 to 12/31/2020.    Assess and treat gait deviations if required.     Outpatient Physical Therapy 2 times weekly for 10 weeks to include the following interventions: manual therapy, therapeutic exercise, therapeutic activities, and neuromuscular re-education.    Leelee Ty PTA, DPT

## 2020-08-28 ENCOUNTER — CLINICAL SUPPORT (OUTPATIENT)
Dept: REHABILITATION | Facility: HOSPITAL | Age: 65
End: 2020-08-28
Payer: COMMERCIAL

## 2020-08-28 DIAGNOSIS — M25.561 ACUTE PAIN OF RIGHT KNEE: ICD-10-CM

## 2020-08-28 PROCEDURE — 97530 THERAPEUTIC ACTIVITIES: CPT

## 2020-08-28 PROCEDURE — 97110 THERAPEUTIC EXERCISES: CPT

## 2020-08-28 NOTE — PROGRESS NOTES
"  Physical Therapy Daily Treatment Note     Name: Hilario Thomas  Clinic Number: [id number]    Therapy Diagnosis:   Encounter Diagnosis   Name Primary?    Acute pain of right knee      Physician: Mariel Quintanilla PA-C    Visit Date: 8/28/2020  Physician Orders: PT Eval and Treat   Medical Diagnosis from Referral:     M17.12 (ICD-10-CM) - Primary osteoarthritis of left knee   Z96.652 (ICD-10-CM) - Status post total left knee replacement            Evaluation Date: 8/6/2020  Authorization Period Expiration: 08/06/2021  Plan of Care Expiration: 12/31/2020  Visit # / Visits authorized: 6/20       Time In: 0900  Time Out: 0955  Total Billable Time: 55 minutes    Precautions: Standard    Subjective     Pt reports: He has been able to ride his stationary cycle at home.    He was compliant with home exercise program.  Response to previous treatment: Reduced pain and swelling  Functional change: Able to walk with SPC    Pain: 2/10  Location: right knee      Objective   Knee Passive Range of Motion:    Right  Left    Flexion 95 120   Extension 5 hyper 0       Hilairo received therapeutic exercises to develop strength, endurance, ROM and flexibility for 45 minutes including:    Upright bike full revolutions for repeated knee flexion 10'  TKE c 1/2 foam 5" hold x 3'  TKE c small bolster 3# 5" hold x 3'  Seated SLR 3 x 10  LAQ with 5 lbs 4 x 8  Heel slides x 3'  DL shuttle (3.0 bands) 3 x 10      NP:  Heel raises 3 x 10 BLE  Mini squat with BL hand hold x 15  Seated HS curl against manual resistance.    Hilario participated in dynamic functional therapeutic activities to improve functional performance for 10 minutes including:    Small hurdles for increased flexion during gait  Gait training for R knee flexion during swing phase      Home Exercises Provided and Patient Education Provided     Education provided:   - Prognosis    Written Home Exercises Provided: yes.  Exercises were reviewed and Hilario was able to demonstrate " them prior to the end of the session.  Hilario demonstrated good  understanding of the education provided.     See EMR under Patient Instructions for exercises provided prior visit.    Assessment     Patient able to progress to full revolutions on bike. Knee flexion progressing. Decreased knee flexion with gait remains. Requires cuing for knee flexion. Able to complete hurdles with cane for support.     Hilario is progressing well towards his goals.   Pt prognosis is Excellent.     Pt will continue to benefit from skilled outpatient physical therapy to address the deficits listed in the problem list box on initial evaluation, provide pt/family education and to maximize pt's level of independence in the home and community environment.     Pt's spiritual, cultural and educational needs considered and pt agreeable to plan of care and goals.    Anticipated barriers to physical therapy: None    GOALS: Short Term Goals:  2 to 4 weeks  1.Report decreased knee pain  < / =  2/10  to increase tolerance for exercise  2. Increase knee ROM to 0 degrees knee extension in order to be able to perform ADLs without difficulty.  3. Increase strength by 1/3 MMT grade in gross RLE  to increase tolerance for ADL and work activities.  4. Pt to tolerate HEP to improve ROM and independence with ADL's     Long Term Goals: 12 to 24 weeks  1.Report decreased knee pain < / = 0/10  to increase tolerance for ADLs  2.Patient goal: Resume ADLs without knee pain  3.Increase strength to >/= 4+/5 in gross LLE  to increase tolerance for ADL and work activities.  4. Pt will report at CJ level (20-40% impaired) on FOTO knee to demonstrate increase in LE function with every day tasks.     Plan   Plan of care Certification: 8/6/2020 to 12/31/2020.    Assess and treat gait deviations if required.     Outpatient Physical Therapy 2 times weekly for 10 weeks to include the following interventions: manual therapy, therapeutic exercise, therapeutic activities,  and neuromuscular re-education.    Luigi Hope, PT, DPT

## 2020-08-31 ENCOUNTER — CLINICAL SUPPORT (OUTPATIENT)
Dept: REHABILITATION | Facility: HOSPITAL | Age: 65
End: 2020-08-31
Attending: FAMILY MEDICINE
Payer: COMMERCIAL

## 2020-08-31 DIAGNOSIS — M25.561 ACUTE PAIN OF RIGHT KNEE: ICD-10-CM

## 2020-08-31 PROCEDURE — 97530 THERAPEUTIC ACTIVITIES: CPT

## 2020-08-31 PROCEDURE — 97110 THERAPEUTIC EXERCISES: CPT

## 2020-08-31 NOTE — PROGRESS NOTES
"  Physical Therapy Daily Treatment Note     Name: Hilario Thomas  Clinic Number: [id number]    Therapy Diagnosis:   Encounter Diagnosis   Name Primary?    Acute pain of right knee      Physician: Mariel Quintanilla PA-C    Visit Date: 8/31/2020  Physician Orders: PT Eval and Treat   Medical Diagnosis from Referral:     M17.12 (ICD-10-CM) - Primary osteoarthritis of left knee   Z96.652 (ICD-10-CM) - Status post total left knee replacement            Evaluation Date: 8/6/2020  Authorization Period Expiration: 08/06/2021  Plan of Care Expiration: 12/31/2020  Visit # / Visits authorized: 7/20       Time In: 0730  Time Out: 0820  Total Billable Time: 50 minutes    Precautions: Standard    Subjective     Pt reports: No new complaints of pain.     He was compliant with home exercise program.  Response to previous treatment: Reduced pain and swelling  Functional change: Able to walk with SPC    Pain: 1/10  Location: right knee      Objective   Knee Passive Range of Motion:    Right  Left    Flexion 110 120   Extension 5 hyper 0       Hilario received therapeutic exercises to develop strength, endurance, ROM and flexibility for 40 minutes including:    Upright bike full revolutions for repeated knee flexion 10'  TKE c towel 5" hold x 30  Supine SLR 3 x 8   Seated SLR 2 x 8  LAQ with 10 lbs 4 x 8  Bridges 3 x 10  Heel raises 3 x 10  Leg press 80 lbs 4 x 8      NP:  Heel raises 3 x 10 BLE  Mini squat with BL hand hold x 15  Seated HS curl against manual resistance.    Hilario participated in dynamic functional therapeutic activities to improve functional performance for 10 minutes including:    Small hurdles for increased flexion during gait  Gait training for R knee flexion during swing phase      Home Exercises Provided and Patient Education Provided     Education provided:   - Prognosis    Written Home Exercises Provided: yes.  Exercises were reviewed and Hilario was able to demonstrate them prior to the end of the " session.  Hilario demonstrated good  understanding of the education provided.     See EMR under Patient Instructions for exercises provided prior visit.    Assessment     Patient continues to progress well without issue. Continue focus on quadriceps strength and improving flexion with gait.     Hilario is progressing well towards his goals.   Pt prognosis is Excellent.     Pt will continue to benefit from skilled outpatient physical therapy to address the deficits listed in the problem list box on initial evaluation, provide pt/family education and to maximize pt's level of independence in the home and community environment.     Pt's spiritual, cultural and educational needs considered and pt agreeable to plan of care and goals.    Anticipated barriers to physical therapy: None    GOALS: Short Term Goals:  2 to 4 weeks  1.Report decreased knee pain  < / =  2/10  to increase tolerance for exercise  2. Increase knee ROM to 0 degrees knee extension in order to be able to perform ADLs without difficulty.  3. Increase strength by 1/3 MMT grade in gross RLE  to increase tolerance for ADL and work activities.  4. Pt to tolerate HEP to improve ROM and independence with ADL's     Long Term Goals: 12 to 24 weeks  1.Report decreased knee pain < / = 0/10  to increase tolerance for ADLs  2.Patient goal: Resume ADLs without knee pain  3.Increase strength to >/= 4+/5 in gross LLE  to increase tolerance for ADL and work activities.  4. Pt will report at CJ level (20-40% impaired) on FOTO knee to demonstrate increase in LE function with every day tasks.     Plan   Plan of care Certification: 8/6/2020 to 12/31/2020.    Assess and treat gait deviations if required.     Outpatient Physical Therapy 2 times weekly for 10 weeks to include the following interventions: manual therapy, therapeutic exercise, therapeutic activities, and neuromuscular re-education.    Luigi Hope, PT, DPT

## 2020-09-04 ENCOUNTER — CLINICAL SUPPORT (OUTPATIENT)
Dept: REHABILITATION | Facility: HOSPITAL | Age: 65
End: 2020-09-04
Attending: FAMILY MEDICINE
Payer: COMMERCIAL

## 2020-09-04 DIAGNOSIS — M25.561 ACUTE PAIN OF RIGHT KNEE: ICD-10-CM

## 2020-09-04 PROCEDURE — 97530 THERAPEUTIC ACTIVITIES: CPT

## 2020-09-04 PROCEDURE — 97110 THERAPEUTIC EXERCISES: CPT

## 2020-09-04 NOTE — PROGRESS NOTES
"  Physical Therapy Daily Treatment Note     Name: Hilario Thomas  Clinic Number: [id number]    Therapy Diagnosis:   Encounter Diagnosis   Name Primary?    Acute pain of right knee      Physician: Mariel Quintanilla PA-C    Visit Date: 9/4/2020  Physician Orders: PT Eval and Treat   Medical Diagnosis from Referral:     M17.12 (ICD-10-CM) - Primary osteoarthritis of left knee   Z96.652 (ICD-10-CM) - Status post total left knee replacement            Evaluation Date: 8/6/2020  Authorization Period Expiration: 08/06/2021  Plan of Care Expiration: 12/31/2020  Visit # / Visits authorized: 8/20       Time In: 0750  Time Out: 0900  Total Billable Time: 60 minutes    Precautions: Standard    Subjective     Pt reports: Slight stiffness in his knee. Otherwise he is progressing very well.    He was compliant with home exercise program.  Response to previous treatment: Reduced pain and swelling  Functional change: Able to walk with SPC    Pain: 1/10  Location: right knee      Objective   Knee Passive Range of Motion:    Right  Left    Flexion 110 120   Extension 5 hyper 0       Hilario received therapeutic exercises to develop strength, endurance, ROM and flexibility for 50 minutes including:    Upright bike full revolutions for repeated knee flexion 10'  TKE c towel 5" hold x 30  Supine SLR 3 x 10  SAQ with 3 lbs 3 x 10  LAQ with 10 lbs 4 x 8  Bridges 3 x 10  Heel raises 3 x 10  Leg press 80 lbs 4 x 8  Lateral walks 5 laps   SLB 5 x 5" ea      NP:  Heel raises 3 x 10 BLE  Mini squat with BL hand hold x 15  Seated HS curl against manual resistance.    Hilario participated in dynamic functional therapeutic activities to improve functional performance for 10 minutes including:    Small hurdles for increased flexion during gait  Gait training for R knee flexion during swing phase      Home Exercises Provided and Patient Education Provided     Education provided:   - Prognosis    Written Home Exercises Provided: yes.  Exercises " were reviewed and Hilario was able to demonstrate them prior to the end of the session.  Hilario demonstrated good  understanding of the education provided.     See EMR under Patient Instructions for exercises provided prior visit.    Assessment     Patient requires progression with hip strengthening and TKE strengthening. ROM progressing well.     Hilario is progressing well towards his goals.   Pt prognosis is Excellent.     Pt will continue to benefit from skilled outpatient physical therapy to address the deficits listed in the problem list box on initial evaluation, provide pt/family education and to maximize pt's level of independence in the home and community environment.     Pt's spiritual, cultural and educational needs considered and pt agreeable to plan of care and goals.    Anticipated barriers to physical therapy: None    GOALS: Short Term Goals:  2 to 4 weeks  1.Report decreased knee pain  < / =  2/10  to increase tolerance for exercise  2. Increase knee ROM to 0 degrees knee extension in order to be able to perform ADLs without difficulty.  3. Increase strength by 1/3 MMT grade in gross RLE  to increase tolerance for ADL and work activities.  4. Pt to tolerate HEP to improve ROM and independence with ADL's     Long Term Goals: 12 to 24 weeks  1.Report decreased knee pain < / = 0/10  to increase tolerance for ADLs  2.Patient goal: Resume ADLs without knee pain  3.Increase strength to >/= 4+/5 in gross LLE  to increase tolerance for ADL and work activities.  4. Pt will report at CJ level (20-40% impaired) on FOTO knee to demonstrate increase in LE function with every day tasks.     Plan   Plan of care Certification: 8/6/2020 to 12/31/2020.    Assess and treat gait deviations if required.     Outpatient Physical Therapy 2 times weekly for 10 weeks to include the following interventions: manual therapy, therapeutic exercise, therapeutic activities, and neuromuscular re-education.    Luigi Hope, PT,  DPT

## 2020-09-08 ENCOUNTER — CLINICAL SUPPORT (OUTPATIENT)
Dept: REHABILITATION | Facility: HOSPITAL | Age: 65
End: 2020-09-08
Attending: FAMILY MEDICINE
Payer: COMMERCIAL

## 2020-09-08 DIAGNOSIS — M25.561 ACUTE PAIN OF RIGHT KNEE: ICD-10-CM

## 2020-09-08 PROCEDURE — 97110 THERAPEUTIC EXERCISES: CPT

## 2020-09-08 NOTE — PROGRESS NOTES
"  Physical Therapy Daily Treatment Note     Name: Hilario Thomas  Clinic Number: [id number]    Therapy Diagnosis:   Encounter Diagnosis   Name Primary?    Acute pain of right knee      Physician: Mariel Quintanilla PA-C    Visit Date: 9/8/2020  Physician Orders: PT Eval and Treat   Medical Diagnosis from Referral:     M17.12 (ICD-10-CM) - Primary osteoarthritis of left knee   Z96.652 (ICD-10-CM) - Status post total left knee replacement            Evaluation Date: 8/6/2020  Authorization Period Expiration: 08/06/2021  Plan of Care Expiration: 12/31/2020  Visit # / Visits authorized: 9/20       Time In: 1345  Time Out: 1435  Total Billable Time: 50 minutes    Precautions: Standard    Subjective     Pt reports: He is able to complete all exercises at home with minimal issues.     He was compliant with home exercise program.  Response to previous treatment: Reduced pain and swelling  Functional change: Able to walk with SPC    Pain: 1/10  Location: right knee      Objective   Knee Passive Range of Motion:    Right  Left    Flexion 110 120   Extension 5 hyper 0       Hilario received therapeutic exercises to develop strength, endurance, ROM and flexibility for 50 minutes including:    Upright bike full revolutions for repeated knee flexion 10'  TKE c towel 5" hold x 30  Supine SLR 3 x 10  SAQ with 3 lbs 4 x 10  LAQ with 10 lbs 4 x 8  Bridges 3 x 10  Heel raises 3 x 10  Leg press 80 lbs 4 x 8  Lateral walks 5 laps - GTB  SLB 5 x 5" ea  Step ups 6" 20x   Eccentric step downs 2 x 10 ea      NP:  Heel raises 3 x 10 BLE  Mini squat with BL hand hold x 15  Seated HS curl against manual resistance.    Hilario participated in dynamic functional therapeutic activities to improve functional performance for 0 minutes including:        Home Exercises Provided and Patient Education Provided     Education provided:   - Prognosis    Written Home Exercises Provided: yes.  Exercises were reviewed and Hilario was able to demonstrate " them prior to the end of the session.  Hilario demonstrated good  understanding of the education provided.     See EMR under Patient Instructions for exercises provided prior visit.    Assessment     Very challenged by eccentric step downs. Terminal knee extension strength most limited. As quadriceps strength progresses will incorporate more hip strengthening.      Hilario is progressing well towards his goals.   Pt prognosis is Excellent.     Pt will continue to benefit from skilled outpatient physical therapy to address the deficits listed in the problem list box on initial evaluation, provide pt/family education and to maximize pt's level of independence in the home and community environment.     Pt's spiritual, cultural and educational needs considered and pt agreeable to plan of care and goals.    Anticipated barriers to physical therapy: None    GOALS: Short Term Goals:  2 to 4 weeks  1.Report decreased knee pain  < / =  2/10  to increase tolerance for exercise  2. Increase knee ROM to 0 degrees knee extension in order to be able to perform ADLs without difficulty.  3. Increase strength by 1/3 MMT grade in gross RLE  to increase tolerance for ADL and work activities.  4. Pt to tolerate HEP to improve ROM and independence with ADL's     Long Term Goals: 12 to 24 weeks  1.Report decreased knee pain < / = 0/10  to increase tolerance for ADLs  2.Patient goal: Resume ADLs without knee pain  3.Increase strength to >/= 4+/5 in gross LLE  to increase tolerance for ADL and work activities.  4. Pt will report at CJ level (20-40% impaired) on FOTO knee to demonstrate increase in LE function with every day tasks.     Plan   Plan of care Certification: 8/6/2020 to 12/31/2020.    Assess and treat gait deviations if required.     Outpatient Physical Therapy 2 times weekly for 10 weeks to include the following interventions: manual therapy, therapeutic exercise, therapeutic activities, and neuromuscular re-education.    Luigi  Herminia, PT, DPT

## 2020-09-11 ENCOUNTER — CLINICAL SUPPORT (OUTPATIENT)
Dept: REHABILITATION | Facility: HOSPITAL | Age: 65
End: 2020-09-11
Attending: FAMILY MEDICINE
Payer: COMMERCIAL

## 2020-09-11 ENCOUNTER — TELEPHONE (OUTPATIENT)
Dept: ORTHOPEDICS | Facility: CLINIC | Age: 65
End: 2020-09-11

## 2020-09-11 DIAGNOSIS — M25.561 ACUTE PAIN OF RIGHT KNEE: ICD-10-CM

## 2020-09-11 DIAGNOSIS — Z96.651 STATUS POST TOTAL RIGHT KNEE REPLACEMENT: Primary | ICD-10-CM

## 2020-09-11 PROCEDURE — 97110 THERAPEUTIC EXERCISES: CPT

## 2020-09-11 NOTE — PROGRESS NOTES
"  Physical Therapy Daily Treatment Note     Name: Hilario Thomas  Clinic Number: [id number]    Therapy Diagnosis:   Encounter Diagnosis   Name Primary?    Acute pain of right knee      Physician: Mariel Quintanilla PA-C    Visit Date: 9/11/2020  Physician Orders: PT Eval and Treat   Medical Diagnosis from Referral:     M17.12 (ICD-10-CM) - Primary osteoarthritis of left knee   Z96.652 (ICD-10-CM) - Status post total left knee replacement            Evaluation Date: 8/6/2020  Authorization Period Expiration: 08/06/2021  Plan of Care Expiration: 12/31/2020  Visit # / Visits authorized: 10/20       Time In: 0815  Time Out: 0900  Total Billable Time: 45 minutes    Precautions: Standard    Subjective     Pt reports: No complaints of knee pain.    He was compliant with home exercise program.  Response to previous treatment: Reduced pain and swelling  Functional change: Walking without AD    Pain: 0/10  Location: right knee      Objective   Knee Passive Range of Motion:    Right  Left    Flexion 120 120   Extension 5 hyper 0       Hilario received therapeutic exercises to develop strength, endurance, ROM and flexibility for 45 minutes including:    Upright bike full revolutions for repeated knee flexion 10'  TKE c towel 5" hold x 30  Supine SLR 3 x 10, short range to focus on terminal knee extension  SAQ with  lbs 4 x 10  LAQ with 10 lbs 4 x 8  Heel raises 3 x 10  Lateral walks 5 laps - GTB  Step ups 6" 20x   Eccentric step downs 3 x 10 ea, 4 in with stick for balance      NP:  Heel raises 3 x 10 BLE  Mini squat with BL hand hold x 15  Seated HS curl against manual resistance.    Hilario participated in dynamic functional therapeutic activities to improve functional performance for 0 minutes including:        Home Exercises Provided and Patient Education Provided     Education provided:   - Prognosis    Written Home Exercises Provided: yes.  Exercises were reviewed and Hilario was able to demonstrate them prior to the " end of the session.  Hilario demonstrated good  understanding of the education provided.     See EMR under Patient Instructions for exercises provided prior visit.    Assessment     Able to achieve terminal knee extension actively but is weak in this range. Improving active terminal knee extension strength is of primary importance with secondary progression to hip control. Gait deviations are driven from hip weakness at this point.      Hilario is progressing well towards his goals.   Pt prognosis is Excellent.     Pt will continue to benefit from skilled outpatient physical therapy to address the deficits listed in the problem list box on initial evaluation, provide pt/family education and to maximize pt's level of independence in the home and community environment.     Pt's spiritual, cultural and educational needs considered and pt agreeable to plan of care and goals.    Anticipated barriers to physical therapy: None    GOALS: Short Term Goals:  2 to 4 weeks  1.Report decreased knee pain  < / =  2/10  to increase tolerance for exercise  2. Increase knee ROM to 0 degrees knee extension in order to be able to perform ADLs without difficulty.  3. Increase strength by 1/3 MMT grade in gross RLE  to increase tolerance for ADL and work activities.  4. Pt to tolerate HEP to improve ROM and independence with ADL's     Long Term Goals: 12 to 24 weeks  1.Report decreased knee pain < / = 0/10  to increase tolerance for ADLs  2.Patient goal: Resume ADLs without knee pain  3.Increase strength to >/= 4+/5 in gross LLE  to increase tolerance for ADL and work activities.  4. Pt will report at CJ level (20-40% impaired) on FOTO knee to demonstrate increase in LE function with every day tasks.     Plan   Plan of care Certification: 8/6/2020 to 12/31/2020.    Assess and treat gait deviations if required.     Outpatient Physical Therapy 2 times weekly for 10 weeks to include the following interventions: manual therapy, therapeutic  exercise, therapeutic activities, and neuromuscular re-education.    Luigi Hope, PT, DPT

## 2020-09-11 NOTE — TELEPHONE ENCOUNTER
I left a message for patient about his appointment time and x-ray time o Monday. I let him know that he can go get his x-ray first and then come to his pre-op appointment.   no

## 2020-09-14 ENCOUNTER — OFFICE VISIT (OUTPATIENT)
Dept: ORTHOPEDICS | Facility: CLINIC | Age: 65
End: 2020-09-14
Payer: COMMERCIAL

## 2020-09-14 ENCOUNTER — HOSPITAL ENCOUNTER (OUTPATIENT)
Dept: RADIOLOGY | Facility: HOSPITAL | Age: 65
Discharge: HOME | End: 2020-09-14
Attending: PHYSICIAN ASSISTANT
Payer: COMMERCIAL

## 2020-09-14 ENCOUNTER — CLINICAL SUPPORT (OUTPATIENT)
Dept: REHABILITATION | Facility: HOSPITAL | Age: 65
End: 2020-09-14
Attending: FAMILY MEDICINE
Payer: COMMERCIAL

## 2020-09-14 VITALS — BODY MASS INDEX: 28.19 KG/M2 | WEIGHT: 219.69 LBS | HEIGHT: 74 IN

## 2020-09-14 DIAGNOSIS — M25.561 ACUTE PAIN OF RIGHT KNEE: ICD-10-CM

## 2020-09-14 DIAGNOSIS — Z96.651 STATUS POST TOTAL RIGHT KNEE REPLACEMENT: Primary | ICD-10-CM

## 2020-09-14 DIAGNOSIS — Z96.651 STATUS POST TOTAL RIGHT KNEE REPLACEMENT: ICD-10-CM

## 2020-09-14 DIAGNOSIS — T81.89XA SUTURE REACTION, INITIAL ENCOUNTER: ICD-10-CM

## 2020-09-14 PROCEDURE — 99999 PR PBB SHADOW E&M-EST. PATIENT-LVL III: ICD-10-PCS | Mod: PBBFAC,,, | Performed by: PHYSICIAN ASSISTANT

## 2020-09-14 PROCEDURE — 73560 XR KNEE ORTHO RIGHT: ICD-10-PCS | Mod: 26,LT,, | Performed by: RADIOLOGY

## 2020-09-14 PROCEDURE — 73562 X-RAY EXAM OF KNEE 3: CPT | Mod: TC,RT

## 2020-09-14 PROCEDURE — 97110 THERAPEUTIC EXERCISES: CPT

## 2020-09-14 PROCEDURE — 73560 X-RAY EXAM OF KNEE 1 OR 2: CPT | Mod: 26,LT,, | Performed by: RADIOLOGY

## 2020-09-14 PROCEDURE — 99024 PR POST-OP FOLLOW-UP VISIT: ICD-10-PCS | Mod: S$GLB,,, | Performed by: PHYSICIAN ASSISTANT

## 2020-09-14 PROCEDURE — 73562 XR KNEE ORTHO RIGHT: ICD-10-PCS | Mod: 26,RT,, | Performed by: RADIOLOGY

## 2020-09-14 PROCEDURE — 99999 PR PBB SHADOW E&M-EST. PATIENT-LVL III: CPT | Mod: PBBFAC,,, | Performed by: PHYSICIAN ASSISTANT

## 2020-09-14 PROCEDURE — 73562 X-RAY EXAM OF KNEE 3: CPT | Mod: 26,RT,, | Performed by: RADIOLOGY

## 2020-09-14 PROCEDURE — 99024 POSTOP FOLLOW-UP VISIT: CPT | Mod: S$GLB,,, | Performed by: PHYSICIAN ASSISTANT

## 2020-09-14 PROCEDURE — 73560 X-RAY EXAM OF KNEE 1 OR 2: CPT | Mod: TC,LT

## 2020-09-14 NOTE — PROGRESS NOTES
"Hilario Thomas presents for 6 week post-operative visit following a right FILOMENA total knee arthroplasty performed by Dr. Centeno on 8/25/2020. He is doing very well. He is in outpatient PT at [facility] but is close to discharge. Off of pain medication.     Exam:   Height 6' 2" (1.88 m), weight 99.6 kg (219 lb 11 oz).   Ambulating well without assistive device.  Incision is well healed. Suture reaction mid incision.   ROM:0-125    New radiographs reviewed today revealing a well fixed and aligned prosthesis.    A/P:  2 weeks s/p right total knee replacement  Suture reaction. Suture pulled out with sterile forceps and covered with antibiotic ointment and bandage.   - Patient may stop PT and start HEP when ready   - Reviewed antibiotic prophylaxis   - Pt will call if he develops redness or drainage from suture reaction site. Will start antibiotics at that time.   - Follow up in 6 weeks with Dr. Centeno. Pt will call clinic with problems/concerns.     "

## 2020-09-14 NOTE — PROGRESS NOTES
"  Physical Therapy Daily Treatment Note     Name: Hilario Thomas  Clinic Number: [id number]    Therapy Diagnosis:   Encounter Diagnosis   Name Primary?    Acute pain of right knee      Physician: Mariel Quintanilla PA-C    Visit Date: 9/14/2020  Physician Orders: PT Eval and Treat   Medical Diagnosis from Referral:     M17.12 (ICD-10-CM) - Primary osteoarthritis of left knee   Z96.652 (ICD-10-CM) - Status post total left knee replacement            Evaluation Date: 8/6/2020  Authorization Period Expiration: 08/06/2021  Plan of Care Expiration: 12/31/2020  Visit # / Visits authorized: 11/20       Time In: 0800  Time Out: 0900  Total Billable Time: 60 minutes    Precautions: Standard    Subjective     Pt reports: Patient was stung by a wasp on his knee.     He was compliant with home exercise program.  Response to previous treatment: Reduced pain and swelling  Functional change: Decreased gait deviations.    Pain: 0/10  Location: right knee      Objective   Knee Passive Range of Motion:    Right  Left    Flexion 120 120   Extension 5 hyper 0       Hilario received therapeutic exercises to develop strength, endurance, ROM and flexibility for 60 minutes including:    Upright bike full revolutions for repeated knee flexion 10'  TKE c towel 5" hold x 30  Supine SLR 3 x 10, short range to focus on terminal knee extension  SAQ with  4 x 10  LAQ with 10 lbs 4 x 8  Heel raises 3 x 10  Lateral walks 5 laps - BTB  Step ups 6" 30x   Eccentric step downs 3 x 10 ea, 4 in with stick for balance  Wall Squats with BTB for gluteal activation 3 x 8 ea with a 5" hold    NP:  Heel raises 3 x 10 BLE  Mini squat with BL hand hold x 15  Seated HS curl against manual resistance.    Hilario participated in dynamic functional therapeutic activities to improve functional performance for 0 minutes including:        Home Exercises Provided and Patient Education Provided     Education provided:   - Prognosis    Written Home Exercises Provided: " yes.  Exercises were reviewed and Hilario was able to demonstrate them prior to the end of the session.  Hilario demonstrated good  understanding of the education provided.     See EMR under Patient Instructions for exercises provided prior visit.    Assessment     Treatment continues to focus on end range quadriceps strengthening and gluteal control. Progressing well with current POC.    Hilario is progressing well towards his goals.   Pt prognosis is Excellent.     Pt will continue to benefit from skilled outpatient physical therapy to address the deficits listed in the problem list box on initial evaluation, provide pt/family education and to maximize pt's level of independence in the home and community environment.     Pt's spiritual, cultural and educational needs considered and pt agreeable to plan of care and goals.    Anticipated barriers to physical therapy: None    GOALS: Short Term Goals:  2 to 4 weeks  1.Report decreased knee pain  < / =  2/10  to increase tolerance for exercise  2. Increase knee ROM to 0 degrees knee extension in order to be able to perform ADLs without difficulty.  3. Increase strength by 1/3 MMT grade in gross RLE  to increase tolerance for ADL and work activities.  4. Pt to tolerate HEP to improve ROM and independence with ADL's     Long Term Goals: 12 to 24 weeks  1.Report decreased knee pain < / = 0/10  to increase tolerance for ADLs  2.Patient goal: Resume ADLs without knee pain  3.Increase strength to >/= 4+/5 in gross LLE  to increase tolerance for ADL and work activities.  4. Pt will report at CJ level (20-40% impaired) on FOTO knee to demonstrate increase in LE function with every day tasks.     Plan   Plan of care Certification: 8/6/2020 to 12/31/2020.    Assess and treat gait deviations if required.     Outpatient Physical Therapy 2 times weekly for 10 weeks to include the following interventions: manual therapy, therapeutic exercise, therapeutic activities, and  neuromuscular re-education.    Luigi Hope, PT, DPT

## 2020-09-18 ENCOUNTER — CLINICAL SUPPORT (OUTPATIENT)
Dept: REHABILITATION | Facility: HOSPITAL | Age: 65
End: 2020-09-18
Attending: FAMILY MEDICINE
Payer: COMMERCIAL

## 2020-09-18 DIAGNOSIS — M25.561 ACUTE PAIN OF RIGHT KNEE: ICD-10-CM

## 2020-09-18 PROCEDURE — 97110 THERAPEUTIC EXERCISES: CPT

## 2020-09-18 NOTE — PLAN OF CARE
"    Physical Therapy Daily Treatment Note     Name: Hilario Thomas  Clinic Number: [id number]    Therapy Diagnosis:   Encounter Diagnosis   Name Primary?    Acute pain of right knee      Physician: Mariel Quintanilla PA-C    Visit Date: 9/18/2020  Physician Orders: PT Eval and Treat   Medical Diagnosis from Referral:     M17.12 (ICD-10-CM) - Primary osteoarthritis of left knee   Z96.652 (ICD-10-CM) - Status post total left knee replacement            Evaluation Date: 8/6/2020  Authorization Period Expiration: 08/06/2021  Plan of Care Expiration: 12/31/2020  Visit # / Visits authorized: 11/20       Time In: 0810  Time Out: 0910  Total Billable Time: 60 minutes    Precautions: Standard    Subjective     Pt reports: he is able to complete all ADLs without pain and can continue strengthening independently.    He was compliant with home exercise program.  Response to previous treatment: Reduced pain and swelling  Functional change: Decreased gait deviations.    Pain: 0/10  Location: right knee      Objective   Knee Passive Range of Motion:    Right  Left    Flexion 125 128   Extension 5 hyper 0       Hilario received therapeutic exercises to develop strength, endurance, ROM and flexibility for 60 minutes including:    Upright bike full revolutions for repeated knee flexion 10'  TKE c towel 5" hold x 30  Seated SLR 4 x 8, short range to focus on terminal knee extension  SAQ with  4 x 10  LAQ with 10 lbs 4 x 8  Leg press 90 lbs SL, 4 x 8 ea  Lateral walks 5 laps - BTB  Step ups 6" 30x   Eccentric step downs 3 x 10 ea, 4 in with stick for balance  SL clams 4 x 10 ea, no band    NP:  Heel raises 3 x 10 BLE  Mini squat with BL hand hold x 15  Seated HS curl against manual resistance.  Wall Squats with BTB for gluteal activation 3 x 8 ea with a 5" hold    Hilario participated in dynamic functional therapeutic activities to improve functional performance for 0 minutes including:        Home Exercises Provided and Patient " Education Provided     Education provided:   - Prognosis    Written Home Exercises Provided: yes.  Exercises were reviewed and Hilario was able to demonstrate them prior to the end of the session.  Hilario demonstrated good  understanding of the education provided.     See EMR under Patient Instructions for exercises provided prior visit.    Assessment     Patient has progressed very well with treatment. ROM is WNL. Some strength deficits persist but the patient would like to continue strengthening independently. Patient will be discharged to continue his home exercise program.    Hilario is progressing well towards his goals.   Pt prognosis is Excellent.     Pt will continue to benefit from skilled outpatient physical therapy to address the deficits listed in the problem list box on initial evaluation, provide pt/family education and to maximize pt's level of independence in the home and community environment.     Pt's spiritual, cultural and educational needs considered and pt agreeable to plan of care and goals.    Anticipated barriers to physical therapy: None    GOALS: Short Term Goals:  2 to 4 weeks  1.Report decreased knee pain  < / =  2/10  to increase tolerance for exercise  2. Increase knee ROM to 0 degrees knee extension in order to be able to perform ADLs without difficulty.  3. Increase strength by 1/3 MMT grade in gross RLE  to increase tolerance for ADL and work activities.  4. Pt to tolerate HEP to improve ROM and independence with ADL's     Long Term Goals: 12 to 24 weeks  1.Report decreased knee pain < / = 0/10  to increase tolerance for ADLs  2.Patient goal: Resume ADLs without knee pain  3.Increase strength to >/= 4+/5 in gross LLE  to increase tolerance for ADL and work activities.  4. Pt will report at CJ level (20-40% impaired) on FOTO knee to demonstrate increase in LE function with every day tasks.     Plan   Plan of care Certification: 8/6/2020 to 12/31/2020.    Discharge patient from  Physical Therapy.    Luigi Hope, PT, DPT

## 2020-10-24 ENCOUNTER — IMMUNIZATION (OUTPATIENT)
Dept: PRIMARY CARE CLINIC | Facility: CLINIC | Age: 65
End: 2020-10-24
Payer: COMMERCIAL

## 2020-10-24 PROCEDURE — 90686 IIV4 VACC NO PRSV 0.5 ML IM: CPT | Mod: S$GLB,,, | Performed by: FAMILY MEDICINE

## 2020-10-24 PROCEDURE — 90471 FLU VACCINE (QUAD) GREATER THAN OR EQUAL TO 3YO PRESERVATIVE FREE IM: ICD-10-PCS | Mod: S$GLB,,, | Performed by: FAMILY MEDICINE

## 2020-10-24 PROCEDURE — 90686 FLU VACCINE (QUAD) GREATER THAN OR EQUAL TO 3YO PRESERVATIVE FREE IM: ICD-10-PCS | Mod: S$GLB,,, | Performed by: FAMILY MEDICINE

## 2020-10-24 PROCEDURE — 90471 IMMUNIZATION ADMIN: CPT | Mod: S$GLB,,, | Performed by: FAMILY MEDICINE

## 2020-10-26 ENCOUNTER — OFFICE VISIT (OUTPATIENT)
Dept: ORTHOPEDICS | Facility: CLINIC | Age: 65
End: 2020-10-26
Payer: COMMERCIAL

## 2020-10-26 VITALS — HEIGHT: 74 IN | BODY MASS INDEX: 29.23 KG/M2 | WEIGHT: 227.75 LBS

## 2020-10-26 DIAGNOSIS — Z96.651 STATUS POST TOTAL RIGHT KNEE REPLACEMENT: Primary | ICD-10-CM

## 2020-10-26 PROCEDURE — 99999 PR PBB SHADOW E&M-EST. PATIENT-LVL III: CPT | Mod: PBBFAC,,, | Performed by: ORTHOPAEDIC SURGERY

## 2020-10-26 PROCEDURE — 99999 PR PBB SHADOW E&M-EST. PATIENT-LVL III: ICD-10-PCS | Mod: PBBFAC,,, | Performed by: ORTHOPAEDIC SURGERY

## 2020-10-26 PROCEDURE — 99024 POSTOP FOLLOW-UP VISIT: CPT | Mod: S$GLB,,, | Performed by: ORTHOPAEDIC SURGERY

## 2020-10-26 PROCEDURE — 99024 PR POST-OP FOLLOW-UP VISIT: ICD-10-PCS | Mod: S$GLB,,, | Performed by: ORTHOPAEDIC SURGERY

## 2020-10-26 NOTE — PROGRESS NOTES
Mannie Nicolay is in for 3 month follow up for a  rightTKA.  He is doing  well.  No pain in the knee.  He has resumed activities of daily living. He has been quite active  Exam demonstrates  A well developed male in no distress.  Alert and oriented.  Mood and affect are appropriate.    Knee incision is well healed.  ROM is 0-130.  The patella tracks well and there is no instability. The extremity is neurovascularly intact.    Xrays demonstrate a well fixed and positioned prosthesis.        F/u in doing well.  F/U in 8 months with bilateral knee xrays and PROMS.  Sooner if needed.

## 2020-10-30 ENCOUNTER — PATIENT MESSAGE (OUTPATIENT)
Dept: ADMINISTRATIVE | Facility: HOSPITAL | Age: 65
End: 2020-10-30

## 2021-02-03 ENCOUNTER — TELEPHONE (OUTPATIENT)
Dept: ORTHOPEDICS | Facility: CLINIC | Age: 66
End: 2021-02-03

## 2021-02-03 DIAGNOSIS — Z96.653 STATUS POST BILATERAL KNEE REPLACEMENTS: Primary | ICD-10-CM

## 2021-02-03 RX ORDER — AMOXICILLIN 500 MG/1
2000 CAPSULE ORAL
Qty: 4 CAPSULE | Refills: 0 | Status: SHIPPED | OUTPATIENT
Start: 2021-02-03 | End: 2021-03-25

## 2021-02-23 ENCOUNTER — IMMUNIZATION (OUTPATIENT)
Dept: PRIMARY CARE CLINIC | Facility: CLINIC | Age: 66
End: 2021-02-23
Payer: COMMERCIAL

## 2021-02-23 DIAGNOSIS — Z23 NEED FOR VACCINATION: Primary | ICD-10-CM

## 2021-02-23 PROCEDURE — 91300 COVID-19, MRNA, LNP-S, PF, 30 MCG/0.3 ML DOSE VACCINE: CPT | Mod: PBBFAC | Performed by: EMERGENCY MEDICINE

## 2021-03-16 ENCOUNTER — IMMUNIZATION (OUTPATIENT)
Dept: PRIMARY CARE CLINIC | Facility: CLINIC | Age: 66
End: 2021-03-16
Payer: COMMERCIAL

## 2021-03-16 DIAGNOSIS — Z23 NEED FOR VACCINATION: Primary | ICD-10-CM

## 2021-03-16 PROCEDURE — 91300 COVID-19, MRNA, LNP-S, PF, 30 MCG/0.3 ML DOSE VACCINE: CPT | Mod: PBBFAC | Performed by: EMERGENCY MEDICINE

## 2021-03-16 PROCEDURE — 0002A COVID-19, MRNA, LNP-S, PF, 30 MCG/0.3 ML DOSE VACCINE: CPT | Mod: PBBFAC | Performed by: EMERGENCY MEDICINE

## 2021-03-25 ENCOUNTER — OFFICE VISIT (OUTPATIENT)
Dept: PRIMARY CARE CLINIC | Facility: CLINIC | Age: 66
End: 2021-03-25
Payer: COMMERCIAL

## 2021-03-25 ENCOUNTER — PATIENT MESSAGE (OUTPATIENT)
Dept: ORTHOPEDICS | Facility: CLINIC | Age: 66
End: 2021-03-25

## 2021-03-25 ENCOUNTER — TELEPHONE (OUTPATIENT)
Dept: PRIMARY CARE CLINIC | Facility: CLINIC | Age: 66
End: 2021-03-25

## 2021-03-25 VITALS
HEART RATE: 79 BPM | HEIGHT: 74 IN | WEIGHT: 231.5 LBS | SYSTOLIC BLOOD PRESSURE: 134 MMHG | TEMPERATURE: 98 F | DIASTOLIC BLOOD PRESSURE: 86 MMHG | BODY MASS INDEX: 29.71 KG/M2 | OXYGEN SATURATION: 95 % | RESPIRATION RATE: 16 BRPM

## 2021-03-25 DIAGNOSIS — L23.7 CONTACT DERMATITIS DUE TO POISON SUMAC: Primary | ICD-10-CM

## 2021-03-25 DIAGNOSIS — Z12.5 PROSTATE CANCER SCREENING: ICD-10-CM

## 2021-03-25 DIAGNOSIS — Z13.6 ENCOUNTER FOR SCREENING FOR CARDIOVASCULAR DISORDERS: Primary | ICD-10-CM

## 2021-03-25 PROCEDURE — 3008F PR BODY MASS INDEX (BMI) DOCUMENTED: ICD-10-PCS | Mod: CPTII,S$GLB,, | Performed by: STUDENT IN AN ORGANIZED HEALTH CARE EDUCATION/TRAINING PROGRAM

## 2021-03-25 PROCEDURE — 96372 THER/PROPH/DIAG INJ SC/IM: CPT | Mod: S$GLB,,, | Performed by: STUDENT IN AN ORGANIZED HEALTH CARE EDUCATION/TRAINING PROGRAM

## 2021-03-25 PROCEDURE — 1126F PR PAIN SEVERITY QUANTIFIED, NO PAIN PRESENT: ICD-10-PCS | Mod: S$GLB,,, | Performed by: STUDENT IN AN ORGANIZED HEALTH CARE EDUCATION/TRAINING PROGRAM

## 2021-03-25 PROCEDURE — 99214 PR OFFICE/OUTPT VISIT, EST, LEVL IV, 30-39 MIN: ICD-10-PCS | Mod: 25,S$GLB,, | Performed by: STUDENT IN AN ORGANIZED HEALTH CARE EDUCATION/TRAINING PROGRAM

## 2021-03-25 PROCEDURE — 3008F BODY MASS INDEX DOCD: CPT | Mod: CPTII,S$GLB,, | Performed by: STUDENT IN AN ORGANIZED HEALTH CARE EDUCATION/TRAINING PROGRAM

## 2021-03-25 PROCEDURE — 99999 PR PBB SHADOW E&M-EST. PATIENT-LVL IV: ICD-10-PCS | Mod: PBBFAC,,, | Performed by: STUDENT IN AN ORGANIZED HEALTH CARE EDUCATION/TRAINING PROGRAM

## 2021-03-25 PROCEDURE — 1126F AMNT PAIN NOTED NONE PRSNT: CPT | Mod: S$GLB,,, | Performed by: STUDENT IN AN ORGANIZED HEALTH CARE EDUCATION/TRAINING PROGRAM

## 2021-03-25 PROCEDURE — 99999 PR PBB SHADOW E&M-EST. PATIENT-LVL IV: CPT | Mod: PBBFAC,,, | Performed by: STUDENT IN AN ORGANIZED HEALTH CARE EDUCATION/TRAINING PROGRAM

## 2021-03-25 PROCEDURE — 96372 PR INJECTION,THERAP/PROPH/DIAG2ST, IM OR SUBCUT: ICD-10-PCS | Mod: S$GLB,,, | Performed by: STUDENT IN AN ORGANIZED HEALTH CARE EDUCATION/TRAINING PROGRAM

## 2021-03-25 PROCEDURE — 99214 OFFICE O/P EST MOD 30 MIN: CPT | Mod: 25,S$GLB,, | Performed by: STUDENT IN AN ORGANIZED HEALTH CARE EDUCATION/TRAINING PROGRAM

## 2021-03-25 RX ORDER — TRIAMCINOLONE ACETONIDE 40 MG/ML
80 INJECTION, SUSPENSION INTRA-ARTICULAR; INTRAMUSCULAR
Status: COMPLETED | OUTPATIENT
Start: 2021-03-25 | End: 2021-03-25

## 2021-03-25 RX ORDER — BETAMETHASONE SODIUM PHOSPHATE AND BETAMETHASONE ACETATE 3; 3 MG/ML; MG/ML
9 INJECTION, SUSPENSION INTRA-ARTICULAR; INTRALESIONAL; INTRAMUSCULAR; SOFT TISSUE ONCE
Status: COMPLETED | OUTPATIENT
Start: 2021-03-25 | End: 2021-03-25

## 2021-03-25 RX ADMIN — BETAMETHASONE SODIUM PHOSPHATE AND BETAMETHASONE ACETATE 9 MG: 3; 3 INJECTION, SUSPENSION INTRA-ARTICULAR; INTRALESIONAL; INTRAMUSCULAR; SOFT TISSUE at 10:03

## 2021-03-25 RX ADMIN — TRIAMCINOLONE ACETONIDE 80 MG: 40 INJECTION, SUSPENSION INTRA-ARTICULAR; INTRAMUSCULAR at 10:03

## 2021-04-01 ENCOUNTER — OFFICE VISIT (OUTPATIENT)
Dept: PRIMARY CARE CLINIC | Facility: CLINIC | Age: 66
End: 2021-04-01
Payer: COMMERCIAL

## 2021-04-01 VITALS
RESPIRATION RATE: 18 BRPM | BODY MASS INDEX: 28.86 KG/M2 | SYSTOLIC BLOOD PRESSURE: 130 MMHG | HEART RATE: 92 BPM | WEIGHT: 224.88 LBS | HEIGHT: 74 IN | OXYGEN SATURATION: 97 % | DIASTOLIC BLOOD PRESSURE: 66 MMHG

## 2021-04-01 DIAGNOSIS — Z12.11 COLON CANCER SCREENING: ICD-10-CM

## 2021-04-01 DIAGNOSIS — Z00.00 ANNUAL PHYSICAL EXAM: Primary | ICD-10-CM

## 2021-04-01 PROBLEM — M25.561 ACUTE PAIN OF RIGHT KNEE: Status: RESOLVED | Noted: 2020-08-06 | Resolved: 2021-04-01

## 2021-04-01 PROBLEM — R26.2 DIFFICULTY WALKING: Status: RESOLVED | Noted: 2020-05-07 | Resolved: 2021-04-01

## 2021-04-01 PROBLEM — R60.9 EDEMA: Status: RESOLVED | Noted: 2020-04-29 | Resolved: 2021-04-01

## 2021-04-01 PROBLEM — I49.9 ARRHYTHMIA: Status: RESOLVED | Noted: 2020-04-29 | Resolved: 2021-04-01

## 2021-04-01 PROCEDURE — 3008F BODY MASS INDEX DOCD: CPT | Mod: CPTII,S$GLB,, | Performed by: FAMILY MEDICINE

## 2021-04-01 PROCEDURE — 1126F PR PAIN SEVERITY QUANTIFIED, NO PAIN PRESENT: ICD-10-PCS | Mod: S$GLB,,, | Performed by: FAMILY MEDICINE

## 2021-04-01 PROCEDURE — 1126F AMNT PAIN NOTED NONE PRSNT: CPT | Mod: S$GLB,,, | Performed by: FAMILY MEDICINE

## 2021-04-01 PROCEDURE — 99396 PR PREVENTIVE VISIT,EST,40-64: ICD-10-PCS | Mod: S$GLB,,, | Performed by: FAMILY MEDICINE

## 2021-04-01 PROCEDURE — 99999 PR PBB SHADOW E&M-EST. PATIENT-LVL III: CPT | Mod: PBBFAC,,, | Performed by: FAMILY MEDICINE

## 2021-04-01 PROCEDURE — 99999 PR PBB SHADOW E&M-EST. PATIENT-LVL III: ICD-10-PCS | Mod: PBBFAC,,, | Performed by: FAMILY MEDICINE

## 2021-04-01 PROCEDURE — 3008F PR BODY MASS INDEX (BMI) DOCUMENTED: ICD-10-PCS | Mod: CPTII,S$GLB,, | Performed by: FAMILY MEDICINE

## 2021-04-01 PROCEDURE — 99396 PREV VISIT EST AGE 40-64: CPT | Mod: S$GLB,,, | Performed by: FAMILY MEDICINE

## 2021-04-05 ENCOUNTER — TELEPHONE (OUTPATIENT)
Dept: SURGERY | Facility: CLINIC | Age: 66
End: 2021-04-05

## 2021-05-03 ENCOUNTER — PATIENT MESSAGE (OUTPATIENT)
Dept: ORTHOPEDICS | Facility: CLINIC | Age: 66
End: 2021-05-03

## 2021-10-01 ENCOUNTER — IMMUNIZATION (OUTPATIENT)
Dept: PRIMARY CARE CLINIC | Facility: CLINIC | Age: 66
End: 2021-10-01
Payer: COMMERCIAL

## 2021-10-01 DIAGNOSIS — Z23 NEED FOR VACCINATION: Primary | ICD-10-CM

## 2021-10-01 PROCEDURE — 91300 COVID-19, MRNA, LNP-S, PF, 30 MCG/0.3 ML DOSE VACCINE: CPT | Mod: PBBFAC | Performed by: FAMILY MEDICINE

## 2022-04-01 ENCOUNTER — TELEPHONE (OUTPATIENT)
Dept: PRIMARY CARE CLINIC | Facility: CLINIC | Age: 67
End: 2022-04-01
Payer: COMMERCIAL

## 2022-04-01 NOTE — TELEPHONE ENCOUNTER
----- Message from Zakia Shore sent at 4/1/2022 11:23 AM CDT -----  Pt is asking for a call back to discuss blood work needed.    Please call to advise

## 2022-04-01 NOTE — TELEPHONE ENCOUNTER
Spoke with patient he just wanted to know if he could do his blood work Monday when he comes for his appointment. Told him yes

## 2022-04-04 ENCOUNTER — OFFICE VISIT (OUTPATIENT)
Dept: PRIMARY CARE CLINIC | Facility: CLINIC | Age: 67
End: 2022-04-04
Payer: MEDICARE

## 2022-04-04 VITALS
WEIGHT: 224.75 LBS | OXYGEN SATURATION: 97 % | RESPIRATION RATE: 18 BRPM | HEART RATE: 82 BPM | BODY MASS INDEX: 28.84 KG/M2 | SYSTOLIC BLOOD PRESSURE: 126 MMHG | HEIGHT: 74 IN | DIASTOLIC BLOOD PRESSURE: 80 MMHG

## 2022-04-04 DIAGNOSIS — M17.0 PRIMARY OSTEOARTHRITIS OF BOTH KNEES: ICD-10-CM

## 2022-04-04 DIAGNOSIS — Z23 NEED FOR VACCINATION: ICD-10-CM

## 2022-04-04 DIAGNOSIS — N52.9 ERECTILE DYSFUNCTION, UNSPECIFIED ERECTILE DYSFUNCTION TYPE: ICD-10-CM

## 2022-04-04 DIAGNOSIS — Z12.11 COLON CANCER SCREENING: ICD-10-CM

## 2022-04-04 DIAGNOSIS — Z13.6 ENCOUNTER FOR SCREENING FOR CARDIOVASCULAR DISORDERS: ICD-10-CM

## 2022-04-04 DIAGNOSIS — D48.5 NEOPLASM OF UNCERTAIN BEHAVIOR OF SKIN: ICD-10-CM

## 2022-04-04 DIAGNOSIS — Z13.6 ENCOUNTER FOR ABDOMINAL AORTIC ANEURYSM (AAA) SCREENING: ICD-10-CM

## 2022-04-04 DIAGNOSIS — Z12.5 PROSTATE CANCER SCREENING: ICD-10-CM

## 2022-04-04 DIAGNOSIS — Z00.00 ANNUAL PHYSICAL EXAM: Primary | ICD-10-CM

## 2022-04-04 PROCEDURE — 3074F PR MOST RECENT SYSTOLIC BLOOD PRESSURE < 130 MM HG: ICD-10-PCS | Mod: CPTII,S$GLB,, | Performed by: FAMILY MEDICINE

## 2022-04-04 PROCEDURE — 3008F BODY MASS INDEX DOCD: CPT | Mod: CPTII,S$GLB,, | Performed by: FAMILY MEDICINE

## 2022-04-04 PROCEDURE — G0009 PNEUMOCOCCAL POLYSACCHARIDE VACCINE 23-VALENT =>2YO SQ IM: ICD-10-PCS | Mod: S$GLB,,, | Performed by: FAMILY MEDICINE

## 2022-04-04 PROCEDURE — 99999 PR PBB SHADOW E&M-EST. PATIENT-LVL IV: ICD-10-PCS | Mod: PBBFAC,,, | Performed by: FAMILY MEDICINE

## 2022-04-04 PROCEDURE — 90732 PPSV23 VACC 2 YRS+ SUBQ/IM: CPT | Mod: S$GLB,,, | Performed by: FAMILY MEDICINE

## 2022-04-04 PROCEDURE — 1159F PR MEDICATION LIST DOCUMENTED IN MEDICAL RECORD: ICD-10-PCS | Mod: CPTII,S$GLB,, | Performed by: FAMILY MEDICINE

## 2022-04-04 PROCEDURE — 1160F PR REVIEW ALL MEDS BY PRESCRIBER/CLIN PHARMACIST DOCUMENTED: ICD-10-PCS | Mod: CPTII,S$GLB,, | Performed by: FAMILY MEDICINE

## 2022-04-04 PROCEDURE — 99999 PR PBB SHADOW E&M-EST. PATIENT-LVL IV: CPT | Mod: PBBFAC,,, | Performed by: FAMILY MEDICINE

## 2022-04-04 PROCEDURE — 3288F FALL RISK ASSESSMENT DOCD: CPT | Mod: CPTII,S$GLB,, | Performed by: FAMILY MEDICINE

## 2022-04-04 PROCEDURE — 1159F MED LIST DOCD IN RCRD: CPT | Mod: CPTII,S$GLB,, | Performed by: FAMILY MEDICINE

## 2022-04-04 PROCEDURE — 1101F PT FALLS ASSESS-DOCD LE1/YR: CPT | Mod: CPTII,S$GLB,, | Performed by: FAMILY MEDICINE

## 2022-04-04 PROCEDURE — 3074F SYST BP LT 130 MM HG: CPT | Mod: CPTII,S$GLB,, | Performed by: FAMILY MEDICINE

## 2022-04-04 PROCEDURE — 1160F RVW MEDS BY RX/DR IN RCRD: CPT | Mod: CPTII,S$GLB,, | Performed by: FAMILY MEDICINE

## 2022-04-04 PROCEDURE — 3079F PR MOST RECENT DIASTOLIC BLOOD PRESSURE 80-89 MM HG: ICD-10-PCS | Mod: CPTII,S$GLB,, | Performed by: FAMILY MEDICINE

## 2022-04-04 PROCEDURE — 99397 PER PM REEVAL EST PAT 65+ YR: CPT | Mod: S$GLB,,, | Performed by: FAMILY MEDICINE

## 2022-04-04 PROCEDURE — 1101F PR PT FALLS ASSESS DOC 0-1 FALLS W/OUT INJ PAST YR: ICD-10-PCS | Mod: CPTII,S$GLB,, | Performed by: FAMILY MEDICINE

## 2022-04-04 PROCEDURE — 99397 PR PREVENTIVE VISIT,EST,65 & OVER: ICD-10-PCS | Mod: S$GLB,,, | Performed by: FAMILY MEDICINE

## 2022-04-04 PROCEDURE — 3008F PR BODY MASS INDEX (BMI) DOCUMENTED: ICD-10-PCS | Mod: CPTII,S$GLB,, | Performed by: FAMILY MEDICINE

## 2022-04-04 PROCEDURE — G0009 ADMIN PNEUMOCOCCAL VACCINE: HCPCS | Mod: S$GLB,,, | Performed by: FAMILY MEDICINE

## 2022-04-04 PROCEDURE — 90732 PNEUMOCOCCAL POLYSACCHARIDE VACCINE 23-VALENT =>2YO SQ IM: ICD-10-PCS | Mod: S$GLB,,, | Performed by: FAMILY MEDICINE

## 2022-04-04 PROCEDURE — 3288F PR FALLS RISK ASSESSMENT DOCUMENTED: ICD-10-PCS | Mod: CPTII,S$GLB,, | Performed by: FAMILY MEDICINE

## 2022-04-04 PROCEDURE — 3079F DIAST BP 80-89 MM HG: CPT | Mod: CPTII,S$GLB,, | Performed by: FAMILY MEDICINE

## 2022-04-04 NOTE — PROGRESS NOTES
"Subjective:       Patient ID: Hilario Thomas is a 67 y.o. male.    Chief Complaint: Annual Exam (fasting)    Here for annual checkup. 24h GI bug over weekend, feeling better now. Wants referral to Derm for a few spots, mother had skin CA    Review of Systems   Constitutional: Negative for chills, fatigue and fever.   HENT: Negative for congestion.    Eyes: Negative for visual disturbance.   Respiratory: Negative for cough and shortness of breath.    Cardiovascular: Negative for chest pain.   Gastrointestinal: Negative for abdominal pain, nausea and vomiting.   Genitourinary: Negative for difficulty urinating.   Musculoskeletal: Negative for arthralgias.   Skin: Negative for rash.   Neurological: Negative for dizziness.   Psychiatric/Behavioral: Negative for sleep disturbance.       Objective:      Vitals:    04/04/22 0842   BP: 126/80   BP Location: Right arm   Patient Position: Sitting   BP Method: Medium (Manual)   Pulse: 82   Resp: 18   SpO2: 97%   Weight: 101.9 kg (224 lb 12.1 oz)   Height: 6' 2" (1.88 m)     Physical Exam  Vitals and nursing note reviewed.   Constitutional:       Appearance: He is well-developed.   HENT:      Head: Normocephalic and atraumatic.   Eyes:      Pupils: Pupils are equal, round, and reactive to light.   Neck:      Vascular: No carotid bruit or JVD.   Cardiovascular:      Rate and Rhythm: Normal rate and regular rhythm.      Pulses:           Radial pulses are 2+ on the right side and 2+ on the left side.      Heart sounds: Normal heart sounds.   Pulmonary:      Effort: Pulmonary effort is normal.      Breath sounds: Normal breath sounds.   Abdominal:      General: Bowel sounds are normal.      Palpations: Abdomen is soft.      Tenderness: There is no abdominal tenderness.   Musculoskeletal:      Cervical back: Neck supple.   Skin:     General: Skin is warm and dry.   Neurological:      Mental Status: He is alert and oriented to person, place, and time.   Psychiatric:         " Behavior: Behavior normal.         Lab Results   Component Value Date    WBC 6.43 07/27/2020    HGB 15.4 07/27/2020    HCT 49.0 07/27/2020     07/27/2020    CHOL 152 04/01/2021    TRIG 87 04/01/2021    HDL 48 04/01/2021    ALT 22 04/01/2021    AST 18 04/01/2021     04/01/2021    K 4.1 04/01/2021     04/01/2021    CREATININE 0.9 04/01/2021    BUN 23 04/01/2021    CO2 27 04/01/2021    PSA 0.68 04/01/2021    INR 0.9 07/27/2020      Assessment:       1. Annual physical exam    2. Colon cancer screening    3. Encounter for abdominal aortic aneurysm (AAA) screening    4. Prostate cancer screening    5. Encounter for screening for cardiovascular disorders    6. Need for vaccination    7. Primary osteoarthritis of both knees    8. Neoplasm of uncertain behavior of skin    9. Erectile dysfunction, unspecified erectile dysfunction type        Plan:       Annual physical exam  -     Comprehensive Metabolic Panel; Future; Expected date: 04/04/2022  -     Lipid Panel; Future; Expected date: 04/04/2022  -     PSA, Screening; Future; Expected date: 04/04/2022  -     CBC Auto Differential; Future; Expected date: 04/04/2022    Colon cancer screening  -     Case Request Endoscopy: COLONOSCOPY    Encounter for abdominal aortic aneurysm (AAA) screening  -     US Abdominal Aorta; Future; Expected date: 04/04/2022    Prostate cancer screening  -     PSA, Screening; Future; Expected date: 04/04/2022    Encounter for screening for cardiovascular disorders  -     Comprehensive Metabolic Panel; Future; Expected date: 04/04/2022  -     Lipid Panel; Future; Expected date: 04/04/2022    Need for vaccination  -     Pneumococcal Polysaccharide Vaccine (23 Valent) (SQ/IM)    Primary osteoarthritis of both knees    Neoplasm of uncertain behavior of skin  -     Ambulatory referral/consult to Dermatology; Future; Expected date: 04/11/2022    Erectile dysfunction, unspecified erectile dysfunction type  -     CBC Auto Differential;  Future; Expected date: 04/04/2022      Medication List with Changes/Refills   Current Medications    ACETAMINOPHEN (TYLENOL) 650 MG TBSR    Take 1 tablet (650 mg total) by mouth every 8 (eight) hours as needed.    ASCORBIC ACID (VITAMIN C ORAL)    Take by mouth. Hold for 1 week prior to surgery    ERGOCALCIFEROL, VITAMIN D2, (VITAMIN D ORAL)    Take by mouth. Hold for 1 week prior to surgery

## 2022-04-07 RX ORDER — SODIUM, POTASSIUM,MAG SULFATES 17.5-3.13G
1 SOLUTION, RECONSTITUTED, ORAL ORAL DAILY
Qty: 1 KIT | Refills: 0 | Status: SHIPPED | OUTPATIENT
Start: 2022-04-07 | End: 2022-04-09

## 2022-04-07 NOTE — TELEPHONE ENCOUNTER
Spoke to patient regarding scheduling colonoscopy at [facility]. Patient verbally consented and requested to be scheduled for Wednesday, May 25, 2022 .  Patient was advised a designated  is required on the day of the Colonoscopy to drive the patient home and the  must be at least 18 years old. Colonoscopy Prep instructions were thoroughly explained and discussed with the patient.   It was emphasized, and reiterated not to follow the prep instructions that comes with the Prep Kit from the pharmacy. However, to please follow the prep instructions that will be received in the mail or through the patient portal.  Patient's medications on file was reviewed with the patient for accuracy of information. Patient was further explained the Pre-Op will call one day prior to the procedure date, to discuss Pre-Op instructions;and what time to report for the Colonoscopy. The patient was given the opportunity to ask any questions about the Colonoscopy. No further issues were discussed.    Bowel Prep/SUPREP instructions                                                              [facility]    [address] [place] [address]          You are scheduled for a Colonoscopy with Dr. River on Wednesday, May 25, 2022 at [facility] in [place].        Check in at the hospital on 1st floor Registration area next to Emergency room.    Please call [phone] to reschedule or if you have any questions.      An adult friend/family member must come with you to drive you home.  You cannot drive, take a taxi, Uber/Lyft or bus to leave the Hospital alone. If you do not have someone with you to drive you home, your test will be cancelled.       Please follow the directions of your doctor if you take any pills that thin your blood.  If you take these meds: Aggrenox, Brilinta, Effient, Eliquis, Lovenox, Plavix, Pletal Pradaxa ticilid, Xarelto, or Coumadin, let the doctor's office  know.      Don't: On the morning of the test do not take insulin or pills for diabetes.     Do: On the morning of the test, do take any pills for blood pressure, heart, anti-rejection and or seizures with a small sip of water. Bring any inhalers with you day of procedure.      To have a good prep, you must follow these instructions- please do not use the directions from the pharmacy!      The doctor will send a prescription for the SUPREP     The day Before the test:   You can only drink CLEAR LIQUIDS the whole day before your test. You can't eat any food for the whole day.      You CAN have:   Water,Coffee or decaf coffee (no milk or cream)    Tea   Soft drinks- regular and sugar free   Jell-O (green or yellow)   Apple Juice, grape juice, white cranberry juice   Gatorade, Power Aid, Crystal Light, Ashish Aid   Lemonade and Limeade   Bouillon, clear soup   Snowball, popsicles   YOU CAN'T DRINK ANYTHING RED   YOU CAN'T DRINK ALCOHOL   ONLY DRINK WHAT IS ON THIS List      At 5pm the night before your test:   Pour the 1st bottle of SUPREP into the cup provided in the box.  Add water to the line on the cup and mix well. Drink the whole cup and then drink 2 more full cups of water over the 1 hour.    This can be easier to drink if it is cold.  You can mix it 20 minutes ahead of time and place in the refrigerator before you drink it.  You must drink it within 30-45 minutes of mixing it. Do NOT pour the drink over ice. You can drink it with a straw.     The Day of the test- We will call you 2 days before your test to tell you what time to get there.      5 hours before you come to the hospital (this may be the middle of the night)     Pour the 2nd bottle of SUPREP into to the cup provided in the box. Add water to the line on the cup and mix well. Drink the whole cup and then drink 2 more full cups of water over 1 hour.    It might be easier to drink if it is cold. You can mix it 20 minutes ahead of time and place in the  refrigerator before you drink it. You must drink it within 30-45 minutes of mixing it. Do NOT pour the drink over ice. You can drink it with a straw.     YOU CAN'T EAT OR DRINK ANYTHING ELSE ONCE YOU FINISH THE PREP.     Leave all valuables and jewelry at home. You will be at the hospital for 2-4 hours.    Call the Endoscopy Scheduling Department at [phone] with any questions about your procedure.    Please  your medication from your local pharmacy. If you are unable to  the SUPREP Kit please contact our office.     Thank you,  Endo Scheduling Dept   [facility]

## 2022-04-18 PROBLEM — I70.0 ATHEROSCLEROSIS OF ABDOMINAL AORTA: Status: ACTIVE | Noted: 2022-04-18

## 2022-05-20 ENCOUNTER — IMMUNIZATION (OUTPATIENT)
Dept: PRIMARY CARE CLINIC | Facility: CLINIC | Age: 67
End: 2022-05-20
Payer: MEDICARE

## 2022-05-20 DIAGNOSIS — Z23 NEED FOR VACCINATION: Primary | ICD-10-CM

## 2022-05-20 PROCEDURE — 91300 COVID-19, MRNA, LNP-S, PF, 30 MCG/0.3 ML DOSE VACCINE: CPT | Mod: PBBFAC | Performed by: EMERGENCY MEDICINE

## 2022-09-30 ENCOUNTER — IMMUNIZATION (OUTPATIENT)
Dept: PRIMARY CARE CLINIC | Facility: CLINIC | Age: 67
End: 2022-09-30
Payer: MEDICARE

## 2022-09-30 DIAGNOSIS — Z23 NEED FOR VACCINATION: Primary | ICD-10-CM

## 2022-09-30 PROCEDURE — 0054A COVID-19, MRNA, LNP-S, PF, 30 MCG/0.3 ML DOSE VACCINE (PFIZER): CPT | Mod: PBBFAC | Performed by: EMERGENCY MEDICINE

## 2022-09-30 PROCEDURE — 91305 COVID-19, MRNA, LNP-S, PF, 30 MCG/0.3 ML DOSE VACCINE (PFIZER): ICD-10-PCS | Mod: S$GLB,,, | Performed by: EMERGENCY MEDICINE

## 2022-09-30 PROCEDURE — 91305 COVID-19, MRNA, LNP-S, PF, 30 MCG/0.3 ML DOSE VACCINE (PFIZER): CPT | Mod: S$GLB,,, | Performed by: EMERGENCY MEDICINE

## 2022-09-30 PROCEDURE — 0054A PR IMMUNIZ ADMIN, SARS-COV-2 COVID-19 VACC, TRI SUCROSE, 30MCG/0.3ML, BOOSTER DOSE: CPT | Mod: S$GLB,,, | Performed by: EMERGENCY MEDICINE

## 2022-09-30 PROCEDURE — 0054A PR IMMUNIZ ADMIN, SARS-COV-2 COVID-19 VACC, TRI SUCROSE, 30MCG/0.3ML, BOOSTER DOSE: ICD-10-PCS | Mod: S$GLB,,, | Performed by: EMERGENCY MEDICINE

## 2023-02-07 DIAGNOSIS — Z00.00 ENCOUNTER FOR MEDICARE ANNUAL WELLNESS EXAM: ICD-10-CM

## 2023-02-09 DIAGNOSIS — Z00.00 ENCOUNTER FOR MEDICARE ANNUAL WELLNESS EXAM: ICD-10-CM

## 2023-09-20 ENCOUNTER — OFFICE VISIT (OUTPATIENT)
Dept: PRIMARY CARE CLINIC | Facility: CLINIC | Age: 68
End: 2023-09-20
Payer: MEDICARE

## 2023-09-20 VITALS
BODY MASS INDEX: 29.23 KG/M2 | TEMPERATURE: 98 F | DIASTOLIC BLOOD PRESSURE: 80 MMHG | RESPIRATION RATE: 18 BRPM | SYSTOLIC BLOOD PRESSURE: 130 MMHG | HEART RATE: 79 BPM | WEIGHT: 227.75 LBS | OXYGEN SATURATION: 97 % | HEIGHT: 74 IN

## 2023-09-20 DIAGNOSIS — Z23 NEED FOR VACCINATION: ICD-10-CM

## 2023-09-20 DIAGNOSIS — Z83.3 FAMILY HISTORY OF DIABETES MELLITUS: ICD-10-CM

## 2023-09-20 DIAGNOSIS — Z79.899 ENCOUNTER FOR LONG-TERM CURRENT USE OF MEDICATION: ICD-10-CM

## 2023-09-20 DIAGNOSIS — R14.2 ERUCTATION: Primary | ICD-10-CM

## 2023-09-20 DIAGNOSIS — Z12.5 PROSTATE CANCER SCREENING: ICD-10-CM

## 2023-09-20 DIAGNOSIS — E78.5 HYPERLIPIDEMIA, UNSPECIFIED HYPERLIPIDEMIA TYPE: ICD-10-CM

## 2023-09-20 DIAGNOSIS — I70.0 ATHEROSCLEROSIS OF ABDOMINAL AORTA: ICD-10-CM

## 2023-09-20 PROCEDURE — 99214 OFFICE O/P EST MOD 30 MIN: CPT | Mod: 25,HCNC,S$GLB, | Performed by: FAMILY MEDICINE

## 2023-09-20 PROCEDURE — 99999 PR PBB SHADOW E&M-EST. PATIENT-LVL III: CPT | Mod: PBBFAC,HCNC,, | Performed by: FAMILY MEDICINE

## 2023-09-20 PROCEDURE — 90677 PCV20 VACCINE IM: CPT | Mod: HCNC,S$GLB,, | Performed by: FAMILY MEDICINE

## 2023-09-20 PROCEDURE — 99999 PR PBB SHADOW E&M-EST. PATIENT-LVL III: ICD-10-PCS | Mod: PBBFAC,HCNC,, | Performed by: FAMILY MEDICINE

## 2023-09-20 PROCEDURE — 1159F PR MEDICATION LIST DOCUMENTED IN MEDICAL RECORD: ICD-10-PCS | Mod: HCNC,CPTII,S$GLB, | Performed by: FAMILY MEDICINE

## 2023-09-20 PROCEDURE — 1126F AMNT PAIN NOTED NONE PRSNT: CPT | Mod: HCNC,CPTII,S$GLB, | Performed by: FAMILY MEDICINE

## 2023-09-20 PROCEDURE — G0009 ADMIN PNEUMOCOCCAL VACCINE: HCPCS | Mod: HCNC,S$GLB,, | Performed by: FAMILY MEDICINE

## 2023-09-20 PROCEDURE — G0009 PNEUMOCOCCAL CONJUGATE VACCINE 20-VALENT: ICD-10-PCS | Mod: HCNC,S$GLB,, | Performed by: FAMILY MEDICINE

## 2023-09-20 PROCEDURE — 1101F PR PT FALLS ASSESS DOC 0-1 FALLS W/OUT INJ PAST YR: ICD-10-PCS | Mod: HCNC,CPTII,S$GLB, | Performed by: FAMILY MEDICINE

## 2023-09-20 PROCEDURE — 3288F PR FALLS RISK ASSESSMENT DOCUMENTED: ICD-10-PCS | Mod: HCNC,CPTII,S$GLB, | Performed by: FAMILY MEDICINE

## 2023-09-20 PROCEDURE — 3288F FALL RISK ASSESSMENT DOCD: CPT | Mod: HCNC,CPTII,S$GLB, | Performed by: FAMILY MEDICINE

## 2023-09-20 PROCEDURE — 1101F PT FALLS ASSESS-DOCD LE1/YR: CPT | Mod: HCNC,CPTII,S$GLB, | Performed by: FAMILY MEDICINE

## 2023-09-20 PROCEDURE — 1160F PR REVIEW ALL MEDS BY PRESCRIBER/CLIN PHARMACIST DOCUMENTED: ICD-10-PCS | Mod: HCNC,CPTII,S$GLB, | Performed by: FAMILY MEDICINE

## 2023-09-20 PROCEDURE — 1126F PR PAIN SEVERITY QUANTIFIED, NO PAIN PRESENT: ICD-10-PCS | Mod: HCNC,CPTII,S$GLB, | Performed by: FAMILY MEDICINE

## 2023-09-20 PROCEDURE — 3075F SYST BP GE 130 - 139MM HG: CPT | Mod: HCNC,CPTII,S$GLB, | Performed by: FAMILY MEDICINE

## 2023-09-20 PROCEDURE — 1159F MED LIST DOCD IN RCRD: CPT | Mod: HCNC,CPTII,S$GLB, | Performed by: FAMILY MEDICINE

## 2023-09-20 PROCEDURE — 3075F PR MOST RECENT SYSTOLIC BLOOD PRESS GE 130-139MM HG: ICD-10-PCS | Mod: HCNC,CPTII,S$GLB, | Performed by: FAMILY MEDICINE

## 2023-09-20 PROCEDURE — 3079F DIAST BP 80-89 MM HG: CPT | Mod: HCNC,CPTII,S$GLB, | Performed by: FAMILY MEDICINE

## 2023-09-20 PROCEDURE — 3079F PR MOST RECENT DIASTOLIC BLOOD PRESSURE 80-89 MM HG: ICD-10-PCS | Mod: HCNC,CPTII,S$GLB, | Performed by: FAMILY MEDICINE

## 2023-09-20 PROCEDURE — 1160F RVW MEDS BY RX/DR IN RCRD: CPT | Mod: HCNC,CPTII,S$GLB, | Performed by: FAMILY MEDICINE

## 2023-09-20 PROCEDURE — 3008F BODY MASS INDEX DOCD: CPT | Mod: HCNC,CPTII,S$GLB, | Performed by: FAMILY MEDICINE

## 2023-09-20 PROCEDURE — 90677 PNEUMOCOCCAL CONJUGATE VACCINE 20-VALENT: ICD-10-PCS | Mod: HCNC,S$GLB,, | Performed by: FAMILY MEDICINE

## 2023-09-20 PROCEDURE — 3008F PR BODY MASS INDEX (BMI) DOCUMENTED: ICD-10-PCS | Mod: HCNC,CPTII,S$GLB, | Performed by: FAMILY MEDICINE

## 2023-09-20 PROCEDURE — 99214 PR OFFICE/OUTPT VISIT, EST, LEVL IV, 30-39 MIN: ICD-10-PCS | Mod: 25,HCNC,S$GLB, | Performed by: FAMILY MEDICINE

## 2023-09-20 RX ORDER — FAMOTIDINE 40 MG/1
40 TABLET, FILM COATED ORAL NIGHTLY
Qty: 14 TABLET | Refills: 0 | Status: SHIPPED | OUTPATIENT
Start: 2023-09-20 | End: 2023-10-04

## 2023-09-20 NOTE — PROGRESS NOTES
Verified pt by name and . NKDA. Per physician orders pt was administered Prevnar 20 IM to right deltoid using aseptic technique. Pt tolerated well. No adverse effects or pain reported. MD notified.

## 2023-09-20 NOTE — PROGRESS NOTES
"Subjective:       Patient ID: Hilario Thomas is a 68 y.o. male.    Chief Complaint: Annual Exam (Physical and blood work)    Here for annual checkup. Past few weeks has been waking up with pain in left side, belches for 20-30 minutes, and symptoms resolve. No dyspepsia, trouble swallowing, or change in bowels. Tried Gaviscon w/o improvement.      Review of Systems   Constitutional:  Negative for chills, fatigue and fever.   HENT:  Negative for congestion.    Eyes:  Negative for visual disturbance.   Respiratory:  Negative for cough and shortness of breath.    Cardiovascular:  Negative for chest pain.   Gastrointestinal:  Negative for blood in stool, nausea and vomiting.   Genitourinary:  Negative for difficulty urinating.   Musculoskeletal:  Negative for arthralgias.   Skin:  Negative for rash.   Allergic/Immunologic: Negative for immunocompromised state.   Neurological:  Negative for dizziness.   Hematological:  Does not bruise/bleed easily.   Psychiatric/Behavioral:  Negative for sleep disturbance.        Objective:      Vitals:    09/20/23 0954   BP: 130/80   BP Location: Right arm   Patient Position: Sitting   BP Method: Large (Manual)   Pulse: 79   Resp: 18   Temp: 97.8 °F (36.6 °C)   TempSrc: Temporal   SpO2: 97%   Weight: 103.3 kg (227 lb 11.8 oz)   Height: 6' 2" (1.88 m)     BP Readings from Last 5 Encounters:   09/20/23 130/80   05/25/22 125/71   04/04/22 126/80   04/01/21 130/66   03/25/21 134/86     Wt Readings from Last 5 Encounters:   09/20/23 103.3 kg (227 lb 11.8 oz)   05/25/22 100.1 kg (220 lb 10.9 oz)   04/04/22 101.9 kg (224 lb 12.1 oz)   04/01/21 102 kg (224 lb 13.9 oz)   03/25/21 105 kg (231 lb 7.8 oz)     Physical Exam  Vitals and nursing note reviewed.   Constitutional:       General: He is not in acute distress.     Appearance: Normal appearance. He is well-developed.   HENT:      Head: Normocephalic and atraumatic.   Neck:      Vascular: No carotid bruit.   Cardiovascular:      Rate and " Rhythm: Normal rate and regular rhythm.      Heart sounds: Normal heart sounds.   Pulmonary:      Effort: Pulmonary effort is normal.      Breath sounds: Normal breath sounds.   Abdominal:      General: There is no distension.      Tenderness: There is no abdominal tenderness. There is no right CVA tenderness or left CVA tenderness.   Musculoskeletal:      Right lower leg: No edema.      Left lower leg: No edema.   Skin:     General: Skin is warm and dry.   Neurological:      Mental Status: He is alert and oriented to person, place, and time.   Psychiatric:         Mood and Affect: Mood normal.         Behavior: Behavior normal.         Lab Results   Component Value Date    WBC 7.05 04/04/2022    HGB 15.3 04/04/2022    HCT 46.9 04/04/2022     04/04/2022    CHOL 124 04/04/2022    TRIG 62 04/04/2022    HDL 34 (L) 04/04/2022    ALT 18 04/04/2022    AST 19 04/04/2022     04/04/2022    K 4.3 04/04/2022     04/04/2022    CREATININE 0.9 04/04/2022    BUN 12 04/04/2022    CO2 21 (L) 04/04/2022    PSA 1.0 04/04/2022    INR 0.9 07/27/2020      Assessment:       1. Eructation    2. Atherosclerosis of abdominal aorta    3. Prostate cancer screening    4. Need for vaccination    5. Family history of diabetes mellitus    6. Hyperlipidemia, unspecified hyperlipidemia type    7. Encounter for long-term current use of medication        Plan:       Eructation  -     famotidine (PEPCID) 40 MG tablet; Take 1 tablet (40 mg total) by mouth every evening. for 14 days  Dispense: 14 tablet; Refill: 0  Trial of H2 blocker.  Further workup if symptoms persist  Atherosclerosis of abdominal aorta  -     CBC Auto Differential; Future; Expected date: 09/20/2023    Prostate cancer screening  -     PSA, Screening; Future; Expected date: 09/20/2023    Need for vaccination  -     Pneumococcal Conjugate Vaccine (20 Valent) (IM)    Family history of diabetes mellitus  -     Hemoglobin A1C; Future; Expected date:  09/20/2023    Hyperlipidemia, unspecified hyperlipidemia type  -     CBC Auto Differential; Future; Expected date: 09/20/2023  -     Comprehensive Metabolic Panel; Future; Expected date: 09/20/2023  -     Lipid Panel; Future; Expected date: 09/20/2023    Encounter for long-term current use of medication  -     Hemoglobin A1C; Future; Expected date: 09/20/2023      Medication List with Changes/Refills   New Medications    FAMOTIDINE (PEPCID) 40 MG TABLET    Take 1 tablet (40 mg total) by mouth every evening. for 14 days   Current Medications    ACETAMINOPHEN (TYLENOL) 650 MG TBSR    Take 1 tablet (650 mg total) by mouth every 8 (eight) hours as needed.    ASCORBIC ACID (VITAMIN C ORAL)    Take by mouth. Hold for 1 week prior to surgery

## 2023-09-26 ENCOUNTER — PATIENT MESSAGE (OUTPATIENT)
Dept: PRIMARY CARE CLINIC | Facility: CLINIC | Age: 68
End: 2023-09-26
Payer: MEDICARE

## 2023-09-26 ENCOUNTER — TELEPHONE (OUTPATIENT)
Dept: GASTROENTEROLOGY | Facility: CLINIC | Age: 68
End: 2023-09-26
Payer: MEDICARE

## 2023-09-26 DIAGNOSIS — R14.2 ERUCTATION: Primary | ICD-10-CM

## 2023-09-26 NOTE — TELEPHONE ENCOUNTER
----- Message from Lisa Apodaca sent at 9/26/2023 12:50 PM CDT -----  Needs advice from nurse:      Who Called:pt  Regarding:patient needs to be seen for abdominal pain/gassy  Would the patient rather a call back or VIA [facility]?  Best Call Back number:[phone]  Additional Info:

## 2023-09-26 NOTE — TELEPHONE ENCOUNTER
Patient states that he is have abdominal pain and burping. I informed him that  doesn't have anything available until November. Scheduled the patient to see one of the NP in clinic on 10/03. Verbal understanding

## 2023-09-26 NOTE — TELEPHONE ENCOUNTER
Called pt regarding message. Informed pt referral was signed. Pt stated he already contact  office for a appt.

## 2023-10-03 ENCOUNTER — OFFICE VISIT (OUTPATIENT)
Dept: GASTROENTEROLOGY | Facility: CLINIC | Age: 68
End: 2023-10-03
Payer: MEDICARE

## 2023-10-03 ENCOUNTER — PATIENT MESSAGE (OUTPATIENT)
Dept: GASTROENTEROLOGY | Facility: CLINIC | Age: 68
End: 2023-10-03

## 2023-10-03 VITALS — HEIGHT: 74 IN | BODY MASS INDEX: 29.34 KG/M2 | WEIGHT: 228.63 LBS

## 2023-10-03 DIAGNOSIS — R10.13 DYSPEPSIA: Primary | ICD-10-CM

## 2023-10-03 DIAGNOSIS — R14.2 ERUCTATION: ICD-10-CM

## 2023-10-03 PROCEDURE — 3288F FALL RISK ASSESSMENT DOCD: CPT | Mod: HCNC,CPTII,S$GLB, | Performed by: NURSE PRACTITIONER

## 2023-10-03 PROCEDURE — 3044F PR MOST RECENT HEMOGLOBIN A1C LEVEL <7.0%: ICD-10-PCS | Mod: HCNC,CPTII,S$GLB, | Performed by: NURSE PRACTITIONER

## 2023-10-03 PROCEDURE — 1126F AMNT PAIN NOTED NONE PRSNT: CPT | Mod: HCNC,CPTII,S$GLB, | Performed by: NURSE PRACTITIONER

## 2023-10-03 PROCEDURE — 1159F PR MEDICATION LIST DOCUMENTED IN MEDICAL RECORD: ICD-10-PCS | Mod: HCNC,CPTII,S$GLB, | Performed by: NURSE PRACTITIONER

## 2023-10-03 PROCEDURE — 3044F HG A1C LEVEL LT 7.0%: CPT | Mod: HCNC,CPTII,S$GLB, | Performed by: NURSE PRACTITIONER

## 2023-10-03 PROCEDURE — 3008F PR BODY MASS INDEX (BMI) DOCUMENTED: ICD-10-PCS | Mod: HCNC,CPTII,S$GLB, | Performed by: NURSE PRACTITIONER

## 2023-10-03 PROCEDURE — 1101F PR PT FALLS ASSESS DOC 0-1 FALLS W/OUT INJ PAST YR: ICD-10-PCS | Mod: HCNC,CPTII,S$GLB, | Performed by: NURSE PRACTITIONER

## 2023-10-03 PROCEDURE — 99999 PR PBB SHADOW E&M-EST. PATIENT-LVL III: CPT | Mod: PBBFAC,HCNC,, | Performed by: NURSE PRACTITIONER

## 2023-10-03 PROCEDURE — 99214 OFFICE O/P EST MOD 30 MIN: CPT | Mod: HCNC,S$GLB,, | Performed by: NURSE PRACTITIONER

## 2023-10-03 PROCEDURE — 1126F PR PAIN SEVERITY QUANTIFIED, NO PAIN PRESENT: ICD-10-PCS | Mod: HCNC,CPTII,S$GLB, | Performed by: NURSE PRACTITIONER

## 2023-10-03 PROCEDURE — 3008F BODY MASS INDEX DOCD: CPT | Mod: HCNC,CPTII,S$GLB, | Performed by: NURSE PRACTITIONER

## 2023-10-03 PROCEDURE — 3288F PR FALLS RISK ASSESSMENT DOCUMENTED: ICD-10-PCS | Mod: HCNC,CPTII,S$GLB, | Performed by: NURSE PRACTITIONER

## 2023-10-03 PROCEDURE — 1159F MED LIST DOCD IN RCRD: CPT | Mod: HCNC,CPTII,S$GLB, | Performed by: NURSE PRACTITIONER

## 2023-10-03 PROCEDURE — 99999 PR PBB SHADOW E&M-EST. PATIENT-LVL III: ICD-10-PCS | Mod: PBBFAC,HCNC,, | Performed by: NURSE PRACTITIONER

## 2023-10-03 PROCEDURE — 1101F PT FALLS ASSESS-DOCD LE1/YR: CPT | Mod: HCNC,CPTII,S$GLB, | Performed by: NURSE PRACTITIONER

## 2023-10-03 PROCEDURE — 99214 PR OFFICE/OUTPT VISIT, EST, LEVL IV, 30-39 MIN: ICD-10-PCS | Mod: HCNC,S$GLB,, | Performed by: NURSE PRACTITIONER

## 2023-10-03 NOTE — PROGRESS NOTES
GASTROENTEROLOGY CLINIC NOTE    Chief Complaint: The primary encounter diagnosis was Dyspepsia. A diagnosis of Eructation was also pertinent to this visit.  Referring provider/PCP: Maximus Shearer MD    Hilario Thomas is a 68 y.o. male who is a new patient to me with a PMH that's significant for colon polyps.  He is here today to establish care for flank pain, bloating, and belching.   These are new problems that began about three weeks ago. Originally sought care with PCP who initiated Pepcid.   Symptoms have since resolved.   No diet changes or new medications prior to start of symptoms but does report eating a lot of okra prior to start of symptoms.     Abdominal Pain  How Long: Three weeks ago accompanied by frequent belching and bloating.   Pain in morning after waking    Frequency: Daily in AM  Lasted about 15 minutes each morning and improved after belching   Location: Left Flank   Quality: stabbing   Onset: sudden   Radiate: no   Aggravated or Improved with bowel movement/eating/movement Belching improved pain  No change in pain with bowel movement or eating   Nausea/Vomiting/Unexplained Weight Loss: no   Pyrosis/Reflux/Dysphagia: no   Bowel Movements: No constipation or diarrhea but does state having one firm bowel movement per day instead of his normal two per day  Daily bowel movements  Passing less gas during episode   Melena/Hematochezia: no    Symptoms: no     Treatments: Pepcid   NSAIDs: no     GLP-1s: No  NSAIDs: No  Anticoagulation or Antiplatelet: No    History of H.pylori: no  H.pylori Treatment:  Prior Upper Endoscopy: no  Prior Colonoscopy: 5/2022 with Dr. River  Impression:            - Non-bleeding internal hemorrhoids.                          - Diverticulosis in the sigmoid colon.                          - Three 4 to 5 mm polyps in the rectum, in the                          sigmoid colon and at the hepatic flexure, removed                          with a cold snare. Resected  and retrieved.                          - The examination was otherwise normal on direct                          and retroflexion views.     Recommendation:        - Discharge patient to home.                          - Patient has a contact number available for                          emergencies. The signs and symptoms of potential                          delayed complications were discussed with the                          patient. Return to normal activities tomorrow.                          Written discharge instructions were provided to                          the patient.                          - Resume previous diet.                          - Continue present medications.                          - Await pathology results.                          - Repeat colonoscopy in 5 years for surveillance.    Pathology:  1. Hepatic flexure, polyp, polypectomy:       -  Early hyperplastic polyp   2. Sigmoid colon polyp, polypectomy:       -  Tubular adenoma, negative for high-grade dysplasia or carcinoma   3. Rectum, polyp, polypectomy:       -  Inflammatory polyp     Family h/o Colon Cancer: No  Family h/o Crohn's Disease or Ulcerative Colitis: No  Family h/o Celiac Sprue: No  Abdominal Surgeries: no      Review of Systems   Constitutional:  Negative for weight loss.   HENT:  Negative for sore throat.    Eyes:  Negative for blurred vision.   Respiratory:  Negative for cough.    Cardiovascular:  Negative for chest pain.   Gastrointestinal:  Negative for abdominal pain, blood in stool, constipation, diarrhea, heartburn, melena, nausea and vomiting.   Genitourinary:  Negative for dysuria.   Musculoskeletal:  Negative for myalgias.   Skin:  Negative for rash.   Neurological:  Negative for headaches.   Endo/Heme/Allergies:  Negative for environmental allergies.   Psychiatric/Behavioral:  Negative for suicidal ideas. The patient is not nervous/anxious.        Past Medical History: has a past medical history of  "Abnormal ECG, Arthritis, and Family history of ischemic heart disease.    Past Surgical History: has a past surgical history that includes Knee surgery; Knee Arthroplasty (Bilateral, 2020); Colonoscopy w/ polypectomy (05/25/2022); and Colonoscopy (N/A, 5/25/2022).    Family History:family history includes COPD in his father; Diabetes in his father and sister; Heart attack in his mother.    Allergies: Review of patient's allergies indicates:  No Known Allergies    Social History: reports that he quit smoking about 36 years ago. His smoking use included cigarettes. He started smoking about 46 years ago. He has a 10.0 pack-year smoking history. He has never used smokeless tobacco. He reports current alcohol use. He reports that he does not use drugs.    Home medications:   Current Outpatient Medications on File Prior to Visit   Medication Sig Dispense Refill    acetaminophen (TYLENOL) 650 MG TbSR Take 1 tablet (650 mg total) by mouth every 8 (eight) hours as needed. 120 tablet 0    ascorbic acid (VITAMIN C ORAL) Take by mouth. Hold for 1 week prior to surgery      famotidine (PEPCID) 40 MG tablet Take 1 tablet (40 mg total) by mouth every evening. for 14 days 14 tablet 0     No current facility-administered medications on file prior to visit.       Vital signs:  Ht 6' 2" (1.88 m)   Wt 103.7 kg (228 lb 9.9 oz)   BMI 29.35 kg/m²     Physical Exam  Vitals reviewed.   Constitutional:       Appearance: Normal appearance.   HENT:      Head: Normocephalic.   Pulmonary:      Effort: Pulmonary effort is normal. No respiratory distress.   Abdominal:      General: There is no distension.      Palpations: Abdomen is soft.      Tenderness: There is no abdominal tenderness.   Skin:     General: Skin is warm.   Neurological:      Mental Status: He is alert and oriented to person, place, and time.   Psychiatric:         Behavior: Behavior normal.         Thought Content: Thought content normal.         Routine labs:  Lab Results " "  Component Value Date    WBC 7.99 09/20/2023    HGB 16.2 09/20/2023    HCT 48.7 09/20/2023    MCV 95 09/20/2023     09/20/2023     Lab Results   Component Value Date    INR 0.9 07/27/2020     No results found for: "IRON", "FERRITIN", "TIBC", "FESATURATED"  Lab Results   Component Value Date     09/20/2023    K 4.8 09/20/2023     09/20/2023    CO2 27 09/20/2023    BUN 15 09/20/2023    CREATININE 0.9 09/20/2023     Lab Results   Component Value Date    ALBUMIN 4.3 09/20/2023    ALT 15 09/20/2023    AST 19 09/20/2023    ALKPHOS 69 09/20/2023    BILITOT 0.7 09/20/2023     No results found for: "GLUCOSE"  No results found for: "TSH"  Lab Results   Component Value Date    CALCIUM 9.8 09/20/2023       Imaging:      I have reviewed prior labs, imaging, and notes.      Assessment:  1. Dyspepsia    2. Eructation      Left flank pain lasted for about three weeks. Occurring daily in the morning after waking. Would last about 15 minutes and then improve with belching.   Accompanied by bloating. Taking Pepcid daily.       Plan:  Orders Placed This Encounter    X-Ray Abdomen AP 1 View    H. pylori Antibody, IgG     Abdominal xray to evaluate stool burden  Serum H.pylori  Continue Pepcid for a month then discontinue    Plan of care discussed with patient who is in agreement and verbalized understanding.     I have explained the planned procedures to the patient.The risks, benefits and alternatives of the procedure were also explained in detail. Patient verbalized understanding, all questions were answered. The patient agrees to proceed as planned    Follow Up: TANG Alvarado, APRN,FNP-BC  [facility]    (Portions of this note were dictated using voice recognition software and may contain dictation related errors in spelling/grammar/syntax not found on text review)    "

## 2023-10-18 ENCOUNTER — PATIENT MESSAGE (OUTPATIENT)
Dept: CARDIOLOGY | Facility: CLINIC | Age: 68
End: 2023-10-18
Payer: MEDICARE

## 2024-09-19 ENCOUNTER — PATIENT MESSAGE (OUTPATIENT)
Dept: PRIMARY CARE CLINIC | Facility: CLINIC | Age: 69
End: 2024-09-19
Payer: MEDICARE

## 2025-02-22 DIAGNOSIS — Z00.00 ENCOUNTER FOR MEDICARE ANNUAL WELLNESS EXAM: ICD-10-CM

## (undated) DEVICE — ADHESIVE DERMABOND ADVANCED

## (undated) DEVICE — DRAPE INCISE IOBAN 2 23X33IN

## (undated) DEVICE — Device

## (undated) DEVICE — KIT IRR SUCTION HND PIECE

## (undated) DEVICE — SYR 50CC LL

## (undated) DEVICE — SUT STRATAFIX 3-0 30CM

## (undated) DEVICE — KIT DRAPE RIO ONE PIECE W/POCK

## (undated) DEVICE — PAD COLD THERAPY KNEE WRAP ON

## (undated) DEVICE — SUT VICRYL+ 1 CT1 18IN

## (undated) DEVICE — SEE MEDLINE ITEM 157131

## (undated) DEVICE — SEE MEDLINE ITEM 146298

## (undated) DEVICE — PUMP COLD THERAPY

## (undated) DEVICE — KIT VIZADISC KNEE TRACKING

## (undated) DEVICE — KIT TOTAL KNEE TKOFG

## (undated) DEVICE — BANDAGE ACE ELASTIC 6"

## (undated) DEVICE — BOWL CEMENT

## (undated) DEVICE — DRAPE STERI INSTRUMENT 1018

## (undated) DEVICE — SET CEMENT (SCULP)

## (undated) DEVICE — SEE MEDLINE ITEM 152515

## (undated) DEVICE — ELECTRODE REM PLYHSV RETURN 9

## (undated) DEVICE — SUT VICRYL PLUS 2-0 CT1 18

## (undated) DEVICE — NDL 18GA X1 1/2 REG BEVEL

## (undated) DEVICE — SUT VICRYL PLUS 0 CT1 18IN

## (undated) DEVICE — HOOD T-5 TEAR AWAY STERILE

## (undated) DEVICE — PADDING CAST 6IN DELTA ROLL

## (undated) DEVICE — GUIDEWIRE EMERALD STR 260CM

## (undated) DEVICE — DRESSING AQUACEL AG FOAM 4X4

## (undated) DEVICE — UNDERGLOVES BIOGEL PI SIZE 8

## (undated) DEVICE — TAPE SURG DURAPORE 2 X10YD

## (undated) DEVICE — PAD CAST SPECIALIST STRL 6

## (undated) DEVICE — MASK FLYTE HOOD PEEL AWAY

## (undated) DEVICE — PADDING CAST 4IN DELTA ROLL

## (undated) DEVICE — GLOVE BIOGEL SKINSENSE PI 8.0

## (undated) DEVICE — SUT MONOCRYL 3-0 PS-1

## (undated) DEVICE — PIN BONE 4 X 140MM STERILE
Type: IMPLANTABLE DEVICE | Site: KNEE | Status: NON-FUNCTIONAL
Removed: 2020-08-04

## (undated) DEVICE — SEE MEDLINE ITEM 146271

## (undated) DEVICE — KIT CHECKPOINT MAKO

## (undated) DEVICE — KIT CHECKPOINT TIBIAL

## (undated) DEVICE — DRESSING AQUACEL AG ADV 3.5X12

## (undated) DEVICE — TOURNIQUET SB QC DP 34X4IN

## (undated) DEVICE — BLADE RECIP RIBBED

## (undated) DEVICE — BLADE SAGITTAL 18 X 1.27 X 90M

## (undated) DEVICE — GLOVE BIOGEL SKINSENSE PI 7.5